# Patient Record
Sex: FEMALE | Race: WHITE | Employment: OTHER | URBAN - NONMETROPOLITAN AREA
[De-identification: names, ages, dates, MRNs, and addresses within clinical notes are randomized per-mention and may not be internally consistent; named-entity substitution may affect disease eponyms.]

---

## 2017-08-24 ENCOUNTER — OFFICE VISIT (OUTPATIENT)
Dept: PRIMARY CARE CLINIC | Age: 57
End: 2017-08-24
Payer: MEDICARE

## 2017-08-24 VITALS
WEIGHT: 109 LBS | OXYGEN SATURATION: 97 % | HEART RATE: 71 BPM | BODY MASS INDEX: 17.11 KG/M2 | HEIGHT: 67 IN | DIASTOLIC BLOOD PRESSURE: 76 MMHG | TEMPERATURE: 97.1 F | SYSTOLIC BLOOD PRESSURE: 110 MMHG

## 2017-08-24 DIAGNOSIS — Z79.899 MEDICATION MANAGEMENT: ICD-10-CM

## 2017-08-24 DIAGNOSIS — G43.909 MIGRAINE WITHOUT STATUS MIGRAINOSUS, NOT INTRACTABLE, UNSPECIFIED MIGRAINE TYPE: Primary | ICD-10-CM

## 2017-08-24 DIAGNOSIS — G89.29 CHRONIC BILATERAL LOW BACK PAIN, WITH SCIATICA PRESENCE UNSPECIFIED: ICD-10-CM

## 2017-08-24 DIAGNOSIS — F41.9 ANXIETY: ICD-10-CM

## 2017-08-24 DIAGNOSIS — M54.5 CHRONIC BILATERAL LOW BACK PAIN, WITH SCIATICA PRESENCE UNSPECIFIED: ICD-10-CM

## 2017-08-24 DIAGNOSIS — G47.00 INSOMNIA, UNSPECIFIED TYPE: ICD-10-CM

## 2017-08-24 DIAGNOSIS — J44.9 CHRONIC OBSTRUCTIVE PULMONARY DISEASE, UNSPECIFIED COPD TYPE (HCC): ICD-10-CM

## 2017-08-24 DIAGNOSIS — E03.9 HYPOTHYROIDISM, UNSPECIFIED TYPE: ICD-10-CM

## 2017-08-24 DIAGNOSIS — K59.09 CHRONIC CONSTIPATION: ICD-10-CM

## 2017-08-24 DIAGNOSIS — F32.A DEPRESSION, UNSPECIFIED DEPRESSION TYPE: ICD-10-CM

## 2017-08-24 PROBLEM — M54.50 CHRONIC BILATERAL LOW BACK PAIN: Status: ACTIVE | Noted: 2017-08-24

## 2017-08-24 LAB
AMPHETAMINE SCREEN, URINE: NORMAL
BARBITURATE SCREEN, URINE: NORMAL
BENZODIAZEPINE SCREEN, URINE: NORMAL
COCAINE METABOLITE SCREEN URINE: NORMAL
MDMA URINE: NORMAL
METHADONE SCREEN, URINE: NORMAL
METHAMPHETAMINE, URINE: NORMAL
OPIATE SCREEN URINE: NORMAL
OXYCODONE SCREEN URINE: NORMAL
PHENCYCLIDINE SCREEN URINE: NORMAL
PROPOXYPHENE SCREEN, URINE: NORMAL
THC: NORMAL
TRICYCLIC ANTIDEPRESSANTS, UR: NORMAL

## 2017-08-24 PROCEDURE — 3017F COLORECTAL CA SCREEN DOC REV: CPT | Performed by: NURSE PRACTITIONER

## 2017-08-24 PROCEDURE — G8419 CALC BMI OUT NRM PARAM NOF/U: HCPCS | Performed by: NURSE PRACTITIONER

## 2017-08-24 PROCEDURE — 4004F PT TOBACCO SCREEN RCVD TLK: CPT | Performed by: NURSE PRACTITIONER

## 2017-08-24 PROCEDURE — 3023F SPIROM DOC REV: CPT | Performed by: NURSE PRACTITIONER

## 2017-08-24 PROCEDURE — G8427 DOCREV CUR MEDS BY ELIG CLIN: HCPCS | Performed by: NURSE PRACTITIONER

## 2017-08-24 PROCEDURE — 80305 DRUG TEST PRSMV DIR OPT OBS: CPT | Performed by: NURSE PRACTITIONER

## 2017-08-24 PROCEDURE — 99204 OFFICE O/P NEW MOD 45 MIN: CPT | Performed by: NURSE PRACTITIONER

## 2017-08-24 PROCEDURE — G8926 SPIRO NO PERF OR DOC: HCPCS | Performed by: NURSE PRACTITIONER

## 2017-08-24 PROCEDURE — 3014F SCREEN MAMMO DOC REV: CPT | Performed by: NURSE PRACTITIONER

## 2017-08-24 RX ORDER — BUPROPION HYDROCHLORIDE 150 MG/1
150 TABLET ORAL EVERY MORNING
COMMUNITY
End: 2017-09-11 | Stop reason: SDUPTHER

## 2017-08-24 RX ORDER — PROMETHAZINE HCL 50 MG
50 TABLET ORAL EVERY 6 HOURS PRN
COMMUNITY
End: 2017-12-28

## 2017-08-24 RX ORDER — PROMETHAZINE HYDROCHLORIDE 25 MG/1
25 TABLET ORAL EVERY 6 HOURS PRN
COMMUNITY
End: 2017-12-28 | Stop reason: SDUPTHER

## 2017-08-24 RX ORDER — ALBUTEROL SULFATE 90 UG/1
2 AEROSOL, METERED RESPIRATORY (INHALATION) EVERY 6 HOURS PRN
COMMUNITY
End: 2017-09-11 | Stop reason: SDUPTHER

## 2017-08-24 RX ORDER — BUTALBITAL, ACETAMINOPHEN, CAFFEINE AND CODEINE PHOSPHATE 300; 50; 40; 30 MG/1; MG/1; MG/1; MG/1
1 CAPSULE ORAL EVERY 4 HOURS PRN
COMMUNITY
End: 2017-08-24

## 2017-08-24 RX ORDER — FLUOXETINE HYDROCHLORIDE 40 MG/1
40 CAPSULE ORAL DAILY
COMMUNITY
End: 2017-09-11 | Stop reason: SDUPTHER

## 2017-08-24 RX ORDER — TRAZODONE HYDROCHLORIDE 100 MG/1
100 TABLET ORAL NIGHTLY
COMMUNITY
End: 2017-09-11 | Stop reason: SDUPTHER

## 2017-08-24 RX ORDER — LEVOTHYROXINE SODIUM 0.05 MG/1
50 TABLET ORAL DAILY
COMMUNITY
End: 2017-09-11 | Stop reason: SDUPTHER

## 2017-08-24 RX ORDER — PANTOPRAZOLE SODIUM 40 MG/1
40 TABLET, DELAYED RELEASE ORAL 2 TIMES DAILY
COMMUNITY
End: 2017-09-11 | Stop reason: SDUPTHER

## 2017-08-24 RX ORDER — DIAZEPAM 10 MG/1
10 TABLET ORAL EVERY 12 HOURS PRN
Qty: 60 TABLET | Refills: 0 | Status: SHIPPED | OUTPATIENT
Start: 2017-08-24 | End: 2017-09-21

## 2017-08-24 RX ORDER — ONDANSETRON 8 MG/1
8 TABLET, ORALLY DISINTEGRATING ORAL EVERY 8 HOURS PRN
COMMUNITY
End: 2017-09-21

## 2017-08-24 RX ORDER — DIAZEPAM 10 MG/1
10 TABLET ORAL EVERY 12 HOURS PRN
COMMUNITY
End: 2017-08-24 | Stop reason: SDUPTHER

## 2017-08-24 RX ORDER — MONTELUKAST SODIUM 10 MG/1
10 TABLET ORAL NIGHTLY
COMMUNITY
End: 2017-09-11 | Stop reason: SDUPTHER

## 2017-08-24 RX ORDER — HYDROCODONE BITARTRATE AND ACETAMINOPHEN 7.5; 325 MG/1; MG/1
1 TABLET ORAL EVERY 8 HOURS PRN
Qty: 90 TABLET | Refills: 0 | Status: SHIPPED | OUTPATIENT
Start: 2017-08-24 | End: 2017-09-21

## 2017-08-24 RX ORDER — BENZONATATE 100 MG/1
100 CAPSULE ORAL 3 TIMES DAILY PRN
COMMUNITY
End: 2017-11-22

## 2017-08-24 RX ORDER — CYCLOSPORINE 0.5 MG/ML
1 EMULSION OPHTHALMIC 2 TIMES DAILY
COMMUNITY
End: 2018-09-06 | Stop reason: SDUPTHER

## 2017-08-24 ASSESSMENT — ENCOUNTER SYMPTOMS
TROUBLE SWALLOWING: 0
DIARRHEA: 0
ABDOMINAL PAIN: 0
CONSTIPATION: 0
NAUSEA: 1
EYE REDNESS: 0
BLOOD IN STOOL: 0
BACK PAIN: 1
COUGH: 0
EYE DISCHARGE: 0
RHINORRHEA: 0
SORE THROAT: 0
WHEEZING: 0

## 2017-09-06 ENCOUNTER — TELEPHONE (OUTPATIENT)
Dept: PRIMARY CARE CLINIC | Age: 57
End: 2017-09-06

## 2017-09-06 DIAGNOSIS — G89.29 CHRONIC BILATERAL LOW BACK PAIN, WITH SCIATICA PRESENCE UNSPECIFIED: Primary | ICD-10-CM

## 2017-09-06 DIAGNOSIS — M54.5 CHRONIC BILATERAL LOW BACK PAIN, WITH SCIATICA PRESENCE UNSPECIFIED: Primary | ICD-10-CM

## 2017-09-07 ENCOUNTER — TELEPHONE (OUTPATIENT)
Dept: PRIMARY CARE CLINIC | Age: 57
End: 2017-09-07

## 2017-09-11 DIAGNOSIS — K59.09 CHRONIC CONSTIPATION: ICD-10-CM

## 2017-09-11 DIAGNOSIS — G47.00 INSOMNIA, UNSPECIFIED TYPE: ICD-10-CM

## 2017-09-11 DIAGNOSIS — F32.A DEPRESSION, UNSPECIFIED DEPRESSION TYPE: ICD-10-CM

## 2017-09-11 DIAGNOSIS — F41.9 ANXIETY: ICD-10-CM

## 2017-09-11 DIAGNOSIS — E03.9 HYPOTHYROIDISM, UNSPECIFIED TYPE: ICD-10-CM

## 2017-09-11 DIAGNOSIS — J44.9 CHRONIC OBSTRUCTIVE PULMONARY DISEASE, UNSPECIFIED COPD TYPE (HCC): ICD-10-CM

## 2017-09-11 RX ORDER — MONTELUKAST SODIUM 10 MG/1
10 TABLET ORAL NIGHTLY
Qty: 90 TABLET | Refills: 1 | Status: SHIPPED | OUTPATIENT
Start: 2017-09-11 | End: 2017-12-28 | Stop reason: SDUPTHER

## 2017-09-11 RX ORDER — ALBUTEROL SULFATE 90 UG/1
2 AEROSOL, METERED RESPIRATORY (INHALATION) EVERY 6 HOURS PRN
Qty: 3 INHALER | Refills: 1 | Status: SHIPPED | OUTPATIENT
Start: 2017-09-11 | End: 2018-03-01 | Stop reason: SDUPTHER

## 2017-09-11 RX ORDER — FLUOXETINE HYDROCHLORIDE 40 MG/1
40 CAPSULE ORAL DAILY
Qty: 90 CAPSULE | Refills: 1 | Status: SHIPPED | OUTPATIENT
Start: 2017-09-11 | End: 2017-11-07 | Stop reason: SDUPTHER

## 2017-09-11 RX ORDER — PANTOPRAZOLE SODIUM 40 MG/1
40 TABLET, DELAYED RELEASE ORAL 2 TIMES DAILY
Qty: 180 TABLET | Refills: 1 | Status: SHIPPED | OUTPATIENT
Start: 2017-09-11 | End: 2018-03-01 | Stop reason: SDUPTHER

## 2017-09-11 RX ORDER — BUPROPION HYDROCHLORIDE 150 MG/1
150 TABLET ORAL EVERY MORNING
Qty: 90 TABLET | Refills: 1 | Status: SHIPPED | OUTPATIENT
Start: 2017-09-11 | End: 2017-10-31 | Stop reason: SDUPTHER

## 2017-09-11 RX ORDER — LEVOTHYROXINE SODIUM 0.05 MG/1
50 TABLET ORAL DAILY
Qty: 90 TABLET | Refills: 1 | Status: SHIPPED | OUTPATIENT
Start: 2017-09-11 | End: 2018-03-01 | Stop reason: SDUPTHER

## 2017-09-11 RX ORDER — TRAZODONE HYDROCHLORIDE 100 MG/1
100 TABLET ORAL NIGHTLY
Qty: 90 TABLET | Refills: 1 | Status: SHIPPED | OUTPATIENT
Start: 2017-09-11 | End: 2017-09-11 | Stop reason: SDUPTHER

## 2017-09-12 ENCOUNTER — TELEPHONE (OUTPATIENT)
Dept: PRIMARY CARE CLINIC | Age: 57
End: 2017-09-12

## 2017-09-12 RX ORDER — TRAZODONE HYDROCHLORIDE 100 MG/1
TABLET ORAL
Qty: 180 TABLET | Refills: 1 | Status: SHIPPED | OUTPATIENT
Start: 2017-09-12 | End: 2017-11-22 | Stop reason: SDUPTHER

## 2017-09-13 ENCOUNTER — TELEPHONE (OUTPATIENT)
Dept: PRIMARY CARE CLINIC | Age: 57
End: 2017-09-13

## 2017-09-20 ENCOUNTER — HOSPITAL ENCOUNTER (OUTPATIENT)
Dept: CT IMAGING | Age: 57
Discharge: HOME | End: 2017-09-20
Payer: MEDICARE

## 2017-09-20 DIAGNOSIS — M54.5 CHRONIC BILATERAL LOW BACK PAIN, WITH SCIATICA PRESENCE UNSPECIFIED: ICD-10-CM

## 2017-09-20 DIAGNOSIS — G89.29 CHRONIC BILATERAL LOW BACK PAIN, WITH SCIATICA PRESENCE UNSPECIFIED: ICD-10-CM

## 2017-09-20 PROCEDURE — 72131 CT LUMBAR SPINE W/O DYE: CPT

## 2017-09-21 ENCOUNTER — TELEPHONE (OUTPATIENT)
Dept: PRIMARY CARE CLINIC | Age: 57
End: 2017-09-21

## 2017-09-21 ENCOUNTER — OFFICE VISIT (OUTPATIENT)
Dept: PRIMARY CARE CLINIC | Age: 57
End: 2017-09-21
Payer: MEDICARE

## 2017-09-21 VITALS
SYSTOLIC BLOOD PRESSURE: 96 MMHG | HEIGHT: 67 IN | OXYGEN SATURATION: 98 % | TEMPERATURE: 98.7 F | HEART RATE: 87 BPM | DIASTOLIC BLOOD PRESSURE: 74 MMHG | WEIGHT: 107.5 LBS | BODY MASS INDEX: 16.87 KG/M2

## 2017-09-21 DIAGNOSIS — R21 RASH: ICD-10-CM

## 2017-09-21 DIAGNOSIS — R56.9 SEIZURES (HCC): ICD-10-CM

## 2017-09-21 DIAGNOSIS — F32.A DEPRESSION, UNSPECIFIED DEPRESSION TYPE: ICD-10-CM

## 2017-09-21 DIAGNOSIS — Z12.31 SCREENING MAMMOGRAM, ENCOUNTER FOR: ICD-10-CM

## 2017-09-21 DIAGNOSIS — K59.09 CHRONIC CONSTIPATION: ICD-10-CM

## 2017-09-21 DIAGNOSIS — F41.1 GAD (GENERALIZED ANXIETY DISORDER): ICD-10-CM

## 2017-09-21 DIAGNOSIS — Z11.59 NEED FOR HEPATITIS C SCREENING TEST: ICD-10-CM

## 2017-09-21 DIAGNOSIS — M54.5 CHRONIC BILATERAL LOW BACK PAIN, WITH SCIATICA PRESENCE UNSPECIFIED: Primary | ICD-10-CM

## 2017-09-21 DIAGNOSIS — N89.8 VAGINAL DISCHARGE: ICD-10-CM

## 2017-09-21 DIAGNOSIS — M54.16 LUMBAR NERVE ROOT IMPINGEMENT: ICD-10-CM

## 2017-09-21 DIAGNOSIS — Z79.899 MEDICATION MANAGEMENT: ICD-10-CM

## 2017-09-21 DIAGNOSIS — G89.29 CHRONIC BILATERAL LOW BACK PAIN, WITH SCIATICA PRESENCE UNSPECIFIED: Primary | ICD-10-CM

## 2017-09-21 DIAGNOSIS — G43.909 MIGRAINE WITHOUT STATUS MIGRAINOSUS, NOT INTRACTABLE, UNSPECIFIED MIGRAINE TYPE: ICD-10-CM

## 2017-09-21 DIAGNOSIS — E03.9 HYPOTHYROIDISM, UNSPECIFIED TYPE: ICD-10-CM

## 2017-09-21 DIAGNOSIS — Z80.0 FAMILY HISTORY OF COLON CANCER: ICD-10-CM

## 2017-09-21 DIAGNOSIS — Z11.4 SCREENING FOR HIV (HUMAN IMMUNODEFICIENCY VIRUS): ICD-10-CM

## 2017-09-21 DIAGNOSIS — K31.84 GASTROPARESIS: ICD-10-CM

## 2017-09-21 LAB
AMPHETAMINE SCREEN, URINE: NEGATIVE
BARBITURATE SCREEN, URINE: NEGATIVE
BENZODIAZEPINE SCREEN, URINE: POSITIVE
COCAINE METABOLITE SCREEN URINE: NEGATIVE
MDMA URINE: NEGATIVE
METHADONE SCREEN, URINE: NEGATIVE
METHAMPHETAMINE, URINE: NEGATIVE
OPIATE SCREEN URINE: POSITIVE
OXYCODONE SCREEN URINE: NEGATIVE
PHENCYCLIDINE SCREEN URINE: NEGATIVE
PROPOXYPHENE SCREEN, URINE: NEGATIVE
THC: NEGATIVE
TRICYCLIC ANTIDEPRESSANTS, UR: NEGATIVE

## 2017-09-21 PROCEDURE — 99214 OFFICE O/P EST MOD 30 MIN: CPT | Performed by: NURSE PRACTITIONER

## 2017-09-21 PROCEDURE — 4004F PT TOBACCO SCREEN RCVD TLK: CPT | Performed by: NURSE PRACTITIONER

## 2017-09-21 PROCEDURE — G8419 CALC BMI OUT NRM PARAM NOF/U: HCPCS | Performed by: NURSE PRACTITIONER

## 2017-09-21 PROCEDURE — 3017F COLORECTAL CA SCREEN DOC REV: CPT | Performed by: NURSE PRACTITIONER

## 2017-09-21 PROCEDURE — 3014F SCREEN MAMMO DOC REV: CPT | Performed by: NURSE PRACTITIONER

## 2017-09-21 PROCEDURE — G8427 DOCREV CUR MEDS BY ELIG CLIN: HCPCS | Performed by: NURSE PRACTITIONER

## 2017-09-21 PROCEDURE — 80305 DRUG TEST PRSMV DIR OPT OBS: CPT | Performed by: NURSE PRACTITIONER

## 2017-09-21 RX ORDER — CLOBETASOL PROPIONATE 0.5 MG/G
CREAM TOPICAL 2 TIMES DAILY
Qty: 60 G | Refills: 1 | Status: SHIPPED | OUTPATIENT
Start: 2017-09-21 | End: 2018-02-12

## 2017-09-21 RX ORDER — BUTALBITAL, ACETAMINOPHEN AND CAFFEINE 50; 325; 40 MG/1; MG/1; MG/1
1 TABLET ORAL EVERY 8 HOURS PRN
Qty: 30 TABLET | Refills: 0 | Status: SHIPPED | OUTPATIENT
Start: 2017-09-21 | End: 2017-12-28

## 2017-09-21 RX ORDER — LORAZEPAM 1 MG/1
1 TABLET ORAL EVERY 8 HOURS PRN
Qty: 90 TABLET | Refills: 0 | Status: SHIPPED | OUTPATIENT
Start: 2017-09-21 | End: 2017-10-23

## 2017-09-21 RX ORDER — CLOBETASOL PROPIONATE 0.5 MG/G
CREAM TOPICAL 2 TIMES DAILY
COMMUNITY
End: 2017-09-21 | Stop reason: SDUPTHER

## 2017-09-21 RX ORDER — HYDROCODONE BITARTRATE AND ACETAMINOPHEN 10; 325 MG/1; MG/1
1 TABLET ORAL EVERY 8 HOURS PRN
Qty: 25 TABLET | Refills: 0 | Status: SHIPPED | OUTPATIENT
Start: 2017-09-21 | End: 2017-09-29 | Stop reason: SDUPTHER

## 2017-09-21 RX ORDER — SUMATRIPTAN 50 MG/1
50 TABLET, FILM COATED ORAL
Qty: 9 TABLET | Refills: 3 | Status: SHIPPED | OUTPATIENT
Start: 2017-09-21 | End: 2017-10-23

## 2017-09-21 ASSESSMENT — ENCOUNTER SYMPTOMS
BLOOD IN STOOL: 0
ABDOMINAL PAIN: 0
WHEEZING: 0
NAUSEA: 1
SORE THROAT: 0
TROUBLE SWALLOWING: 0
COUGH: 0
CONSTIPATION: 0
EYE DISCHARGE: 0
DIARRHEA: 0
RHINORRHEA: 0
BACK PAIN: 1
EYE REDNESS: 0

## 2017-09-21 NOTE — TELEPHONE ENCOUNTER
----- Message from PARVEEN Pettit sent at 9/20/2017  7:02 PM CDT -----  Please notify patient of the following  CT scan of lumbar spine:  No evidence of acute bony injury. There is multilevel degenerative disc disease. There is severe left neuroforaminal narrowing at L4 and 5 and L5-S1. There is mass effect upon the exiting nerve roots. There is also mild spinal canal stenosis at L4-L5. Recommend referral to neurosurgeon of choice.   Keep referral to pain mgt as well

## 2017-09-28 ENCOUNTER — TELEPHONE (OUTPATIENT)
Dept: PRIMARY CARE CLINIC | Age: 57
End: 2017-09-28

## 2017-09-29 DIAGNOSIS — G89.29 CHRONIC BILATERAL LOW BACK PAIN, WITH SCIATICA PRESENCE UNSPECIFIED: ICD-10-CM

## 2017-09-29 DIAGNOSIS — M54.16 LUMBAR NERVE ROOT IMPINGEMENT: ICD-10-CM

## 2017-09-29 DIAGNOSIS — M54.5 CHRONIC BILATERAL LOW BACK PAIN, WITH SCIATICA PRESENCE UNSPECIFIED: ICD-10-CM

## 2017-09-29 RX ORDER — HYDROCODONE BITARTRATE AND ACETAMINOPHEN 10; 325 MG/1; MG/1
1 TABLET ORAL EVERY 8 HOURS PRN
Qty: 12 TABLET | Refills: 0 | Status: SHIPPED | OUTPATIENT
Start: 2017-09-29 | End: 2017-10-03

## 2017-10-02 ENCOUNTER — TELEPHONE (OUTPATIENT)
Dept: PRIMARY CARE CLINIC | Age: 57
End: 2017-10-02

## 2017-10-02 RX ORDER — LORATADINE 10 MG/1
10 TABLET ORAL DAILY
Qty: 30 TABLET | Refills: 3 | Status: SHIPPED | OUTPATIENT
Start: 2017-10-02 | End: 2018-02-12

## 2017-10-02 RX ORDER — RIZATRIPTAN BENZOATE 5 MG/1
5 TABLET ORAL
Qty: 30 TABLET | Refills: 3 | Status: SHIPPED | OUTPATIENT
Start: 2017-10-02 | End: 2017-11-22 | Stop reason: SDUPTHER

## 2017-10-02 NOTE — TELEPHONE ENCOUNTER
Pt states has migraines ALL day, from the time she wakes up til she goes to bed. States already out of Imitrex and butalbital didn't help at all. Wants diff med please. And something for hay fever.

## 2017-10-03 ENCOUNTER — HOSPITAL ENCOUNTER (EMERGENCY)
Age: 57
Discharge: HOME | End: 2017-10-03
Attending: EMERGENCY MEDICINE
Payer: MEDICARE

## 2017-10-03 ENCOUNTER — APPOINTMENT (OUTPATIENT)
Dept: CT IMAGING | Age: 57
End: 2017-10-03
Payer: MEDICARE

## 2017-10-03 VITALS
OXYGEN SATURATION: 98 % | WEIGHT: 107 LBS | RESPIRATION RATE: 16 BRPM | HEIGHT: 67 IN | BODY MASS INDEX: 16.79 KG/M2 | TEMPERATURE: 97.6 F | SYSTOLIC BLOOD PRESSURE: 127 MMHG | DIASTOLIC BLOOD PRESSURE: 82 MMHG | HEART RATE: 71 BPM

## 2017-10-03 DIAGNOSIS — R56.9 SEIZURE (HCC): ICD-10-CM

## 2017-10-03 DIAGNOSIS — G43.909 MIGRAINE WITHOUT STATUS MIGRAINOSUS, NOT INTRACTABLE, UNSPECIFIED MIGRAINE TYPE: Primary | ICD-10-CM

## 2017-10-03 LAB
ALBUMIN SERPL-MCNC: 3.7 G/DL (ref 3.5–5.2)
ALP BLD-CCNC: 50 U/L (ref 35–104)
ALT SERPL-CCNC: 13 U/L (ref 5–33)
ANION GAP SERPL CALCULATED.3IONS-SCNC: 12 MMOL/L (ref 7–19)
AST SERPL-CCNC: 15 U/L (ref 5–32)
BILIRUB SERPL-MCNC: <0.2 MG/DL (ref 0.2–1.2)
BILIRUBIN URINE: NEGATIVE
BLOOD, URINE: NEGATIVE
BUN BLDV-MCNC: 8 MG/DL (ref 6–20)
CALCIUM SERPL-MCNC: 8.2 MG/DL (ref 8.6–10)
CHLORIDE BLD-SCNC: 100 MMOL/L (ref 98–111)
CLARITY: CLEAR
CO2: 21 MMOL/L (ref 22–29)
COLOR: YELLOW
CREAT SERPL-MCNC: 0.7 MG/DL (ref 0.5–0.9)
GFR NON-AFRICAN AMERICAN: >60
GLUCOSE BLD-MCNC: 98 MG/DL (ref 74–109)
GLUCOSE URINE: NEGATIVE MG/DL
HCT VFR BLD CALC: 28.9 % (ref 37–47)
HEMOGLOBIN: 9.9 G/DL (ref 12–16)
KETONES, URINE: NEGATIVE MG/DL
LEUKOCYTE ESTERASE, URINE: NEGATIVE
MCH RBC QN AUTO: 33.4 PG (ref 27–31)
MCHC RBC AUTO-ENTMCNC: 34.3 G/DL (ref 33–37)
MCV RBC AUTO: 97.6 FL (ref 81–99)
NITRITE, URINE: NEGATIVE
PDW BLD-RTO: 12 % (ref 11.5–14.5)
PH UA: 5.5
PLATELET # BLD: 256 K/UL (ref 130–400)
PMV BLD AUTO: 9 FL (ref 9.4–12.3)
POTASSIUM SERPL-SCNC: 3.4 MMOL/L (ref 3.5–5)
PROTEIN UA: NEGATIVE MG/DL
RBC # BLD: 2.96 M/UL (ref 4.2–5.4)
SODIUM BLD-SCNC: 133 MMOL/L (ref 136–145)
SPECIFIC GRAVITY UA: 1
TOTAL PROTEIN: 5.8 G/DL (ref 6.6–8.7)
UROBILINOGEN, URINE: 0.2 E.U./DL
WBC # BLD: 3.2 K/UL (ref 4.8–10.8)

## 2017-10-03 PROCEDURE — 6360000002 HC RX W HCPCS: Performed by: NURSE PRACTITIONER

## 2017-10-03 PROCEDURE — 2580000003 HC RX 258: Performed by: NURSE PRACTITIONER

## 2017-10-03 PROCEDURE — 96374 THER/PROPH/DIAG INJ IV PUSH: CPT

## 2017-10-03 PROCEDURE — 96375 TX/PRO/DX INJ NEW DRUG ADDON: CPT

## 2017-10-03 PROCEDURE — 81003 URINALYSIS AUTO W/O SCOPE: CPT

## 2017-10-03 PROCEDURE — 99284 EMERGENCY DEPT VISIT MOD MDM: CPT

## 2017-10-03 PROCEDURE — 80053 COMPREHEN METABOLIC PANEL: CPT

## 2017-10-03 PROCEDURE — 70450 CT HEAD/BRAIN W/O DYE: CPT

## 2017-10-03 PROCEDURE — 36415 COLL VENOUS BLD VENIPUNCTURE: CPT

## 2017-10-03 PROCEDURE — 99283 EMERGENCY DEPT VISIT LOW MDM: CPT | Performed by: EMERGENCY MEDICINE

## 2017-10-03 PROCEDURE — 85027 COMPLETE CBC AUTOMATED: CPT

## 2017-10-03 RX ORDER — 0.9 % SODIUM CHLORIDE 0.9 %
1000 INTRAVENOUS SOLUTION INTRAVENOUS ONCE
Status: COMPLETED | OUTPATIENT
Start: 2017-10-03 | End: 2017-10-03

## 2017-10-03 RX ORDER — PROMETHAZINE HYDROCHLORIDE 25 MG/ML
12.5 INJECTION, SOLUTION INTRAMUSCULAR; INTRAVENOUS ONCE
Status: COMPLETED | OUTPATIENT
Start: 2017-10-03 | End: 2017-10-03

## 2017-10-03 RX ADMIN — SODIUM CHLORIDE 1000 ML: 9 INJECTION, SOLUTION INTRAVENOUS at 07:45

## 2017-10-03 RX ADMIN — PROMETHAZINE HYDROCHLORIDE 12.5 MG: 25 INJECTION INTRAMUSCULAR; INTRAVENOUS at 07:44

## 2017-10-03 RX ADMIN — HYDROMORPHONE HYDROCHLORIDE 1 MG: 1 INJECTION, SOLUTION INTRAMUSCULAR; INTRAVENOUS; SUBCUTANEOUS at 07:45

## 2017-10-03 ASSESSMENT — PAIN SCALES - GENERAL
PAINLEVEL_OUTOF10: 10
PAINLEVEL_OUTOF10: 9

## 2017-10-03 ASSESSMENT — PAIN DESCRIPTION - PAIN TYPE: TYPE: ACUTE PAIN

## 2017-10-03 ASSESSMENT — ENCOUNTER SYMPTOMS
RESPIRATORY NEGATIVE: 1
BACK PAIN: 1
NAUSEA: 1
VOMITING: 1

## 2017-10-03 ASSESSMENT — PAIN DESCRIPTION - LOCATION
LOCATION: BACK
LOCATION: HEAD;BACK

## 2017-10-03 NOTE — ED PROVIDER NOTES
neuro as planned, pt voiced agreement with plan      FINAL IMPRESSION      1. Migraine without status migrainosus, not intractable, unspecified migraine type    2.  Seizure ([facility].)          Mayi Bolivar MD  Attending Emergency Physician       Loi Archer MD  10/03/17 [id number]

## 2017-10-03 NOTE — ED PROVIDER NOTES
performed and is negative except as noted above in the HPI. PAST MEDICAL HISTORY     Past Medical History:   Diagnosis Date    Allergic rhinitis     Anxiety     Asthma     B12 deficiency     Depression     Gastroparesis     Headache     Hypothyroid     Seizures (HCC)          SURGICAL HISTORY       Past Surgical History:   Procedure Laterality Date    CHOLECYSTECTOMY      COLON SURGERY      GASTRIC STIMULATOR IMPLANT SURGERY      HERNIA REPAIR      TUBAL LIGATION           CURRENT MEDICATIONS       Discharge Medication List as of 10/3/2017  9:27 AM      CONTINUE these medications which have NOT CHANGED    Details   loratadine (CLARITIN) 10 MG tablet Take 1 tablet by mouth daily, Disp-30 tablet, R-3Normal      rizatriptan (MAXALT) 5 MG tablet Take 1 tablet by mouth once as needed for Migraine May repeat in 2 hours if needed, Disp-30 tablet, R-3Normal      HYDROcodone-acetaminophen (NORCO)  MG per tablet Take 1 tablet by mouth every 8 hours as needed for Pain . Earliest Fill Date: 9/29/17, Disp-12 tablet, R-0Print      clobetasol (TEMOVATE) 0.05 % cream Apply topically 2 times daily Apply topically 2 times daily. , Topical, 2 TIMES DAILY Starting 9/21/2017, Until Discontinued, Disp-60 g, R-1, Normal      butalbital-acetaminophen-caffeine (FIORICET, ESGIC) -40 MG per tablet Take 1 tablet by mouth every 8 hours as needed for Migraine, Disp-30 tablet, R-0Print      LORazepam (ATIVAN) 1 MG tablet Take 1 tablet by mouth every 8 hours as needed for Anxiety, Disp-90 tablet, R-0Print      topiramate (TOPAMAX) 200 MG tablet Indications: TAKE 2 TABLETS BY MOUTH AT BEDTIME Take 2 tablets by mouth at bedtime, Disp-180 tablet, R-1Normal      traZODone (DESYREL) 100 MG tablet Indications: TAKE 2 TABLETS BY MOUTH NIGHTLY AT BEDTIME Take 2 tablets by mouth at bedtime, Disp-180 tablet, R-1Normal      buPROPion (WELLBUTRIN XL) 150 MG extended release tablet Take 1 tablet by mouth every morning, Disp-90 tablet, R-1Normal      FLUoxetine (PROZAC) 40 MG capsule Take 1 capsule by mouth daily, Disp-90 capsule, R-1Normal      levothyroxine (SYNTHROID) 50 MCG tablet Take 1 tablet by mouth Daily, Disp-90 tablet, R-1Normal      linaclotide (LINZESS) 290 MCG CAPS capsule Take 1 capsule by mouth every morning (before breakfast), Disp-90 capsule, R-1Normal      montelukast (SINGULAIR) 10 MG tablet Take 1 tablet by mouth nightly, Disp-90 tablet, R-1Normal      pantoprazole (PROTONIX) 40 MG tablet Take 1 tablet by mouth 2 times daily, Disp-180 tablet, R-1Normal      tiotropium (SPIRIVA) 18 MCG inhalation capsule Inhale 1 capsule into the lungs daily, Disp-90 capsule, R-1Normal      albuterol sulfate  (90 Base) MCG/ACT inhaler Inhale 2 puffs into the lungs every 6 hours as needed for Wheezing, Disp-3 Inhaler, R-1Normal      cycloSPORINE (RESTASIS) 0.05 % ophthalmic emulsion Place 1 drop into both eyes 2 times dailyHistorical Med      benzonatate (TESSALON) 100 MG capsule Take 100 mg by mouth 3 times daily as needed for CoughHistorical Med      !! promethazine (PHENERGAN) 25 MG tablet Take 25 mg by mouth every 6 hours as needed for NauseaHistorical Med      !! promethazine (PHENERGAN) 50 MG tablet Take 50 mg by mouth every 6 hours as needed for NauseaHistorical Med      SUMAtriptan (IMITREX) 50 MG tablet Take 1 tablet by mouth once as needed for Migraine X 1, Disp-9 tablet, R-3Normal       !! - Potential duplicate medications found. Please discuss with provider. ALLERGIES     Codeine; Pcn [penicillins]; Percocet [oxycodone-acetaminophen]; Prednisone; and Sulfa antibiotics    FAMILY HISTORY     History reviewed. No pertinent family history.        SOCIAL HISTORY       Social History     Social History    Marital status:      Spouse name: N/A    Number of children: N/A    Years of education: N/A     Social History Main Topics    Smoking status: Current Every Day Smoker     Types: Cigarettes    Smokeless tobacco: None    Alcohol use No    Drug use: None    Sexual activity: Not Asked     Other Topics Concern    None     Social History Narrative       SCREENINGS             PHYSICAL EXAM    (up to 7 for level 4, 8 or more for level 5)   ED Triage Vitals   BP Temp Temp src Pulse Resp SpO2 Height Weight   -- -- -- -- -- -- -- --               Vitals:    10/03/17 0720 10/03/17 0801 10/03/17 0831 10/03/17 0832   BP: 108/79 120/77 127/82 127/82   Pulse: 71   71   Resp: 14   16   Temp: 97.6 °F (36.4 °C)      TempSrc: Oral      SpO2: 98% 96%  98%   Weight: 107 lb (48.5 kg)      Height: 5' 7\" (1.702 m)            Physical Exam   Constitutional: She is oriented to person, place, and time. She appears well-nourished. HENT:   Head: Normocephalic. Right Ear: External ear normal.   Left Ear: External ear normal.   Mouth/Throat: Oropharynx is clear and moist.   No definite abrasion to left lateral tongue   Eyes: Conjunctivae and EOM are normal. Pupils are equal, round, and reactive to light. Neck: Normal range of motion. Cardiovascular: Normal rate, regular rhythm, normal heart sounds and intact distal pulses. Pulmonary/Chest: Effort normal and breath sounds normal.   Abdominal: Soft. Bowel sounds are normal.   Musculoskeletal: Normal range of motion. scoliosis   Neurological: She is alert and oriented to person, place, and time. Normal finger to nose test  No pronator drift  Normal romberg test  4/5MS bilateral upper/lower extremities   Skin: Skin is warm and dry. Vitals reviewed.       DIAGNOSTIC RESULTS     EKG: All EKG's are interpreted by the Emergency Department Physician who either signs or Co-signs this chart in the absence of a cardiologist.        RADIOLOGY:   Non-plain film images such as CT, Ultrasound and MRI are read by the radiologist. Plain radiographic images are visualized and preliminarily interpreted by the emergency physician with the below findings:        Interpretation per the Radiologist below, if available at the time of this note:    CT Head WO Contrast   Final Result   Impression: No acute abnormality, normal unenhanced CT of the head. Signed by Dr Esdras Lopez. Kendra on 10/3/2017 8:19 AM            ED BEDSIDE ULTRASOUND:   Performed by ED Physician - none    LABS:  Labs Reviewed   CBC - Abnormal; Notable for the following:        Result Value    WBC 3.2 (*)     RBC 2.96 (*)     Hemoglobin 9.9 (*)     Hematocrit 28.9 (*)     MCH 33.4 (*)     MPV 9.0 (*)     All other components within normal limits   COMPREHENSIVE METABOLIC PANEL - Abnormal; Notable for the following:     Sodium 133 (*)     Potassium 3.4 (*)     CO2 21 (*)     Calcium 8.2 (*)     Total Protein 5.8 (*)     All other components within normal limits   URINALYSIS       All other labs were within normal range or not returned as of this dictation. RE-ASSESSMENT           EMERGENCY DEPARTMENT COURSE and DIFFERENTIAL DIAGNOSIS/MDM:   Vitals:    Vitals:    10/03/17 0720 10/03/17 0801 10/03/17 0831 10/03/17 0832   BP: 108/79 120/77 127/82 127/82   Pulse: 71   71   Resp: 14   16   Temp: 97.6 °F (36.4 °C)      TempSrc: Oral      SpO2: 98% 96%  98%   Weight: 107 lb (48.5 kg)      Height: 5' 7\" (1.702 m)          MDM      CONSULTS:  None    PROCEDURES:  Unless otherwise noted below, none     Procedures    FINAL IMPRESSION      1. Migraine without status migrainosus, not intractable, unspecified migraine type    2.  Seizure [facility])          DISPOSITION/PLAN   DISPOSITION     PATIENT REFERRED TO:  Leigh [place], [id number]  [phone]    Schedule an appointment as soon as possible for a visit in 2 days      Ubaldo Garcia MD  [address] [facility] [address] [id number]  [phone]    Schedule an appointment as soon as possible for a visit in 1 week        DISCHARGE MEDICATIONS:       Discharge Medication List as of 10/3/2017  9:27 AM          (Please note that portions of this note were completed with a

## 2017-10-03 NOTE — ED NOTES
ASSESSMENT:    PT ALERT/ORIENTED X4. PUPILS EQUAL/REACTIVE. NO SEIZURE ACTIVITY NOTED. SKIN:  WARM/DRY PINK CAPILLARY REFILL < 2SECS    CARDIAC:  S1 S2 NOTED     LUNGS: CLEAR UPPER AND LOWER LOBES, RESPIRATIONS EVEN/UNLABORED     ABDOMEN: BOWEL SOUNDS NOTED UPPER AND LOWER QUADRANTS                     SOFT AND NONTENDER. EXTREMITIES:  BILATERAL DP AND PT AND NO EDEMA NOTED. NO DISTRESS NOTED. SIDE RAILS UP AND CALL LIGHT IN REACH.        Jasmeet Lowery RN  10/03/17 [id number]

## 2017-10-03 NOTE — ED NOTES
Bed: 05  Expected date: 10/3/17  Expected time: 7:13 AM  Means of arrival:   Comments:  Shadi Tovar RN  10/03/17 [id number]

## 2017-10-03 NOTE — ED AVS SNAPSHOT
After Visit Summary  (Discharge Instructions)    Medication List for Home    Based on the information you provided to us as well as any changes during this visit, the following is your updated medication list.  Compare this with your prescription bottles at home. If you have any questions or concerns, contact your primary care physician's office. Daily Medication List (This medication list can be shared with any Healthcare provider who is helping you manage your medications)      ASK your doctor about these medications if you have questions     albuterol sulfate  (90 Base) MCG/ACT inhaler   Inhale 2 puffs into the lungs every 6 hours as needed for Wheezing       benzonatate 100 MG capsule   Commonly known as:  TESSALON   Take 100 mg by mouth 3 times daily as needed for Cough       buPROPion 150 MG extended release tablet   Commonly known as:  WELLBUTRIN XL   Take 1 tablet by mouth every morning       butalbital-acetaminophen-caffeine -40 MG per tablet   Commonly known as:  FIORICET, ESGIC   Take 1 tablet by mouth every 8 hours as needed for Migraine       clobetasol 0.05 % cream   Commonly known as:  TEMOVATE   Apply topically 2 times daily Apply topically 2 times daily. cycloSPORINE 0.05 % ophthalmic emulsion   Commonly known as:  RESTASIS   Place 1 drop into both eyes 2 times daily       FLUoxetine 40 MG capsule   Commonly known as:  PROZAC   Take 1 capsule by mouth daily       HYDROcodone-acetaminophen  MG per tablet   Commonly known as:  NORCO   Take 1 tablet by mouth every 8 hours as needed for Pain .  Earliest Fill Date: 9/29/17       levothyroxine 50 MCG tablet   Commonly known as:  SYNTHROID   Take 1 tablet by mouth Daily       linaclotide 290 MCG Caps capsule   Commonly known as:  LINZESS   Take 1 capsule by mouth every morning (before breakfast)       loratadine 10 MG tablet   Commonly known as:  CLARITIN   Take 1 tablet by mouth daily LORazepam 1 MG tablet   Commonly known as:  ATIVAN   Take 1 tablet by mouth every 8 hours as needed for Anxiety       montelukast 10 MG tablet   Commonly known as:  SINGULAIR   Take 1 tablet by mouth nightly       pantoprazole 40 MG tablet   Commonly known as:  PROTONIX   Take 1 tablet by mouth 2 times daily       * promethazine 25 MG tablet   Commonly known as:  PHENERGAN   Take 25 mg by mouth every 6 hours as needed for Nausea       * promethazine 50 MG tablet   Commonly known as:  PHENERGAN   Take 50 mg by mouth every 6 hours as needed for Nausea       rizatriptan 5 MG tablet   Commonly known as:  MAXALT   Take 1 tablet by mouth once as needed for Migraine May repeat in 2 hours if needed       SUMAtriptan 50 MG tablet   Commonly known as:  IMITREX   Take 1 tablet by mouth once as needed for Migraine X 1       tiotropium 18 MCG inhalation capsule   Commonly known as:  SPIRIVA   Inhale 1 capsule into the lungs daily       topiramate 200 MG tablet   Commonly known as:  TOPAMAX   Indications: TAKE 2 TABLETS BY MOUTH AT BEDTIME Take 2 tablets by mouth at bedtime       traZODone 100 MG tablet   Commonly known as:  DESYREL   Indications: TAKE 2 TABLETS BY MOUTH NIGHTLY AT BEDTIME Take 2 tablets by mouth at bedtime       * Notice: This list has 2 medication(s) that are the same as other medications prescribed for you. Read the directions carefully, and ask your doctor or other care provider to review them with you. Allergies as of 10/3/2017        Reactions    Codeine     Pcn [Penicillins]     Does not know reaction    Percocet [Oxycodone-acetaminophen]     Does agree with her system    Prednisone Other (See Comments)    Hyper, headache, anger    Sulfa Antibiotics Other (See Comments)    Headache      Immunizations as of 10/3/2017     No immunizations on file. After Visit Summary    This summary was created for you. Thank you for entrusting your care to us.   The following information includes details about your hospital/visit stay along with steps you should take to help with your recovery once you leave the hospital.  In this packet, you will find information about the topics listed below:    · Instructions about your medications including a list of your home medications  · A summary of your hospital visit  · Follow-up appointments once you have left the hospital  · Your care plan at home      You may receive a survey regarding the care you received during your stay. Your input is valuable to us. We encourage you to complete and return your survey in the envelope provided. We hope you will choose us in the future for your healthcare needs. Patient Information     Patient Name Jacqui Delaney 1960      Care Provided at:     Name Address Phone       [address] [phone]            Your Visit    Here you will find information about your visit, including the reason for your visit. Please take this sheet with you when you visit your doctor or other health care provider in the future. It will help determine the best possible medical care for you at that time. If you have any questions once you leave the hospital, please call the department phone number listed below. Diagnoses this visit     Your diagnoses were MIGRAINE WITHOUT STATUS MIGRAINOSUS, NOT INTRACTABLE, UNSPECIFIED MIGRAINE TYPE and SEIZURE ([facility] [address] Visit Information     Date of Visit Department Dept Phone    10/3/2017 [address] EM[facility]PT [phone]      You were seen by     You were seen by Carmela Barron MD and PARVEEN Henry. Follow-up Appointments    Below is a list of your follow-up and future appointments. This may not be a complete list as you may have made appointments directly with providers that we are not aware of or your providers may have made some for you. Please call your providers to confirm appointments. It is important to keep your appointments. Please bring your current insurance card, photo ID, co-pay, and all medication bottles to your appointment. If self-pay, payment is expected at the time of service. Follow-up Information     Follow up with PARVEEN Rubi. Schedule an appointment as soon as possible for a visit in 2 days. Specialties:  Family Nurse Practitioner, Family Medicine    Contact information:    [address]  [phone]          Follow up with iSmona Mejia MD. Schedule an appointment as soon as possible for a visit in 1 week. Specialty:  Neurology    Contact information:    [address] [address]  [phone]        Future Appointments     10/3/2017 1:45 PM     Appointment with L [facility]MO RM 1 at [facility] ([phone])   * Arrive 5 minutes prior to appointment. * No lotions, powders, or deodorants under the arms or in the breast area. * Bring previous mammogram films if not done here. * Report to [facility] [address] to register. Take the elevators to the second floor. Upon exiting the elevator you will face the front door of the [facility]. [address]       10/23/2017 2:00 PM     Appointment with PARVEEN Rubi at [facility] ([phone])   Please arrive 15 minutes prior to appointment, bring photo ID and insurance card. [address] [facility]       10/26/2017 1:50 PM     Appointment with PARVEEN Jenkins at [facility] ([phone])   Please arrive 15 minutes prior to scheduled appointment time to complete paper work, bring photo ID and insurance cards. [address] [facility]       10/31/2017 2:30 PM     Appointment with PARVEEN Mathews at [facility] OB[facility] ([phone])   Please arrive 15 minutes prior to scheduled appointment time to complete paper work, bring photo ID and insurance cards. Call the [facility] NOW ([phone])    Smoking harms nonsmokers. When nonsmokers are around people who smoke, they absorb nicotine, carbon monoxide, and other ingredients of tobacco smoke. DO NOT SMOKE AROUND CHILDREN     Children exposed to secondhand smoke are at an increased risk of:  Sudden Infant Death Syndrome (SIDS), acute respiratory infections, inflammation of the middle ear, and severe asthma. Over a longer time, it causes heart disease and lung cancer. There is no safe level of exposure to secondhand smoke. Important information for a smoker       SMOKING: QUIT SMOKING. THIS IS THE MOST IMPORTANT ACTION YOU CAN TAKE TO IMPROVE YOUR CURRENT AND FUTURE HEALTH. Call the [facility] NOW ([phone])    Smoking harms nonsmokers. When nonsmokers are around people who smoke, they absorb nicotine, carbon monoxide, and other ingredients of tobacco smoke. DO NOT SMOKE AROUND CHILDREN     Children exposed to secondhand smoke are at an increased risk of:  Sudden Infant Death Syndrome (SIDS), acute respiratory infections, inflammation of the middle ear, and severe asthma. Over a longer time, it causes heart disease and lung cancer. There is no safe level of exposure to secondhand smoke. [employer] Signup     ABS allows you to send messages to your doctor, view your test results, renew your prescriptions, schedule appointments, view visit notes, and more. How Do I Sign Up? 1. In your Internet browser, go to [email or web address]  2. Click on the Sign Up Now link in the Sign In box. You will see the New Member Sign Up page. 3. Enter your ABS Access Code exactly as it appears below. You will not need to use this code after youve completed the sign-up process. If you do not sign up before the expiration date, you must request a new code. Submittable Access Code: [email or web address]  Expires: 10/23/2017 12:40 PM    4. Enter your Social Security Number ([id number]) and Date of Birth (mm/dd/yyyy) as indicated and click Submit. You will be taken to the next sign-up page. 5. Create a [employer] ID. This will be your Submittable login ID and cannot be changed, so think of one that is secure and easy to remember. 6. Create a Submittable password. You can change your password at any time. 7. Enter your Password Reset Question and Answer. This can be used at a later time if you forget your password. 8. Enter your e-mail address. You will receive e-mail notification when new information is available in [address]. 9. Click Sign Up. You can now view your medical record. Additional Information  If you have questions, please contact the physician practice where you receive care. Remember, Submittable is NOT to be used for urgent needs. For medical emergencies, dial 911. For questions regarding your [employer] account call [phone]. If you have a clinical question, please call your doctor's office. View your information online  ? Review your current list of  medications, immunization, and allergies. ? Review your future test results online . ? Review your discharge instructions provided by your caregivers at discharge    Certain functionality such as prescription refills, scheduling appointments or sending messages to your provider are not activated if your provider does not use [employer] in his/her office    For questions regarding your [employer] account call [phone]. If you have a clinical question, please call your doctor's office. The information on all pages of the After Visit Summary has been reviewed with me, the patient and/or responsible adult, by my health care provider(s). I had the opportunity to ask questions regarding this information.  I understand I should dispose of my armband safely at home to protect my health information. A complete copy of the After Visit Summary has been given to me, the patient and/or responsible adult. Patient Signature/Responsible Adult: ___________________________________    Nurse Signature: ___________________________________  Resident/MLP Signature: ___________________________________  Attending Signature: ___________________________________    Date:____________Time:____________              Discharge Instructions            Migraine Headache: Care Instructions  Your Care Instructions  Migraines are painful, throbbing headaches that often start on one side of the head. They may cause nausea and vomiting and make you sensitive to light, sound, or smell. Without treatment, migraines can last from 4 hours to a few days. Medicines can help prevent migraines or stop them after they have started. Your doctor can help you find which ones work best for you. Follow-up care is a key part of your treatment and safety. Be sure to make and go to all appointments, and call your doctor if you are having problems. It's also a good idea to know your test results and keep a list of the medicines you take. How can you care for yourself at home? · Do not drive if you have taken a prescription pain medicine. · Rest in a quiet, dark room until your headache is gone. Close your eyes, and try to relax or go to sleep. Don't watch TV or read. · Put a cold, moist cloth or cold pack on the painful area for 10 to 20 minutes at a time. Put a thin cloth between the cold pack and your skin. · Use a warm, moist towel or a heating pad set on low to relax tight shoulder and neck muscles. · Have someone gently massage your neck and shoulders. · Take your medicines exactly as prescribed. Call your doctor if you think you are having a problem with your medicine. You will get more details on the specific medicines your doctor prescribes. · Identify and avoid things that may make you more likely to have a seizure. These may include lack of sleep, alcohol or drug use, stress, or not eating. · Make sure you go to your follow-up appointment. When should you call for help? Call 911 anytime you think you may need emergency care. For example, call if:  · You have another seizure. · You have more than one seizure in 24 hours. · You have new symptoms, such as trouble walking, speaking, or thinking clearly. Call your doctor now or seek immediate medical care if:  · You are not acting normally. Watch closely for changes in your health, and be sure to contact your doctor if you have any problems. Where can you learn more? Go to [email or web address] and sign in to your [employer] account. Enter B234 in the [employer] box to learn more about \"Seizure: Care Instructions. \"     If you do not have an account, please click on the \"Sign Up Now\" link. Current as of: October 14, 2016  Content Version: 11.3  © 4547-4399 [employer]. Care instructions adapted under license by [facility] ([facility]). If you have questions about a medical condition or this instruction, always ask your healthcare professional. [place] [address] any warranty or liability for your use of this information.       NO DRIVING UNTIL CLEARED BY YOUR DOCTOR

## 2017-10-23 ENCOUNTER — OFFICE VISIT (OUTPATIENT)
Dept: PRIMARY CARE CLINIC | Age: 57
End: 2017-10-23
Payer: MEDICARE

## 2017-10-23 VITALS
WEIGHT: 106 LBS | HEART RATE: 102 BPM | TEMPERATURE: 97.8 F | SYSTOLIC BLOOD PRESSURE: 110 MMHG | DIASTOLIC BLOOD PRESSURE: 70 MMHG | BODY MASS INDEX: 16.64 KG/M2 | HEIGHT: 67 IN | OXYGEN SATURATION: 97 %

## 2017-10-23 DIAGNOSIS — G89.29 CHRONIC BILATERAL LOW BACK PAIN, WITH SCIATICA PRESENCE UNSPECIFIED: ICD-10-CM

## 2017-10-23 DIAGNOSIS — F41.1 GAD (GENERALIZED ANXIETY DISORDER): Primary | ICD-10-CM

## 2017-10-23 DIAGNOSIS — F43.21 GRIEF REACTION: ICD-10-CM

## 2017-10-23 DIAGNOSIS — M54.5 CHRONIC BILATERAL LOW BACK PAIN, WITH SCIATICA PRESENCE UNSPECIFIED: ICD-10-CM

## 2017-10-23 DIAGNOSIS — F41.0 PANIC: ICD-10-CM

## 2017-10-23 PROCEDURE — G8427 DOCREV CUR MEDS BY ELIG CLIN: HCPCS | Performed by: NURSE PRACTITIONER

## 2017-10-23 PROCEDURE — G8419 CALC BMI OUT NRM PARAM NOF/U: HCPCS | Performed by: NURSE PRACTITIONER

## 2017-10-23 PROCEDURE — 3014F SCREEN MAMMO DOC REV: CPT | Performed by: NURSE PRACTITIONER

## 2017-10-23 PROCEDURE — 99213 OFFICE O/P EST LOW 20 MIN: CPT | Performed by: NURSE PRACTITIONER

## 2017-10-23 PROCEDURE — 4004F PT TOBACCO SCREEN RCVD TLK: CPT | Performed by: NURSE PRACTITIONER

## 2017-10-23 PROCEDURE — 3017F COLORECTAL CA SCREEN DOC REV: CPT | Performed by: NURSE PRACTITIONER

## 2017-10-23 PROCEDURE — G8484 FLU IMMUNIZE NO ADMIN: HCPCS | Performed by: NURSE PRACTITIONER

## 2017-10-23 RX ORDER — DIAZEPAM 10 MG/1
10 TABLET ORAL EVERY 8 HOURS PRN
Qty: 90 TABLET | Refills: 0 | Status: SHIPPED | OUTPATIENT
Start: 2017-10-23 | End: 2017-11-22 | Stop reason: SDUPTHER

## 2017-10-23 ASSESSMENT — ENCOUNTER SYMPTOMS
EYE DISCHARGE: 0
RHINORRHEA: 0
CONSTIPATION: 0
TROUBLE SWALLOWING: 0
BACK PAIN: 1
WHEEZING: 0
COUGH: 0
EYE REDNESS: 0
ABDOMINAL PAIN: 0
SORE THROAT: 0
DIARRHEA: 0
BLOOD IN STOOL: 0
NAUSEA: 1

## 2017-10-23 NOTE — PROGRESS NOTES
DTaP/Tdap/Td vaccine (1 - Tdap) 01/12/1980    Pneumococcal med risk (1 of 1 - PPSV23) 01/12/1980    Cervical cancer screen  01/12/1982    Lipid screen  01/12/2001    Breast cancer screen  01/12/2011    Colon cancer screen colonoscopy  01/12/2011    Flu vaccine (1) 09/01/2017        Subjective:      Review of Systems   Constitutional: Negative for appetite change and unexpected weight change. HENT: Negative for congestion, rhinorrhea, sore throat and trouble swallowing. Eyes: Negative for discharge and redness. Respiratory: Negative for cough and wheezing. Cardiovascular: Negative for chest pain. Gastrointestinal: Positive for nausea. Negative for abdominal pain, blood in stool, constipation and diarrhea. Genitourinary: Negative for dysuria. Musculoskeletal: Positive for arthralgias, back pain and myalgias. Skin: Negative for rash. Neurological: Positive for seizures and headaches. Negative for weakness. Hematological: Negative for adenopathy. Psychiatric/Behavioral: Positive for dysphoric mood and sleep disturbance. Negative for suicidal ideas. The patient is nervous/anxious. Objective:     Physical Exam   Constitutional: She is oriented to person, place, and time. She appears well-developed and well-nourished. HENT:   Head: Normocephalic. Right Ear: Tympanic membrane normal.   Left Ear: Tympanic membrane normal.   Eyes: Conjunctivae are normal.   Neck: Normal range of motion. Neck supple. Cardiovascular: Normal rate, regular rhythm and normal heart sounds. No murmur heard. Pulmonary/Chest: Effort normal and breath sounds normal.   Abdominal: Soft. Bowel sounds are normal. There is no tenderness. Stimulator in place. Musculoskeletal: Normal range of motion. She exhibits no edema. Lumbar back: She exhibits pain. Neurological: She is alert and oriented to person, place, and time. She has normal reflexes. Skin: Skin is warm and dry.    Psychiatric: Her behavior is normal. Her mood appears anxious. Vitals reviewed. /70   Pulse 102   Temp 97.8 °F (36.6 °C) (Temporal)   Ht 5' 7\" (1.702 m)   Wt 106 lb (48.1 kg)   SpO2 97%   BMI 16.60 kg/m²     Assessment:        ICD-10-CM ICD-9-CM    1. DANIE (generalized anxiety disorder) F41.1 300.02 diazepam (VALIUM) 10 MG tablet   2. Panic F41.0 300.01 diazepam (VALIUM) 10 MG tablet   3. Grief reaction F43.20 309.0 diazepam (VALIUM) 10 MG tablet       Plan: Will change ativan to valium . Discussed doing this tid for short term. Controlled Substances Monitoring:     Attestation: The Prescription Monitoring Report for this patient was reviewed today. ([id number]) (PARVEEN Lees)  Documentation: Possible medication side effects, risk of tolerance and/or dependence, and alternative treatments discussed. , Existing medication contract. PARVEEN Tejeda)  Chronic Pain: Treatment objectives documented - patient is progressing appropriately. PARVEEN Tejeda)    No Follow-up on file. No orders of the defined types were placed in this encounter. Orders Placed This Encounter   Medications    diazepam (VALIUM) 10 MG tablet     Sig: Take 1 tablet by mouth every 8 hours as needed for Anxiety     Dispense:  90 tablet     Refill:  0         Discussed use, benefit, and side effects of prescribed medications. All patient questions answered. Pt voiced understanding. Reviewed health maintenance. .  Patient agreed with treatment plan. Follow up as directed. There are no Patient Instructions on file for this visit.       Electronically signed by PARVEEN Lees on 10/23/2017 at 1:50 PM

## 2017-10-31 DIAGNOSIS — F32.A DEPRESSION, UNSPECIFIED DEPRESSION TYPE: ICD-10-CM

## 2017-11-01 RX ORDER — BUPROPION HYDROCHLORIDE 150 MG/1
150 TABLET ORAL EVERY MORNING
Qty: 90 TABLET | Refills: 1 | Status: SHIPPED | OUTPATIENT
Start: 2017-11-01 | End: 2017-11-22 | Stop reason: SDUPTHER

## 2017-11-07 DIAGNOSIS — F41.9 ANXIETY: ICD-10-CM

## 2017-11-07 DIAGNOSIS — F32.A DEPRESSION, UNSPECIFIED DEPRESSION TYPE: ICD-10-CM

## 2017-11-07 RX ORDER — FLUOXETINE HYDROCHLORIDE 40 MG/1
40 CAPSULE ORAL DAILY
Qty: 90 CAPSULE | Refills: 1 | Status: SHIPPED | OUTPATIENT
Start: 2017-11-07 | End: 2018-03-01 | Stop reason: SDUPTHER

## 2017-11-22 ENCOUNTER — TELEPHONE (OUTPATIENT)
Dept: PRIMARY CARE CLINIC | Age: 57
End: 2017-11-22

## 2017-11-22 ENCOUNTER — OFFICE VISIT (OUTPATIENT)
Dept: PRIMARY CARE CLINIC | Age: 57
End: 2017-11-22
Payer: MEDICARE

## 2017-11-22 VITALS
DIASTOLIC BLOOD PRESSURE: 74 MMHG | HEART RATE: 67 BPM | TEMPERATURE: 97.6 F | WEIGHT: 108 LBS | RESPIRATION RATE: 18 BRPM | SYSTOLIC BLOOD PRESSURE: 122 MMHG | OXYGEN SATURATION: 98 % | HEIGHT: 67 IN | BODY MASS INDEX: 16.95 KG/M2

## 2017-11-22 DIAGNOSIS — Z11.4 SCREENING FOR HIV (HUMAN IMMUNODEFICIENCY VIRUS): ICD-10-CM

## 2017-11-22 DIAGNOSIS — F41.0 PANIC: ICD-10-CM

## 2017-11-22 DIAGNOSIS — D53.9 MACROCYTIC ANEMIA: Primary | ICD-10-CM

## 2017-11-22 DIAGNOSIS — Z23 NEED FOR IMMUNIZATION AGAINST INFLUENZA: Primary | ICD-10-CM

## 2017-11-22 DIAGNOSIS — G43.909 MIGRAINE WITHOUT STATUS MIGRAINOSUS, NOT INTRACTABLE, UNSPECIFIED MIGRAINE TYPE: ICD-10-CM

## 2017-11-22 DIAGNOSIS — F41.1 GAD (GENERALIZED ANXIETY DISORDER): ICD-10-CM

## 2017-11-22 DIAGNOSIS — Z11.59 NEED FOR HEPATITIS C SCREENING TEST: ICD-10-CM

## 2017-11-22 DIAGNOSIS — E03.9 HYPOTHYROIDISM, UNSPECIFIED TYPE: ICD-10-CM

## 2017-11-22 DIAGNOSIS — R56.9 SEIZURES (HCC): ICD-10-CM

## 2017-11-22 DIAGNOSIS — F32.A DEPRESSION, UNSPECIFIED DEPRESSION TYPE: ICD-10-CM

## 2017-11-22 DIAGNOSIS — F43.21 GRIEF REACTION: ICD-10-CM

## 2017-11-22 DIAGNOSIS — D51.9 ANEMIA DUE TO VITAMIN B12 DEFICIENCY, UNSPECIFIED B12 DEFICIENCY TYPE: ICD-10-CM

## 2017-11-22 DIAGNOSIS — Z23 NEED FOR 23-POLYVALENT PNEUMOCOCCAL POLYSACCHARIDE VACCINE: ICD-10-CM

## 2017-11-22 DIAGNOSIS — D64.9 ANEMIA, UNSPECIFIED TYPE: Primary | ICD-10-CM

## 2017-11-22 DIAGNOSIS — G47.00 INSOMNIA, UNSPECIFIED TYPE: ICD-10-CM

## 2017-11-22 DIAGNOSIS — D52.9 ANEMIA DUE TO FOLIC ACID DEFICIENCY, UNSPECIFIED DEFICIENCY TYPE: ICD-10-CM

## 2017-11-22 LAB
ALBUMIN SERPL-MCNC: 3.9 G/DL (ref 3.5–5.2)
ALP BLD-CCNC: 67 U/L (ref 35–104)
ALT SERPL-CCNC: 7 U/L (ref 5–33)
ANION GAP SERPL CALCULATED.3IONS-SCNC: 13 MMOL/L (ref 7–19)
AST SERPL-CCNC: 16 U/L (ref 5–32)
BASOPHILS ABSOLUTE: 0.1 K/UL (ref 0–0.2)
BASOPHILS RELATIVE PERCENT: 0.8 % (ref 0–1)
BILIRUB SERPL-MCNC: <0.2 MG/DL (ref 0.2–1.2)
BUN BLDV-MCNC: 9 MG/DL (ref 6–20)
CALCIUM SERPL-MCNC: 8.6 MG/DL (ref 8.6–10)
CHLORIDE BLD-SCNC: 95 MMOL/L (ref 98–111)
CO2: 24 MMOL/L (ref 22–29)
CREAT SERPL-MCNC: 0.8 MG/DL (ref 0.5–0.9)
CREATININE URINE: 26.4 MG/DL (ref 4.2–622)
EOSINOPHILS ABSOLUTE: 0.2 K/UL (ref 0–0.6)
EOSINOPHILS RELATIVE PERCENT: 2.4 % (ref 0–5)
FOLATE: 4 NG/ML (ref 4.8–37.3)
GFR NON-AFRICAN AMERICAN: >60
GLUCOSE BLD-MCNC: 87 MG/DL (ref 74–109)
HAV IGM SER IA-ACNC: NORMAL
HCT VFR BLD CALC: 31.3 % (ref 37–47)
HEMOGLOBIN: 10 G/DL (ref 12–16)
HEPATITIS B CORE IGM ANTIBODY: NORMAL
HEPATITIS B SURFACE ANTIGEN INTERPRETATION: NORMAL
HEPATITIS C ANTIBODY INTERPRETATION: NORMAL
LYMPHOCYTES ABSOLUTE: 3 K/UL (ref 1.1–4.5)
LYMPHOCYTES RELATIVE PERCENT: 38.1 % (ref 20–40)
MCH RBC QN AUTO: 32.8 PG (ref 27–31)
MCHC RBC AUTO-ENTMCNC: 31.9 G/DL (ref 33–37)
MCV RBC AUTO: 102.6 FL (ref 81–99)
MICROALBUMIN UR-MCNC: <1.2 MG/DL (ref 0–19)
MICROALBUMIN/CREAT UR-RTO: NORMAL MG/G
MONOCYTES ABSOLUTE: 0.7 K/UL (ref 0–0.9)
MONOCYTES RELATIVE PERCENT: 9.1 % (ref 0–10)
NEUTROPHILS ABSOLUTE: 3.9 K/UL (ref 1.5–7.5)
NEUTROPHILS RELATIVE PERCENT: 49.5 % (ref 50–65)
PDW BLD-RTO: 11.4 % (ref 11.5–14.5)
PLATELET # BLD: 229 K/UL (ref 130–400)
PMV BLD AUTO: 10.1 FL (ref 9.4–12.3)
POTASSIUM SERPL-SCNC: 3.7 MMOL/L (ref 3.5–5)
RAPID HIV 1&2: NORMAL
RBC # BLD: 3.05 M/UL (ref 4.2–5.4)
SODIUM BLD-SCNC: 132 MMOL/L (ref 136–145)
T4 FREE: 1 NG/DL (ref 0.9–1.7)
TOTAL PROTEIN: 6.2 G/DL (ref 6.6–8.7)
TSH SERPL DL<=0.05 MIU/L-ACNC: 3.01 UIU/ML (ref 0.27–4.2)
VITAMIN B-12: 381 PG/ML (ref 211–946)
WBC # BLD: 7.9 K/UL (ref 4.8–10.8)

## 2017-11-22 PROCEDURE — 90686 IIV4 VACC NO PRSV 0.5 ML IM: CPT | Performed by: NURSE PRACTITIONER

## 2017-11-22 PROCEDURE — G8419 CALC BMI OUT NRM PARAM NOF/U: HCPCS | Performed by: NURSE PRACTITIONER

## 2017-11-22 PROCEDURE — G0008 ADMIN INFLUENZA VIRUS VAC: HCPCS | Performed by: NURSE PRACTITIONER

## 2017-11-22 PROCEDURE — 90732 PPSV23 VACC 2 YRS+ SUBQ/IM: CPT | Performed by: NURSE PRACTITIONER

## 2017-11-22 PROCEDURE — 3014F SCREEN MAMMO DOC REV: CPT | Performed by: NURSE PRACTITIONER

## 2017-11-22 PROCEDURE — 4004F PT TOBACCO SCREEN RCVD TLK: CPT | Performed by: NURSE PRACTITIONER

## 2017-11-22 PROCEDURE — 3017F COLORECTAL CA SCREEN DOC REV: CPT | Performed by: NURSE PRACTITIONER

## 2017-11-22 PROCEDURE — G8427 DOCREV CUR MEDS BY ELIG CLIN: HCPCS | Performed by: NURSE PRACTITIONER

## 2017-11-22 PROCEDURE — G8484 FLU IMMUNIZE NO ADMIN: HCPCS | Performed by: NURSE PRACTITIONER

## 2017-11-22 PROCEDURE — G0009 ADMIN PNEUMOCOCCAL VACCINE: HCPCS | Performed by: NURSE PRACTITIONER

## 2017-11-22 PROCEDURE — 99214 OFFICE O/P EST MOD 30 MIN: CPT | Performed by: NURSE PRACTITIONER

## 2017-11-22 RX ORDER — BUPROPION HYDROCHLORIDE 300 MG/1
300 TABLET ORAL EVERY MORNING
Qty: 90 TABLET | Refills: 5 | Status: SHIPPED | OUTPATIENT
Start: 2017-11-22

## 2017-11-22 RX ORDER — DIAZEPAM 10 MG/1
10 TABLET ORAL EVERY 8 HOURS PRN
Qty: 90 TABLET | Refills: 0 | Status: SHIPPED | OUTPATIENT
Start: 2017-11-22 | End: 2017-12-22

## 2017-11-22 RX ORDER — TRAZODONE HYDROCHLORIDE 100 MG/1
TABLET ORAL
Qty: 180 TABLET | Refills: 1 | Status: SHIPPED | OUTPATIENT
Start: 2017-11-22 | End: 2018-02-12

## 2017-11-22 RX ORDER — RIZATRIPTAN BENZOATE 5 MG/1
5 TABLET ORAL
Qty: 30 TABLET | Refills: 3 | Status: SHIPPED | OUTPATIENT
Start: 2017-11-22 | End: 2018-03-01 | Stop reason: SDUPTHER

## 2017-11-22 RX ORDER — BENZONATATE 100 MG/1
100 CAPSULE ORAL 3 TIMES DAILY PRN
Qty: 60 CAPSULE | Refills: 1 | Status: SHIPPED | OUTPATIENT
Start: 2017-11-22 | End: 2017-11-29

## 2017-11-22 ASSESSMENT — ENCOUNTER SYMPTOMS
ABDOMINAL PAIN: 0
NAUSEA: 1
EYE DISCHARGE: 0
BLOOD IN STOOL: 0
COUGH: 1
SORE THROAT: 0
TROUBLE SWALLOWING: 0
BACK PAIN: 1
CONSTIPATION: 0
DIARRHEA: 0
RHINORRHEA: 0
EYE REDNESS: 0
WHEEZING: 0

## 2017-11-22 NOTE — PROGRESS NOTES
Radha [address]  [employer], [address] [place] [address] Rd  Phone [phone]   Fax [phone]      OFFICE VISIT: 11/22/2017    Fidel Sotomayor is a 65 y.o. female who presents today for her medical conditions/complaints as noted below. Fidel Sotomayor is c/o of Migraine (Medication ); Flu Vaccine (wants flu shot); and Anxiety (Medication Refill)        HPI:     HPI  Anxiety  Her  passed away last month. States she is having panic attacks and is highly anxious. Depression  Feels she needs to go back up to wellbutrin 300mg     She hasn't been able to go to her other appt that were scheduled due to not having a car. She is going to try to get one next week. Migraines  Needs refill on maxalt. Hasn't had lab work. She will get this done today. Past Medical History:   Diagnosis Date    Allergic rhinitis     Anxiety     Asthma     B12 deficiency     Depression     Gastroparesis     Headache     Hypothyroid     Seizures (HCC)       Past Surgical History:   Procedure Laterality Date    CHOLECYSTECTOMY      COLON SURGERY      GASTRIC STIMULATOR IMPLANT SURGERY      HERNIA REPAIR      TUBAL LIGATION         History reviewed. No pertinent family history. Social History   Substance Use Topics    Smoking status: Current Every Day Smoker     Types: Cigarettes    Smokeless tobacco: Never Used    Alcohol use No      Current Outpatient Prescriptions   Medication Sig Dispense Refill    diazepam (VALIUM) 10 MG tablet Take 1 tablet by mouth every 8 hours as needed for Anxiety .  90 tablet 0    traZODone (DESYREL) 100 MG tablet Take 2 tablets by mouth at bedtime 180 tablet 1    benzonatate (TESSALON PERLES) 100 MG capsule Take 1 capsule by mouth 3 times daily as needed for Cough 60 capsule 1    buPROPion (WELLBUTRIN XL) 300 MG extended release tablet Take 1 tablet by mouth every morning 90 tablet 5    rizatriptan (MAXALT) 5 MG tablet Take 1 tablet by mouth once as needed for Migraine May repeat in 2 hours if needed 30 tablet 3    FLUoxetine (PROZAC) 40 MG capsule Take 1 capsule by mouth daily 90 capsule 1    loratadine (CLARITIN) 10 MG tablet Take 1 tablet by mouth daily 30 tablet 3    clobetasol (TEMOVATE) 0.05 % cream Apply topically 2 times daily Apply topically 2 times daily. 60 g 1    butalbital-acetaminophen-caffeine (FIORICET, ESGIC) -40 MG per tablet Take 1 tablet by mouth every 8 hours as needed for Migraine 30 tablet 0    topiramate (TOPAMAX) 200 MG tablet Indications: TAKE 2 TABLETS BY MOUTH AT BEDTIME Take 2 tablets by mouth at bedtime 180 tablet 1    levothyroxine (SYNTHROID) 50 MCG tablet Take 1 tablet by mouth Daily 90 tablet 1    linaclotide (LINZESS) 290 MCG CAPS capsule Take 1 capsule by mouth every morning (before breakfast) 90 capsule 1    montelukast (SINGULAIR) 10 MG tablet Take 1 tablet by mouth nightly 90 tablet 1    pantoprazole (PROTONIX) 40 MG tablet Take 1 tablet by mouth 2 times daily 180 tablet 1    tiotropium (SPIRIVA) 18 MCG inhalation capsule Inhale 1 capsule into the lungs daily 90 capsule 1    albuterol sulfate  (90 Base) MCG/ACT inhaler Inhale 2 puffs into the lungs every 6 hours as needed for Wheezing 3 Inhaler 1    cycloSPORINE (RESTASIS) 0.05 % ophthalmic emulsion Place 1 drop into both eyes 2 times daily      promethazine (PHENERGAN) 25 MG tablet Take 25 mg by mouth every 6 hours as needed for Nausea      promethazine (PHENERGAN) 50 MG tablet Take 50 mg by mouth every 6 hours as needed for Nausea       No current facility-administered medications for this visit.       Allergies   Allergen Reactions    Codeine     Pcn [Penicillins]      Does not know reaction      Percocet [Oxycodone-Acetaminophen]      Does agree with her system    Prednisone Other (See Comments)     Hyper, headache, anger    Sulfa Antibiotics Other (See Comments)     Headache       Health Maintenance   Topic Date Due    Hepatitis C screen  1960    HIV screen 01/12/1976    DTaP/Tdap/Td vaccine (1 - Tdap) 01/12/1980    Cervical cancer screen  01/12/1982    Lipid screen  01/12/2001    Breast cancer screen  01/12/2011    Colon cancer screen colonoscopy  01/12/2011    Flu vaccine  Completed    Pneumococcal med risk  Completed        Subjective:      Review of Systems   Constitutional: Negative for appetite change and unexpected weight change. HENT: Negative for congestion, rhinorrhea, sore throat and trouble swallowing. Eyes: Negative for discharge and redness. Respiratory: Positive for cough. Negative for wheezing. Cardiovascular: Negative for chest pain. Gastrointestinal: Positive for nausea. Negative for abdominal pain, blood in stool, constipation and diarrhea. Genitourinary: Negative for dysuria. Musculoskeletal: Positive for arthralgias, back pain and myalgias. Skin: Negative for rash. Neurological: Positive for seizures and headaches. Negative for weakness. Hematological: Negative for adenopathy. Psychiatric/Behavioral: Positive for dysphoric mood and sleep disturbance. Negative for suicidal ideas. The patient is nervous/anxious. Objective:     Physical Exam   Constitutional: She is oriented to person, place, and time. She appears well-developed and well-nourished. HENT:   Head: Normocephalic. Right Ear: Tympanic membrane normal.   Left Ear: Tympanic membrane normal.   Eyes: Conjunctivae are normal.   Neck: Normal range of motion. Neck supple. Cardiovascular: Normal rate, regular rhythm and normal heart sounds. No murmur heard. Pulmonary/Chest: Effort normal and breath sounds normal.   Abdominal: Soft. Bowel sounds are normal. There is no tenderness. Stimulator in place. Musculoskeletal: Normal range of motion. She exhibits no edema. Lumbar back: She exhibits pain. Neurological: She is alert and oriented to person, place, and time. She has normal reflexes. Skin: Skin is warm and dry.    Psychiatric: Her behavior is normal. Her mood appears anxious. Vitals reviewed. /74 (Site: Right Arm, Position: Sitting, Cuff Size: Medium Adult)   Pulse 67   Temp 97.6 °F (36.4 °C) (Temporal)   Resp 18   Ht 5' 7\" (1.702 m)   Wt 108 lb (49 kg)   SpO2 98%   BMI 16.92 kg/m²     Assessment:        ICD-10-CM ICD-9-CM    1. Need for immunization against influenza Z23 V04.81 INFLUENZA, QUADV, 3 YRS AND OLDER, IM, PF, PREFILL SYR OR SDV, 0.5ML (FLUZONE QUADV, PF)   2. DANIE (generalized anxiety disorder) F41.1 300.02 diazepam (VALIUM) 10 MG tablet   3. Panic F41.0 300.01 diazepam (VALIUM) 10 MG tablet   4. Grief reaction F43.20 309.0 diazepam (VALIUM) 10 MG tablet   5. Insomnia, unspecified type G47.00 780.52 traZODone (DESYREL) 100 MG tablet   6. Depression, unspecified depression type F32.9 311 buPROPion (WELLBUTRIN XL) 300 MG extended release tablet   7. Need for 23-polyvalent pneumococcal polysaccharide vaccine Z23 V03.82 Pneumococcal polysaccharide vaccine 23-valent greater than or equal to 1yo subcutaneous/IM   8. Migraine without status migrainosus, not intractable, unspecified migraine type G43.909 346.90 rizatriptan (MAXALT) 5 MG tablet       Plan:    Controlled Substances Monitoring:     Attestation: The Prescription Monitoring Report for this patient was reviewed today. ([id number]) (Rockland Angelucci, PARVEEN)  Documentation: Possible medication side effects, risk of tolerance and/or dependence, and alternative treatments discussed. , Existing medication contract. (Rockland Angelucci, PARVEEN)    Return in about 1 month (around 12/22/2017). Orders Placed This Encounter   Procedures    INFLUENZA, QUADV, 3 YRS AND OLDER, IM, PF, PREFILL SYR OR SDV, 0.5ML (FLUZONE QUADV, PF)    Pneumococcal polysaccharide vaccine 23-valent greater than or equal to 1yo subcutaneous/IM     Orders Placed This Encounter   Medications    diazepam (VALIUM) 10 MG tablet     Sig: Take 1 tablet by mouth every 8 hours as needed for Anxiety . Dispense:  90 tablet     Refill:  0    traZODone (DESYREL) 100 MG tablet     Sig: Take 2 tablets by mouth at bedtime     Dispense:  180 tablet     Refill:  1    benzonatate (TESSALON PERLES) 100 MG capsule     Sig: Take 1 capsule by mouth 3 times daily as needed for Cough     Dispense:  60 capsule     Refill:  1    buPROPion (WELLBUTRIN XL) 300 MG extended release tablet     Sig: Take 1 tablet by mouth every morning     Dispense:  90 tablet     Refill:  5    rizatriptan (MAXALT) 5 MG tablet     Sig: Take 1 tablet by mouth once as needed for Migraine May repeat in 2 hours if needed     Dispense:  30 tablet     Refill:  3    DISCONTD: pneumococcal polyvalent (PNEUMOVAX 23) 25 MCG/0.5ML inj     Sig: Inject 0.5 mLs into the muscle once for 1 dose     Dispense:  0.5 mL     Refill:  0         Discussed use, benefit, and side effects of prescribed medications. All patient questions answered. Pt voiced understanding. Reviewed health maintenance. .  Patient agreed with treatment plan. Follow up as directed. There are no Patient Instructions on file for this visit.       Electronically signed by PARVEEN Gutierrez on 11/22/2017 at 12:20 PM

## 2017-11-23 NOTE — TELEPHONE ENCOUNTER
----- Message from [facility], APRN sent at 11/22/2017  6:53 PM CST -----  Please notify patient the following  Vitamin B12 is within normal limits but it is low normal.   Folate is low. Recommend taking an over-the-counter complex B vitamin. That has both B12 and folate in it. Recommend repeating these values in 3 months.

## 2017-11-23 NOTE — TELEPHONE ENCOUNTER
----- Message from PARVEEN Pina sent at 11/22/2017  3:39 PM CST -----  Please call patient and let them know results. No pathologic protein in urine.

## 2017-11-25 LAB — TOPIRAMATE LEVEL: 7.4 UG/ML (ref 5–20)

## 2017-12-28 ENCOUNTER — OFFICE VISIT (OUTPATIENT)
Dept: PRIMARY CARE CLINIC | Age: 57
End: 2017-12-28
Payer: MEDICARE

## 2017-12-28 VITALS
WEIGHT: 99 LBS | HEIGHT: 67 IN | OXYGEN SATURATION: 99 % | BODY MASS INDEX: 15.54 KG/M2 | HEART RATE: 86 BPM | TEMPERATURE: 97.8 F

## 2017-12-28 DIAGNOSIS — G47.00 INSOMNIA, UNSPECIFIED TYPE: ICD-10-CM

## 2017-12-28 DIAGNOSIS — F41.1 GAD (GENERALIZED ANXIETY DISORDER): Primary | ICD-10-CM

## 2017-12-28 DIAGNOSIS — R11.0 CHRONIC NAUSEA: ICD-10-CM

## 2017-12-28 DIAGNOSIS — Z91.09 ENVIRONMENTAL ALLERGIES: ICD-10-CM

## 2017-12-28 DIAGNOSIS — G43.909 MIGRAINE WITHOUT STATUS MIGRAINOSUS, NOT INTRACTABLE, UNSPECIFIED MIGRAINE TYPE: ICD-10-CM

## 2017-12-28 DIAGNOSIS — F33.41 MAJOR DEPRESSIVE DISORDER, RECURRENT, IN PARTIAL REMISSION (HCC): ICD-10-CM

## 2017-12-28 PROCEDURE — G8427 DOCREV CUR MEDS BY ELIG CLIN: HCPCS | Performed by: NURSE PRACTITIONER

## 2017-12-28 PROCEDURE — 4004F PT TOBACCO SCREEN RCVD TLK: CPT | Performed by: NURSE PRACTITIONER

## 2017-12-28 PROCEDURE — G8419 CALC BMI OUT NRM PARAM NOF/U: HCPCS | Performed by: NURSE PRACTITIONER

## 2017-12-28 PROCEDURE — 99214 OFFICE O/P EST MOD 30 MIN: CPT | Performed by: NURSE PRACTITIONER

## 2017-12-28 PROCEDURE — 3017F COLORECTAL CA SCREEN DOC REV: CPT | Performed by: NURSE PRACTITIONER

## 2017-12-28 PROCEDURE — 3014F SCREEN MAMMO DOC REV: CPT | Performed by: NURSE PRACTITIONER

## 2017-12-28 PROCEDURE — G8484 FLU IMMUNIZE NO ADMIN: HCPCS | Performed by: NURSE PRACTITIONER

## 2017-12-28 RX ORDER — DIAZEPAM 10 MG/1
10 TABLET ORAL EVERY 6 HOURS PRN
COMMUNITY
End: 2017-12-28 | Stop reason: SDUPTHER

## 2017-12-28 RX ORDER — FLUOXETINE HYDROCHLORIDE 20 MG/1
20 CAPSULE ORAL DAILY
Qty: 30 CAPSULE | Refills: 0 | Status: SHIPPED | OUTPATIENT
Start: 2017-12-28 | End: 2018-01-26

## 2017-12-28 RX ORDER — PROMETHAZINE HYDROCHLORIDE 25 MG/1
25 TABLET ORAL EVERY 6 HOURS PRN
Qty: 60 TABLET | Refills: 2 | Status: SHIPPED | OUTPATIENT
Start: 2017-12-28 | End: 2018-02-12

## 2017-12-28 RX ORDER — PROMETHAZINE HCL 50 MG
50 TABLET ORAL EVERY 6 HOURS PRN
Status: CANCELLED | OUTPATIENT
Start: 2017-12-28

## 2017-12-28 RX ORDER — DIAZEPAM 10 MG/1
10 TABLET ORAL EVERY 6 HOURS PRN
Qty: 90 TABLET | Refills: 0 | Status: SHIPPED | OUTPATIENT
Start: 2017-12-28 | End: 2018-01-26 | Stop reason: SDUPTHER

## 2017-12-28 RX ORDER — CLOBETASOL PROPIONATE 0.5 MG/G
CREAM TOPICAL 2 TIMES DAILY
Qty: 1 TUBE | Refills: 5 | Status: SHIPPED | OUTPATIENT
Start: 2017-12-28 | End: 2018-01-26

## 2017-12-28 RX ORDER — FLUOXETINE HYDROCHLORIDE 20 MG/1
20 CAPSULE ORAL DAILY
Qty: 30 CAPSULE | Refills: 0 | Status: SHIPPED | OUTPATIENT
Start: 2017-12-28 | End: 2017-12-28 | Stop reason: SDUPTHER

## 2017-12-28 RX ORDER — SUMATRIPTAN 50 MG/1
TABLET, FILM COATED ORAL
Qty: 30 TABLET | Refills: 3 | Status: SHIPPED | OUTPATIENT
Start: 2017-12-28 | End: 2018-03-01

## 2017-12-28 RX ORDER — MONTELUKAST SODIUM 10 MG/1
10 TABLET ORAL NIGHTLY
Qty: 90 TABLET | Refills: 1 | Status: SHIPPED | OUTPATIENT
Start: 2017-12-28 | End: 2018-02-12

## 2017-12-28 ASSESSMENT — ENCOUNTER SYMPTOMS
ABDOMINAL PAIN: 0
EYE REDNESS: 0
RHINORRHEA: 0
EYE DISCHARGE: 0
SORE THROAT: 0
DIARRHEA: 0
CONSTIPATION: 0
NAUSEA: 1
BACK PAIN: 1
BLOOD IN STOOL: 0
COUGH: 0
WHEEZING: 0
TROUBLE SWALLOWING: 0

## 2017-12-28 NOTE — PROGRESS NOTES
Radha [address]  [place], [address] [place] [address] Rd  Phone [phone]   Fax [phone]      OFFICE VISIT: 12/28/2017    Marco Antonio Estrada is a 65 y.o. female who presents today for her medical conditions/complaints as noted below. Marco Antonio Estrada is c/o of Anxiety; Headache (Wants something other Maxalt because insurance will only pay for 6 pills a month); and Medication Refill        HPI:     HPI  Migraines  Her insurance will only approve 6 maxalt per month. States she needs more than 6 per month. She was referred to neurology for HAs and seizure disorders. She didn't go due to transportation but she now has a vehicle. Mammogram  Have her the number to reschedule    Anxiety and depression. She feels that she need to wean down on the prozac and then restart it. Continues to have panic attacks. This got worse after the recent death of her . She is taking valium prn. Needs refill on singular. Past Medical History:   Diagnosis Date    Allergic rhinitis     Anxiety     Asthma     B12 deficiency     Depression     Gastroparesis     Headache     Hypothyroid     Seizures (HCC)       Past Surgical History:   Procedure Laterality Date    CHOLECYSTECTOMY      COLON SURGERY      GASTRIC STIMULATOR IMPLANT SURGERY      HERNIA REPAIR      TUBAL LIGATION         History reviewed. No pertinent family history. Social History   Substance Use Topics    Smoking status: Current Every Day Smoker     Types: Cigarettes    Smokeless tobacco: Never Used    Alcohol use No      Current Outpatient Prescriptions   Medication Sig Dispense Refill    montelukast (SINGULAIR) 10 MG tablet Take 1 tablet by mouth nightly 90 tablet 1    promethazine (PHENERGAN) 25 MG tablet Take 1 tablet by mouth every 6 hours as needed for Nausea 60 tablet 2    diazepam (VALIUM) 10 MG tablet Take 1 tablet by mouth every 6 hours as needed for Anxiety .  90 tablet 0    SUMAtriptan (IMITREX) 50 MG tablet Take 1 tablet po at oriented to person, place, and time. She has normal reflexes. Skin: Skin is warm and dry. Psychiatric: Her behavior is normal. Her mood appears anxious. Vitals reviewed. Pulse 86   Temp 97.8 °F (36.6 °C) (Temporal)   Ht 5' 7\" (1.702 m)   Wt 99 lb (44.9 kg)   SpO2 99%   BMI 15.51 kg/m²     Assessment:        ICD-10-CM ICD-9-CM    1. DANIE (generalized anxiety disorder) F41.1 300.02 diazepam (VALIUM) 10 MG tablet   2. Major depressive disorder, recurrent, in partial remission (HCC) F33.41 296.35 FLUoxetine (PROZAC) 20 MG capsule      DISCONTINUED: FLUoxetine (PROZAC) 20 MG capsule   3. Insomnia, unspecified type G47.00 780.52 diazepam (VALIUM) 10 MG tablet   4. Chronic nausea R11.0 787.02 promethazine (PHENERGAN) 25 MG tablet   5. Environmental allergies Z91.09 V15.09 montelukast (SINGULAIR) 10 MG tablet   6. Migraine without status migrainosus, not intractable, unspecified migraine type G43.909 346.90 SUMAtriptan (IMITREX) 50 MG tablet       Plan:   Patient was given the phone numbers to GI, GYN, neurology, scheduling for mammogram, and dermatology so that she can reschedule her appointments as our office has done this multiple times after the initial referral.        Controlled Substances Monitoring:     Attestation: The Prescription Monitoring Report for this patient was reviewed today. ([id number]) (PARVEEN Jett)  Documentation: Possible medication side effects, risk of tolerance and/or dependence, and alternative treatments discussed. , Existing medication contract. PARVEEN Khalil)    Return in about 1 month (around 1/28/2018) for medication refill. No orders of the defined types were placed in this encounter.     Orders Placed This Encounter   Medications    montelukast (SINGULAIR) 10 MG tablet     Sig: Take 1 tablet by mouth nightly     Dispense:  90 tablet     Refill:  1    promethazine (PHENERGAN) 25 MG tablet     Sig: Take 1 tablet by mouth every 6 hours as needed for Nausea Dispense:  60 tablet     Refill:  2    diazepam (VALIUM) 10 MG tablet     Sig: Take 1 tablet by mouth every 6 hours as needed for Anxiety . Dispense:  90 tablet     Refill:  0    DISCONTD: FLUoxetine (PROZAC) 20 MG capsule     Sig: Take 1 capsule by mouth daily     Dispense:  30 capsule     Refill:  0    SUMAtriptan (IMITREX) 50 MG tablet     Sig: Take 1 tablet po at onset of migraine. May repeat x 1 in 2 hours if needed. Dispense:  30 tablet     Refill:  3    clobetasol (TEMOVATE) 0.05 % cream     Sig: Apply topically 2 times daily Apply topically 2 times daily. Dispense:  1 Tube     Refill:  5    FLUoxetine (PROZAC) 20 MG capsule     Sig: Take 1 capsule by mouth daily     Dispense:  30 capsule     Refill:  0         Discussed use, benefit, and side effects of prescribed medications. All patient questions answered. Pt voiced understanding. Reviewed health maintenance. .  Patient agreed with treatment plan. Follow up as directed. Quality & Risk Score Accuracy - MEDICARE ADVANTAGE    Visit Dx: Major depressive disorder, recurrent, in partial remission ([facility] [address]  Stable/controlled based upon review of recent symptoms. Continue current treatment plan and follow up at least yearly. Last edited 12/28/17 11:55 CST by PARVEEN Cramer           There are no Patient Instructions on file for this visit.       Electronically signed by PARVEEN Cramer on 12/28/2017 at 2:33 PM

## 2018-01-26 ENCOUNTER — OFFICE VISIT (OUTPATIENT)
Dept: PRIMARY CARE CLINIC | Age: 58
End: 2018-01-26
Payer: MEDICARE

## 2018-01-26 VITALS
HEART RATE: 90 BPM | OXYGEN SATURATION: 97 % | TEMPERATURE: 99 F | SYSTOLIC BLOOD PRESSURE: 108 MMHG | WEIGHT: 99 LBS | DIASTOLIC BLOOD PRESSURE: 68 MMHG | HEIGHT: 66 IN | BODY MASS INDEX: 15.91 KG/M2

## 2018-01-26 DIAGNOSIS — J44.9 OBSTRUCTIVE CHRONIC BRONCHITIS WITHOUT EXACERBATION (HCC): ICD-10-CM

## 2018-01-26 DIAGNOSIS — F33.1 MODERATE EPISODE OF RECURRENT MAJOR DEPRESSIVE DISORDER (HCC): ICD-10-CM

## 2018-01-26 DIAGNOSIS — F41.1 GAD (GENERALIZED ANXIETY DISORDER): ICD-10-CM

## 2018-01-26 DIAGNOSIS — G47.00 INSOMNIA, UNSPECIFIED TYPE: ICD-10-CM

## 2018-01-26 DIAGNOSIS — F33.41 MAJOR DEPRESSIVE DISORDER, RECURRENT, IN PARTIAL REMISSION (HCC): ICD-10-CM

## 2018-01-26 PROCEDURE — G8484 FLU IMMUNIZE NO ADMIN: HCPCS | Performed by: NURSE PRACTITIONER

## 2018-01-26 PROCEDURE — G8427 DOCREV CUR MEDS BY ELIG CLIN: HCPCS | Performed by: NURSE PRACTITIONER

## 2018-01-26 PROCEDURE — 4004F PT TOBACCO SCREEN RCVD TLK: CPT | Performed by: NURSE PRACTITIONER

## 2018-01-26 PROCEDURE — G8419 CALC BMI OUT NRM PARAM NOF/U: HCPCS | Performed by: NURSE PRACTITIONER

## 2018-01-26 PROCEDURE — 3017F COLORECTAL CA SCREEN DOC REV: CPT | Performed by: NURSE PRACTITIONER

## 2018-01-26 PROCEDURE — G8926 SPIRO NO PERF OR DOC: HCPCS | Performed by: NURSE PRACTITIONER

## 2018-01-26 PROCEDURE — 3023F SPIROM DOC REV: CPT | Performed by: NURSE PRACTITIONER

## 2018-01-26 PROCEDURE — 3014F SCREEN MAMMO DOC REV: CPT | Performed by: NURSE PRACTITIONER

## 2018-01-26 PROCEDURE — 99213 OFFICE O/P EST LOW 20 MIN: CPT | Performed by: NURSE PRACTITIONER

## 2018-01-26 RX ORDER — FLUOXETINE HYDROCHLORIDE 20 MG/1
20 CAPSULE ORAL DAILY
Qty: 30 CAPSULE | Refills: 5 | Status: SHIPPED | OUTPATIENT
Start: 2018-01-26 | End: 2018-03-01

## 2018-01-26 RX ORDER — DIAZEPAM 10 MG/1
10 TABLET ORAL EVERY 6 HOURS PRN
Qty: 90 TABLET | Refills: 0 | Status: SHIPPED | OUTPATIENT
Start: 2018-01-26 | End: 2018-02-25

## 2018-01-26 ASSESSMENT — ENCOUNTER SYMPTOMS
EYE REDNESS: 0
DIARRHEA: 0
RHINORRHEA: 0
NAUSEA: 1
WHEEZING: 0
TROUBLE SWALLOWING: 0
EYE DISCHARGE: 0
BACK PAIN: 1
ABDOMINAL PAIN: 0
BLOOD IN STOOL: 0
COUGH: 0
CONSTIPATION: 0
SORE THROAT: 0

## 2018-01-26 NOTE — PROGRESS NOTES
tablet 5    FLUoxetine (PROZAC) 40 MG capsule Take 1 capsule by mouth daily 90 capsule 1    loratadine (CLARITIN) 10 MG tablet Take 1 tablet by mouth daily 30 tablet 3    clobetasol (TEMOVATE) 0.05 % cream Apply topically 2 times daily Apply topically 2 times daily. 60 g 1    topiramate (TOPAMAX) 200 MG tablet Indications: TAKE 2 TABLETS BY MOUTH AT BEDTIME Take 2 tablets by mouth at bedtime 180 tablet 1    levothyroxine (SYNTHROID) 50 MCG tablet Take 1 tablet by mouth Daily 90 tablet 1    linaclotide (LINZESS) 290 MCG CAPS capsule Take 1 capsule by mouth every morning (before breakfast) 90 capsule 1    pantoprazole (PROTONIX) 40 MG tablet Take 1 tablet by mouth 2 times daily 180 tablet 1    tiotropium (SPIRIVA) 18 MCG inhalation capsule Inhale 1 capsule into the lungs daily 90 capsule 1    albuterol sulfate  (90 Base) MCG/ACT inhaler Inhale 2 puffs into the lungs every 6 hours as needed for Wheezing 3 Inhaler 1    cycloSPORINE (RESTASIS) 0.05 % ophthalmic emulsion Place 1 drop into both eyes 2 times daily      rizatriptan (MAXALT) 5 MG tablet Take 1 tablet by mouth once as needed for Migraine May repeat in 2 hours if needed 30 tablet 3     No current facility-administered medications for this visit.       Allergies   Allergen Reactions    Codeine     Pcn [Penicillins]      Does not know reaction      Percocet [Oxycodone-Acetaminophen]      Does agree with her system    Prednisone Other (See Comments)     Hyper, headache, anger    Sulfa Antibiotics Other (See Comments)     Headache       Health Maintenance   Topic Date Due    DTaP/Tdap/Td vaccine (1 - Tdap) 01/12/1980    Cervical cancer screen  01/12/1982    Lipid screen  01/12/2001    Breast cancer screen  01/12/2011    Colon cancer screen colonoscopy  01/12/2011    TSH testing  11/22/2018    Flu vaccine  Completed    Pneumococcal med risk  Completed    Hepatitis C screen  Completed    HIV screen  Completed        Subjective: Review of Systems   Constitutional: Negative for appetite change and unexpected weight change. HENT: Negative for congestion, rhinorrhea, sore throat and trouble swallowing. Eyes: Negative for discharge and redness. Respiratory: Negative for cough and wheezing. Cardiovascular: Negative for chest pain. Gastrointestinal: Positive for nausea. Negative for abdominal pain, blood in stool, constipation and diarrhea. Genitourinary: Negative for dysuria. Musculoskeletal: Positive for arthralgias, back pain and myalgias. Skin: Negative for rash. Neurological: Positive for seizures and headaches. Negative for weakness. Hematological: Negative for adenopathy. Psychiatric/Behavioral: Positive for dysphoric mood and sleep disturbance. Negative for suicidal ideas. The patient is nervous/anxious. Objective:     Physical Exam   Constitutional: She is oriented to person, place, and time. She appears well-developed and well-nourished. HENT:   Head: Normocephalic. Right Ear: Tympanic membrane normal.   Left Ear: Tympanic membrane normal.   Eyes: Conjunctivae are normal.   Neck: Normal range of motion. Neck supple. Cardiovascular: Normal rate, regular rhythm and normal heart sounds. No murmur heard. Pulmonary/Chest: Effort normal and breath sounds normal.   Abdominal: Soft. Bowel sounds are normal. There is no tenderness. Stimulator in place. Musculoskeletal: Normal range of motion. She exhibits no edema. Lumbar back: She exhibits pain. Neurological: She is alert and oriented to person, place, and time. She has normal reflexes. Skin: Skin is warm and dry. Psychiatric: Her behavior is normal. Her mood appears anxious. Vitals reviewed. /68   Pulse 90   Temp 99 °F (37.2 °C) (Temporal)   Ht 5' 6\" (1.676 m)   Wt 99 lb (44.9 kg)   SpO2 97%   BMI 15.98 kg/m²     Assessment:        ICD-10-CM ICD-9-CM    1.  DANIE (generalized anxiety disorder) F41.1 300.02 diazepam

## 2018-02-12 ENCOUNTER — APPOINTMENT (OUTPATIENT)
Dept: GENERAL RADIOLOGY | Age: 58
End: 2018-02-12
Payer: MEDICARE

## 2018-02-12 ENCOUNTER — HOSPITAL ENCOUNTER (EMERGENCY)
Age: 58
Discharge: HOME | End: 2018-02-12
Payer: MEDICARE

## 2018-02-12 VITALS
BODY MASS INDEX: 13.97 KG/M2 | HEIGHT: 67 IN | HEART RATE: 72 BPM | WEIGHT: 89 LBS | RESPIRATION RATE: 17 BRPM | SYSTOLIC BLOOD PRESSURE: 126 MMHG | DIASTOLIC BLOOD PRESSURE: 71 MMHG | TEMPERATURE: 98 F | OXYGEN SATURATION: 99 %

## 2018-02-12 DIAGNOSIS — S52.531A CLOSED COLLES' FRACTURE OF RIGHT RADIUS, INITIAL ENCOUNTER: Primary | ICD-10-CM

## 2018-02-12 PROCEDURE — 73110 X-RAY EXAM OF WRIST: CPT

## 2018-02-12 PROCEDURE — 25600 CLTX DST RDL FX/EPHYS SEP WO: CPT | Performed by: NURSE PRACTITIONER

## 2018-02-12 PROCEDURE — 99283 EMERGENCY DEPT VISIT LOW MDM: CPT

## 2018-02-12 PROCEDURE — 99283 EMERGENCY DEPT VISIT LOW MDM: CPT | Performed by: NURSE PRACTITIONER

## 2018-02-12 PROCEDURE — 29125 APPL SHORT ARM SPLINT STATIC: CPT

## 2018-02-12 PROCEDURE — 73090 X-RAY EXAM OF FOREARM: CPT

## 2018-02-12 RX ORDER — MORPHINE SULFATE 15 MG/1
15 TABLET ORAL ONCE
Status: DISCONTINUED | OUTPATIENT
Start: 2018-02-12 | End: 2018-02-12 | Stop reason: HOSPADM

## 2018-02-12 RX ORDER — MORPHINE SULFATE 15 MG/1
15 TABLET ORAL EVERY 4 HOURS PRN
COMMUNITY
End: 2018-03-01

## 2018-02-12 RX ORDER — TRAZODONE HYDROCHLORIDE 100 MG/1
100 TABLET ORAL NIGHTLY
COMMUNITY
End: 2018-03-01 | Stop reason: SDUPTHER

## 2018-02-12 RX ORDER — TIZANIDINE HYDROCHLORIDE 6 MG/1
6 CAPSULE, GELATIN COATED ORAL 3 TIMES DAILY
COMMUNITY
End: 2018-03-01 | Stop reason: SDUPTHER

## 2018-02-12 ASSESSMENT — ENCOUNTER SYMPTOMS
ABDOMINAL DISTENTION: 0
ABDOMINAL PAIN: 0
CONSTIPATION: 0
DIARRHEA: 0
COUGH: 0
NAUSEA: 0
VOMITING: 0
WHEEZING: 0
BLOOD IN STOOL: 0
SHORTNESS OF BREATH: 0

## 2018-02-12 ASSESSMENT — PAIN SCALES - GENERAL: PAINLEVEL_OUTOF10: 0

## 2018-02-12 NOTE — ED NOTES
Bed: 13  Expected date:   Expected time:   Means of arrival:   Comments:  Gopal Beltre RN  02/12/18 [id number]

## 2018-02-13 NOTE — ED NOTES
When carlos arrived, PT states she did not want to wait on the morphine pill from pharmacy to come down. Pharmacy notified.       John Bran RN  02/12/18 1930

## 2018-03-01 ENCOUNTER — OFFICE VISIT (OUTPATIENT)
Dept: PRIMARY CARE CLINIC | Age: 58
End: 2018-03-01
Payer: MEDICARE

## 2018-03-01 VITALS
HEART RATE: 110 BPM | OXYGEN SATURATION: 98 % | BODY MASS INDEX: 14.28 KG/M2 | TEMPERATURE: 98.9 F | WEIGHT: 91 LBS | HEIGHT: 67 IN | DIASTOLIC BLOOD PRESSURE: 80 MMHG | SYSTOLIC BLOOD PRESSURE: 102 MMHG

## 2018-03-01 DIAGNOSIS — G47.00 INSOMNIA, UNSPECIFIED TYPE: ICD-10-CM

## 2018-03-01 DIAGNOSIS — G43.909 MIGRAINE WITHOUT STATUS MIGRAINOSUS, NOT INTRACTABLE, UNSPECIFIED MIGRAINE TYPE: ICD-10-CM

## 2018-03-01 DIAGNOSIS — J44.9 CHRONIC OBSTRUCTIVE PULMONARY DISEASE, UNSPECIFIED COPD TYPE (HCC): Primary | ICD-10-CM

## 2018-03-01 DIAGNOSIS — F32.A DEPRESSION, UNSPECIFIED DEPRESSION TYPE: ICD-10-CM

## 2018-03-01 DIAGNOSIS — K59.09 CHRONIC CONSTIPATION: ICD-10-CM

## 2018-03-01 DIAGNOSIS — F41.9 ANXIETY: ICD-10-CM

## 2018-03-01 DIAGNOSIS — E03.9 HYPOTHYROIDISM, UNSPECIFIED TYPE: ICD-10-CM

## 2018-03-01 DIAGNOSIS — R63.0 LACK OF APPETITE: ICD-10-CM

## 2018-03-01 PROCEDURE — 3023F SPIROM DOC REV: CPT | Performed by: NURSE PRACTITIONER

## 2018-03-01 PROCEDURE — G8926 SPIRO NO PERF OR DOC: HCPCS | Performed by: NURSE PRACTITIONER

## 2018-03-01 PROCEDURE — 3014F SCREEN MAMMO DOC REV: CPT | Performed by: NURSE PRACTITIONER

## 2018-03-01 PROCEDURE — 99214 OFFICE O/P EST MOD 30 MIN: CPT | Performed by: NURSE PRACTITIONER

## 2018-03-01 PROCEDURE — G8427 DOCREV CUR MEDS BY ELIG CLIN: HCPCS | Performed by: NURSE PRACTITIONER

## 2018-03-01 PROCEDURE — G8484 FLU IMMUNIZE NO ADMIN: HCPCS | Performed by: NURSE PRACTITIONER

## 2018-03-01 PROCEDURE — 3017F COLORECTAL CA SCREEN DOC REV: CPT | Performed by: NURSE PRACTITIONER

## 2018-03-01 PROCEDURE — G8419 CALC BMI OUT NRM PARAM NOF/U: HCPCS | Performed by: NURSE PRACTITIONER

## 2018-03-01 PROCEDURE — 4004F PT TOBACCO SCREEN RCVD TLK: CPT | Performed by: NURSE PRACTITIONER

## 2018-03-01 RX ORDER — FLUOXETINE HYDROCHLORIDE 40 MG/1
40 CAPSULE ORAL DAILY
Qty: 90 CAPSULE | Refills: 1 | Status: SHIPPED | OUTPATIENT
Start: 2018-03-01

## 2018-03-01 RX ORDER — PANTOPRAZOLE SODIUM 40 MG/1
40 TABLET, DELAYED RELEASE ORAL 2 TIMES DAILY
Qty: 180 TABLET | Refills: 1 | Status: SHIPPED | OUTPATIENT
Start: 2018-03-01 | End: 2018-04-10 | Stop reason: SDUPTHER

## 2018-03-01 RX ORDER — MEGESTROL ACETATE 40 MG/ML
400 SUSPENSION ORAL DAILY
Qty: 300 ML | Refills: 0 | Status: SHIPPED | OUTPATIENT
Start: 2018-03-01 | End: 2018-04-10

## 2018-03-01 RX ORDER — DIAZEPAM 10 MG/1
10 TABLET ORAL EVERY 6 HOURS PRN
COMMUNITY
End: 2018-03-01 | Stop reason: SDUPTHER

## 2018-03-01 RX ORDER — TRAZODONE HYDROCHLORIDE 100 MG/1
100 TABLET ORAL NIGHTLY
Qty: 30 TABLET | Refills: 5 | Status: SHIPPED | OUTPATIENT
Start: 2018-03-01

## 2018-03-01 RX ORDER — LEVOTHYROXINE SODIUM 0.05 MG/1
50 TABLET ORAL DAILY
Qty: 90 TABLET | Refills: 1 | Status: SHIPPED | OUTPATIENT
Start: 2018-03-01

## 2018-03-01 RX ORDER — RIZATRIPTAN BENZOATE 5 MG/1
5 TABLET ORAL
Qty: 30 TABLET | Refills: 3 | Status: SHIPPED | OUTPATIENT
Start: 2018-03-01 | End: 2018-04-10 | Stop reason: SDUPTHER

## 2018-03-01 RX ORDER — DIAZEPAM 10 MG/1
10 TABLET ORAL EVERY 8 HOURS PRN
Qty: 90 TABLET | Refills: 0 | Status: SHIPPED | OUTPATIENT
Start: 2018-03-01 | End: 2018-04-10 | Stop reason: SDUPTHER

## 2018-03-01 RX ORDER — TIZANIDINE HYDROCHLORIDE 6 MG/1
6 CAPSULE, GELATIN COATED ORAL 3 TIMES DAILY
Qty: 90 CAPSULE | Refills: 5 | Status: SHIPPED | OUTPATIENT
Start: 2018-03-01

## 2018-03-01 RX ORDER — ALBUTEROL SULFATE 90 UG/1
2 AEROSOL, METERED RESPIRATORY (INHALATION) EVERY 6 HOURS PRN
Qty: 3 INHALER | Refills: 1 | Status: SHIPPED | OUTPATIENT
Start: 2018-03-01 | End: 2018-05-08 | Stop reason: SDUPTHER

## 2018-03-01 NOTE — PROGRESS NOTES
Radha [address]  [place], [address] [place] [address] Rd  Phone [phone]   Fax [phone]      OFFICE VISIT: 3/1/2018    Goldie Jenkins is a 63 y.o. female who presents today for her medical conditions/complaints as noted below. Goldie Jenkins is c/o of Anxiety (wants to increase Wellbutrin and says that she is now taking Prozac 40mg and not the 20mg); Depression; and Headache (does not want Imatrex says that it making her sick)        HPI:     HPI   Anxiety (wants to increase Wellbutrin and says that she is now taking Prozac 40mg and not the 20mg); Depression; Headache (does not want Imatrex says that it making her sick)    Copd is stable. She does continue to smoke    Weight loss  I have scheduled her ct lung screen, GI referral for colonoscopy, mammogram several times and discussed that she needs to reschedule these appt multiple times. Past Medical History:   Diagnosis Date    Allergic rhinitis     Anxiety     Asthma     B12 deficiency     Depression     Gastroparesis     Headache     Hypothyroid     Seizures (HCC)       Past Surgical History:   Procedure Laterality Date    CHOLECYSTECTOMY      COLON SURGERY      GASTRIC STIMULATOR IMPLANT SURGERY      HERNIA REPAIR      TUBAL LIGATION         No family history on file.     Social History   Substance Use Topics    Smoking status: Current Every Day Smoker     Types: Cigarettes    Smokeless tobacco: Never Used    Alcohol use No      Current Outpatient Prescriptions   Medication Sig Dispense Refill    tiotropium (SPIRIVA) 18 MCG inhalation capsule Inhale 1 capsule into the lungs daily 90 capsule 1    linaclotide (LINZESS) 290 MCG CAPS capsule Take 1 capsule by mouth every morning (before breakfast) 90 capsule 1    levothyroxine (SYNTHROID) 50 MCG tablet Take 1 tablet by mouth Daily 90 tablet 1    FLUoxetine (PROZAC) 40 MG capsule Take 1 capsule by mouth daily 90 capsule 1    albuterol sulfate  (90 Base) MCG/ACT inhaler Inhale 2 puffs into the lungs every 6 hours as needed for Wheezing 3 Inhaler 1    traZODone (DESYREL) 100 MG tablet Take 1 tablet by mouth nightly 30 tablet 5    topiramate (TOPAMAX) 200 MG tablet Take 1 tablet by mouth 2 times daily 60 tablet 5    tiZANidine (ZANAFLEX) 6 MG capsule Take 1 capsule by mouth 3 times daily 90 capsule 5    buPROPion (WELLBUTRIN XL) 300 MG extended release tablet Take 1 tablet by mouth every morning 90 tablet 5    cycloSPORINE (RESTASIS) 0.05 % ophthalmic emulsion Place 1 drop into both eyes 2 times daily      pantoprazole (PROTONIX) 40 MG tablet Take 1 tablet by mouth 2 times daily 180 tablet 1    rizatriptan (MAXALT) 5 MG tablet Take 1 tablet by mouth once as needed for Migraine May repeat in 2 hours if needed 30 tablet 3    diazepam (VALIUM) 10 MG tablet Take 1 tablet by mouth every 8 hours as needed for Anxiety for up to 30 days. 90 tablet 0     No current facility-administered medications for this visit. Allergies   Allergen Reactions    Codeine     Pcn [Penicillins]      Does not know reaction      Percocet [Oxycodone-Acetaminophen]      Does agree with her system    Prednisone Other (See Comments)     Hyper, headache, anger    Sulfa Antibiotics Other (See Comments)     Headache       Health Maintenance   Topic Date Due    DTaP/Tdap/Td vaccine (1 - Tdap) 01/12/1980    Cervical cancer screen  01/12/1982    Lipid screen  01/12/2001    Breast cancer screen  01/12/2011    Shingles Vaccine (1 of 2 - 2 Dose Series) 01/12/2011    Colon cancer screen colonoscopy  01/12/2011    TSH testing  11/22/2018    Flu vaccine  Completed    Pneumococcal med risk  Completed    Hepatitis C screen  Completed    HIV screen  Completed        Subjective:      Review of Systems   Constitutional: Negative for appetite change and unexpected weight change. HENT: Negative for congestion, rhinorrhea, sore throat and trouble swallowing. Eyes: Negative for discharge and redness.    Respiratory: Refill:  1    levothyroxine (SYNTHROID) 50 MCG tablet     Sig: Take 1 tablet by mouth Daily     Dispense:  90 tablet     Refill:  1    FLUoxetine (PROZAC) 40 MG capsule     Sig: Take 1 capsule by mouth daily     Dispense:  90 capsule     Refill:  1    albuterol sulfate  (90 Base) MCG/ACT inhaler     Sig: Inhale 2 puffs into the lungs every 6 hours as needed for Wheezing     Dispense:  3 Inhaler     Refill:  1    traZODone (DESYREL) 100 MG tablet     Sig: Take 1 tablet by mouth nightly     Dispense:  30 tablet     Refill:  5    topiramate (TOPAMAX) 200 MG tablet     Sig: Take 1 tablet by mouth 2 times daily     Dispense:  60 tablet     Refill:  5    tiZANidine (ZANAFLEX) 6 MG capsule     Sig: Take 1 capsule by mouth 3 times daily     Dispense:  90 capsule     Refill:  5    DISCONTD: diazepam (VALIUM) 10 MG tablet     Sig: Take 1 tablet by mouth every 8 hours as needed for Anxiety for up to 30 days. Dispense:  90 tablet     Refill:  0    DISCONTD: megestrol (MEGACE ORAL) 40 MG/ML suspension     Sig: Take 10 mLs by mouth daily     Dispense:  300 mL     Refill:  0         Discussed use, benefit, and side effects of prescribed medications. All patient questions answered. Pt voiced understanding. Reviewed health maintenance. .  Patient agreed with treatment plan. Follow up as directed. There are no Patient Instructions on file for this visit.       Electronically signed by PARVEEN Kim on 4/10/2018 at 10:03 AM

## 2018-03-01 NOTE — LETTER
Zaria Schumacher  1960      To Whom it May Concern,       Mrs. Ajay Nunez is a patient of mine. She was seen in my office today 3/1/18. She is prescribed Diazepam for General Anxiety Disorder. If you have any questions please feel free to contact me at [phone]. Thank you,      MELANI Henao

## 2018-04-10 ENCOUNTER — OFFICE VISIT (OUTPATIENT)
Dept: PRIMARY CARE CLINIC | Age: 58
End: 2018-04-10
Payer: MEDICARE

## 2018-04-10 VITALS
WEIGHT: 102 LBS | TEMPERATURE: 97.3 F | BODY MASS INDEX: 16.01 KG/M2 | DIASTOLIC BLOOD PRESSURE: 70 MMHG | HEART RATE: 103 BPM | HEIGHT: 67 IN | OXYGEN SATURATION: 96 % | SYSTOLIC BLOOD PRESSURE: 102 MMHG

## 2018-04-10 DIAGNOSIS — F33.1 MODERATE EPISODE OF RECURRENT MAJOR DEPRESSIVE DISORDER (HCC): Primary | ICD-10-CM

## 2018-04-10 DIAGNOSIS — G40.909 SEIZURE DISORDER (HCC): ICD-10-CM

## 2018-04-10 DIAGNOSIS — G43.909 MIGRAINE WITHOUT STATUS MIGRAINOSUS, NOT INTRACTABLE, UNSPECIFIED MIGRAINE TYPE: ICD-10-CM

## 2018-04-10 DIAGNOSIS — R63.4 WEIGHT LOSS: ICD-10-CM

## 2018-04-10 DIAGNOSIS — F41.9 ANXIETY: ICD-10-CM

## 2018-04-10 PROBLEM — F32.A DEPRESSION: Status: RESOLVED | Noted: 2017-08-24 | Resolved: 2018-04-10

## 2018-04-10 PROCEDURE — G8427 DOCREV CUR MEDS BY ELIG CLIN: HCPCS | Performed by: NURSE PRACTITIONER

## 2018-04-10 PROCEDURE — 3017F COLORECTAL CA SCREEN DOC REV: CPT | Performed by: NURSE PRACTITIONER

## 2018-04-10 PROCEDURE — G8419 CALC BMI OUT NRM PARAM NOF/U: HCPCS | Performed by: NURSE PRACTITIONER

## 2018-04-10 PROCEDURE — 4004F PT TOBACCO SCREEN RCVD TLK: CPT | Performed by: NURSE PRACTITIONER

## 2018-04-10 PROCEDURE — 3014F SCREEN MAMMO DOC REV: CPT | Performed by: NURSE PRACTITIONER

## 2018-04-10 PROCEDURE — 99214 OFFICE O/P EST MOD 30 MIN: CPT | Performed by: NURSE PRACTITIONER

## 2018-04-10 RX ORDER — PANTOPRAZOLE SODIUM 40 MG/1
40 TABLET, DELAYED RELEASE ORAL 2 TIMES DAILY
Qty: 180 TABLET | Refills: 1 | Status: SHIPPED | OUTPATIENT
Start: 2018-04-10

## 2018-04-10 RX ORDER — RIZATRIPTAN BENZOATE 5 MG/1
5 TABLET ORAL
Qty: 30 TABLET | Refills: 3 | Status: SHIPPED | OUTPATIENT
Start: 2018-04-10 | End: 2018-06-05

## 2018-04-10 RX ORDER — DIAZEPAM 10 MG/1
10 TABLET ORAL EVERY 8 HOURS PRN
Qty: 90 TABLET | Refills: 0 | Status: SHIPPED | OUTPATIENT
Start: 2018-04-10 | End: 2018-05-08

## 2018-04-10 ASSESSMENT — ENCOUNTER SYMPTOMS
BLOOD IN STOOL: 0
DIARRHEA: 0
CONSTIPATION: 0
EYE REDNESS: 0
ABDOMINAL PAIN: 0
NAUSEA: 1
EYE DISCHARGE: 0
ABDOMINAL PAIN: 0
BACK PAIN: 1
EYE REDNESS: 0
BLOOD IN STOOL: 0
COUGH: 0
BACK PAIN: 1
DIARRHEA: 0
RHINORRHEA: 0
TROUBLE SWALLOWING: 0
EYE DISCHARGE: 0
TROUBLE SWALLOWING: 0
COUGH: 0
WHEEZING: 0
RHINORRHEA: 0
WHEEZING: 0
SORE THROAT: 0
NAUSEA: 1
SORE THROAT: 0
CONSTIPATION: 0

## 2018-04-25 DIAGNOSIS — Z91.09 ENVIRONMENTAL ALLERGIES: ICD-10-CM

## 2018-04-26 RX ORDER — MONTELUKAST SODIUM 10 MG/1
10 TABLET ORAL NIGHTLY
Qty: 90 TABLET | Refills: 0 | OUTPATIENT
Start: 2018-04-26

## 2018-05-08 ENCOUNTER — CARE COORDINATION (OUTPATIENT)
Dept: CARE COORDINATION | Age: 58
End: 2018-05-08

## 2018-05-08 ENCOUNTER — OFFICE VISIT (OUTPATIENT)
Dept: PRIMARY CARE CLINIC | Age: 58
End: 2018-05-08
Payer: MEDICARE

## 2018-05-08 VITALS
WEIGHT: 103 LBS | SYSTOLIC BLOOD PRESSURE: 102 MMHG | BODY MASS INDEX: 16.17 KG/M2 | DIASTOLIC BLOOD PRESSURE: 70 MMHG | OXYGEN SATURATION: 98 % | HEIGHT: 67 IN | TEMPERATURE: 97.5 F | HEART RATE: 75 BPM

## 2018-05-08 DIAGNOSIS — L30.9 ECZEMA, UNSPECIFIED TYPE: ICD-10-CM

## 2018-05-08 DIAGNOSIS — F41.1 GAD (GENERALIZED ANXIETY DISORDER): ICD-10-CM

## 2018-05-08 DIAGNOSIS — K31.84 GASTROPARESIS: Primary | ICD-10-CM

## 2018-05-08 DIAGNOSIS — R11.0 CHRONIC NAUSEA: ICD-10-CM

## 2018-05-08 DIAGNOSIS — J44.9 CHRONIC OBSTRUCTIVE PULMONARY DISEASE, UNSPECIFIED COPD TYPE (HCC): ICD-10-CM

## 2018-05-08 PROCEDURE — G8926 SPIRO NO PERF OR DOC: HCPCS | Performed by: NURSE PRACTITIONER

## 2018-05-08 PROCEDURE — 4004F PT TOBACCO SCREEN RCVD TLK: CPT | Performed by: NURSE PRACTITIONER

## 2018-05-08 PROCEDURE — 99214 OFFICE O/P EST MOD 30 MIN: CPT | Performed by: NURSE PRACTITIONER

## 2018-05-08 PROCEDURE — G8427 DOCREV CUR MEDS BY ELIG CLIN: HCPCS | Performed by: NURSE PRACTITIONER

## 2018-05-08 PROCEDURE — 3017F COLORECTAL CA SCREEN DOC REV: CPT | Performed by: NURSE PRACTITIONER

## 2018-05-08 PROCEDURE — 3023F SPIROM DOC REV: CPT | Performed by: NURSE PRACTITIONER

## 2018-05-08 PROCEDURE — G8419 CALC BMI OUT NRM PARAM NOF/U: HCPCS | Performed by: NURSE PRACTITIONER

## 2018-05-08 RX ORDER — ONDANSETRON 8 MG/1
8 TABLET, ORALLY DISINTEGRATING ORAL EVERY 8 HOURS PRN
Qty: 30 TABLET | Refills: 5 | Status: SHIPPED | OUTPATIENT
Start: 2018-05-08

## 2018-05-08 RX ORDER — BETAMETHASONE DIPROPIONATE 0.05 %
OINTMENT (GRAM) TOPICAL
Qty: 50 G | Refills: 1 | Status: SHIPPED | OUTPATIENT
Start: 2018-05-08

## 2018-05-08 RX ORDER — CLONAZEPAM 2 MG/1
1-2 TABLET ORAL 2 TIMES DAILY PRN
Qty: 60 TABLET | Refills: 0 | Status: SHIPPED | OUTPATIENT
Start: 2018-05-08 | End: 2018-05-24 | Stop reason: SINTOL

## 2018-05-08 RX ORDER — ALBUTEROL SULFATE 90 UG/1
2 AEROSOL, METERED RESPIRATORY (INHALATION) EVERY 6 HOURS PRN
Qty: 3 INHALER | Refills: 1 | Status: SHIPPED | OUTPATIENT
Start: 2018-05-08

## 2018-05-08 ASSESSMENT — ENCOUNTER SYMPTOMS
SORE THROAT: 0
NAUSEA: 1
COUGH: 0
ABDOMINAL PAIN: 0
EYE REDNESS: 0
TROUBLE SWALLOWING: 0
EYE DISCHARGE: 0
RHINORRHEA: 0
DIARRHEA: 0
CONSTIPATION: 0
BLOOD IN STOOL: 0
BACK PAIN: 1
WHEEZING: 0

## 2018-05-10 NOTE — CARE COORDINATION
TELEPHONED THE PATIENT TO ASK ABOUT PERSONAL NEEDS. PER THE PATIENT, SHE STATED DR Carina Aguillon, [facility], SHE DOES NOT NEED SURGERY. DR Carina Aguillon SPOKE WITH THE PATIENT ABOUT GETTING A NEW GASTRIC STIMULATOR THAT WILL ALLOW HER TO HAVE AN MRI. PATIENT IS CURRENTLY DEALING WITH CHRONIC PAIN. PATIENT THINKS SHE NEEDS TO SEE A NEUROLOGIST REGARDING BACK PAIN. SHE DOES NOT WANT DR Carina Aguillon TO KNOW ABOUT THE REFERRAL TO A NEUROLOGIST. PATIENT HAD SOMEONE IN THE BACKGROUND SHE KEPT SPEAKING WITH. WAS ABLE TO FIND OUT THAT THE PATIENT GETS EXTRA HELP WITH PRESCRIPTIONS. SHE EARNS $485 FROM [employer] AND $346 FROM THE 'S PENSION. SHE MAKES TOO MUCH FOR [place] MEDICAID. DISCUSSED TRANSPORTATION. TOLD HER TRANSPORTATION ASSISTANCE FOR [facility] IS DIFFICULT TO FIND. SUGGESTED SHE CALL [employer] TO ASK IF THEY HAVE VOLUNTEERS. TOLD HER I WOULD SEND HER A LISTING OF [employer]. SENDING HER A [facility] QUICK REFERENCE GUIDE, FOOD BANK LISTING AND INFORMATION ABOUT AREA TRANSPORTATION SERVICES.       Submitted by Efra/WARREN

## 2018-05-15 ENCOUNTER — TELEPHONE (OUTPATIENT)
Dept: PRIMARY CARE CLINIC | Age: 58
End: 2018-05-15

## 2018-05-15 DIAGNOSIS — G40.909 SEIZURE DISORDER (HCC): ICD-10-CM

## 2018-05-15 DIAGNOSIS — G43.909 MIGRAINE WITHOUT STATUS MIGRAINOSUS, NOT INTRACTABLE, UNSPECIFIED MIGRAINE TYPE: Primary | ICD-10-CM

## 2018-05-16 ENCOUNTER — TELEPHONE (OUTPATIENT)
Dept: NEUROLOGY | Age: 58
End: 2018-05-16

## 2018-05-18 DIAGNOSIS — Z91.09 ENVIRONMENTAL ALLERGIES: ICD-10-CM

## 2018-05-18 RX ORDER — MONTELUKAST SODIUM 10 MG/1
10 TABLET ORAL NIGHTLY
Qty: 90 TABLET | Refills: 0 | OUTPATIENT
Start: 2018-05-18

## 2018-05-22 DIAGNOSIS — D50.9 IRON DEFICIENCY ANEMIA, UNSPECIFIED IRON DEFICIENCY ANEMIA TYPE: Primary | ICD-10-CM

## 2018-05-22 RX ORDER — LANOLIN ALCOHOL/MO/W.PET/CERES
325 CREAM (GRAM) TOPICAL 2 TIMES DAILY
Qty: 90 TABLET | Refills: 3 | Status: SHIPPED | OUTPATIENT
Start: 2018-05-22 | End: 2018-06-05

## 2018-05-24 RX ORDER — DIAZEPAM 5 MG/1
5 TABLET ORAL EVERY 8 HOURS PRN
Qty: 45 TABLET | Refills: 0 | Status: SHIPPED | OUTPATIENT
Start: 2018-05-24 | End: 2018-06-03

## 2018-06-05 ENCOUNTER — OFFICE VISIT (OUTPATIENT)
Dept: PRIMARY CARE CLINIC | Age: 58
End: 2018-06-05
Payer: MEDICARE

## 2018-06-05 VITALS
TEMPERATURE: 97.7 F | OXYGEN SATURATION: 97 % | SYSTOLIC BLOOD PRESSURE: 90 MMHG | HEART RATE: 70 BPM | BODY MASS INDEX: 15.11 KG/M2 | DIASTOLIC BLOOD PRESSURE: 60 MMHG | HEIGHT: 66 IN | WEIGHT: 94 LBS

## 2018-06-05 DIAGNOSIS — J34.89 SINUS PAIN: ICD-10-CM

## 2018-06-05 DIAGNOSIS — R09.81 SINUS CONGESTION: ICD-10-CM

## 2018-06-05 DIAGNOSIS — R19.7 DIARRHEA, UNSPECIFIED TYPE: ICD-10-CM

## 2018-06-05 DIAGNOSIS — F41.9 ANXIETY: Primary | ICD-10-CM

## 2018-06-05 DIAGNOSIS — G43.909 MIGRAINE WITHOUT STATUS MIGRAINOSUS, NOT INTRACTABLE, UNSPECIFIED MIGRAINE TYPE: ICD-10-CM
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
## 2018-06-05 DIAGNOSIS — Z91.09 ENVIRONMENTAL ALLERGIES: ICD-10-CM

## 2018-06-05 DIAGNOSIS — F33.1 MODERATE EPISODE OF RECURRENT MAJOR DEPRESSIVE DISORDER (HCC): ICD-10-CM

## 2018-06-05 PROCEDURE — 99214 OFFICE O/P EST MOD 30 MIN: CPT | Performed by: NURSE PRACTITIONER

## 2018-06-05 PROCEDURE — G8419 CALC BMI OUT NRM PARAM NOF/U: HCPCS | Performed by: NURSE PRACTITIONER

## 2018-06-05 PROCEDURE — G8428 CUR MEDS NOT DOCUMENT: HCPCS | Performed by: NURSE PRACTITIONER

## 2018-06-05 PROCEDURE — 3017F COLORECTAL CA SCREEN DOC REV: CPT | Performed by: NURSE PRACTITIONER

## 2018-06-05 PROCEDURE — 4004F PT TOBACCO SCREEN RCVD TLK: CPT | Performed by: NURSE PRACTITIONER

## 2018-06-05 RX ORDER — CEPHALEXIN 500 MG/1
500 CAPSULE ORAL 3 TIMES DAILY
Qty: 30 CAPSULE | Refills: 0 | Status: SHIPPED | OUTPATIENT
Start: 2018-06-05 | End: 2018-06-15

## 2018-06-05 RX ORDER — ARIPIPRAZOLE 5 MG/1
5 TABLET ORAL DAILY
Qty: 30 TABLET | Refills: 11 | Status: SHIPPED | OUTPATIENT
Start: 2018-06-05 | End: 2018-06-28 | Stop reason: DRUGHIGH

## 2018-06-05 RX ORDER — DIAZEPAM 5 MG/1
5 TABLET ORAL EVERY 8 HOURS PRN
Qty: 45 TABLET | Refills: 0 | Status: CANCELLED | OUTPATIENT
Start: 2018-06-05 | End: 2018-06-15

## 2018-06-05 RX ORDER — MONTELUKAST SODIUM 10 MG/1
10 TABLET ORAL NIGHTLY
Qty: 90 TABLET | Refills: 3 | Status: SHIPPED | OUTPATIENT
Start: 2018-06-05 | End: 2018-09-06 | Stop reason: SDUPTHER

## 2018-06-05 RX ORDER — DIAZEPAM 10 MG/1
10 TABLET ORAL EVERY 8 HOURS PRN
Qty: 90 TABLET | Refills: 0 | Status: SHIPPED | OUTPATIENT
Start: 2018-06-05 | End: 2018-06-28 | Stop reason: SDUPTHER

## 2018-06-05 ASSESSMENT — ENCOUNTER SYMPTOMS
RESPIRATORY NEGATIVE: 1
BLOOD IN STOOL: 1
ABDOMINAL PAIN: 1
EYES NEGATIVE: 1
DIARRHEA: 1

## 2018-06-25 ENCOUNTER — TELEPHONE (OUTPATIENT)
Dept: PRIMARY CARE CLINIC | Age: 58
End: 2018-06-25

## 2018-06-28 ENCOUNTER — OFFICE VISIT (OUTPATIENT)
Dept: PRIMARY CARE CLINIC | Age: 58
End: 2018-06-28
Payer: MEDICARE

## 2018-06-28 VITALS
WEIGHT: 102 LBS | HEIGHT: 67 IN | HEART RATE: 98 BPM | OXYGEN SATURATION: 94 % | BODY MASS INDEX: 16.01 KG/M2 | DIASTOLIC BLOOD PRESSURE: 70 MMHG | TEMPERATURE: 97.8 F | SYSTOLIC BLOOD PRESSURE: 102 MMHG

## 2018-06-28 DIAGNOSIS — F33.1 MODERATE EPISODE OF RECURRENT MAJOR DEPRESSIVE DISORDER (HCC): ICD-10-CM

## 2018-06-28 DIAGNOSIS — R19.8 CHANGE IN BOWEL MOVEMENT: ICD-10-CM

## 2018-06-28 DIAGNOSIS — F41.9 ANXIETY: ICD-10-CM

## 2018-06-28 DIAGNOSIS — R19.5 DARK STOOLS: Primary | ICD-10-CM

## 2018-06-28 DIAGNOSIS — E53.8 B12 DEFICIENCY: ICD-10-CM

## 2018-06-28 PROCEDURE — 99214 OFFICE O/P EST MOD 30 MIN: CPT | Performed by: NURSE PRACTITIONER

## 2018-06-28 PROCEDURE — 3017F COLORECTAL CA SCREEN DOC REV: CPT | Performed by: NURSE PRACTITIONER

## 2018-06-28 PROCEDURE — G8427 DOCREV CUR MEDS BY ELIG CLIN: HCPCS | Performed by: NURSE PRACTITIONER

## 2018-06-28 PROCEDURE — 4004F PT TOBACCO SCREEN RCVD TLK: CPT | Performed by: NURSE PRACTITIONER

## 2018-06-28 PROCEDURE — G8419 CALC BMI OUT NRM PARAM NOF/U: HCPCS | Performed by: NURSE PRACTITIONER

## 2018-06-28 PROCEDURE — 96372 THER/PROPH/DIAG INJ SC/IM: CPT | Performed by: NURSE PRACTITIONER

## 2018-06-28 RX ORDER — DIAZEPAM 10 MG/1
10 TABLET ORAL EVERY 8 HOURS PRN
Qty: 90 TABLET | Refills: 0 | Status: SHIPPED | OUTPATIENT
Start: 2018-06-28 | End: 2018-07-27 | Stop reason: SDUPTHER

## 2018-06-28 RX ORDER — CYANOCOBALAMIN 1000 UG/ML
1000 INJECTION INTRAMUSCULAR; SUBCUTANEOUS
Qty: 1 ML | Refills: 11 | Status: SHIPPED | OUTPATIENT
Start: 2018-06-28

## 2018-06-28 RX ORDER — ARIPIPRAZOLE 10 MG/1
10 TABLET ORAL DAILY
Qty: 30 TABLET | Refills: 3 | Status: SHIPPED | OUTPATIENT
Start: 2018-06-28 | End: 2018-07-27

## 2018-06-28 RX ORDER — CYANOCOBALAMIN 1000 UG/ML
1000 INJECTION INTRAMUSCULAR; SUBCUTANEOUS ONCE
Status: COMPLETED | OUTPATIENT
Start: 2018-06-28 | End: 2018-06-28

## 2018-06-28 RX ADMIN — CYANOCOBALAMIN 1000 MCG: 1000 INJECTION INTRAMUSCULAR; SUBCUTANEOUS at 14:29

## 2018-06-28 ASSESSMENT — ENCOUNTER SYMPTOMS
RESPIRATORY NEGATIVE: 1
ABDOMINAL PAIN: 1
DIARRHEA: 1
EYES NEGATIVE: 1
BLOOD IN STOOL: 1

## 2018-07-02 ENCOUNTER — OFFICE VISIT (OUTPATIENT)
Dept: OBGYN | Age: 58
End: 2018-07-02
Payer: MEDICARE

## 2018-07-02 VITALS
BODY MASS INDEX: 15.7 KG/M2 | SYSTOLIC BLOOD PRESSURE: 98 MMHG | HEIGHT: 67 IN | WEIGHT: 100 LBS | DIASTOLIC BLOOD PRESSURE: 62 MMHG

## 2018-07-02 DIAGNOSIS — R10.2 PELVIC PAIN IN FEMALE: Primary | ICD-10-CM

## 2018-07-02 DIAGNOSIS — N89.8 VAGINAL DISCHARGE: ICD-10-CM

## 2018-07-02 DIAGNOSIS — R14.0 BLOATING: ICD-10-CM

## 2018-07-02 DIAGNOSIS — Z12.4 SCREENING FOR CERVICAL CANCER: ICD-10-CM

## 2018-07-02 DIAGNOSIS — Z11.51 SCREENING FOR HPV (HUMAN PAPILLOMAVIRUS): ICD-10-CM

## 2018-07-02 PROCEDURE — G0101 CA SCREEN;PELVIC/BREAST EXAM: HCPCS | Performed by: NURSE PRACTITIONER

## 2018-07-02 ASSESSMENT — ENCOUNTER SYMPTOMS
RESPIRATORY NEGATIVE: 1
EYES NEGATIVE: 1
GASTROINTESTINAL NEGATIVE: 1

## 2018-07-02 NOTE — PROGRESS NOTES
Viola Cardenas is a 63 y.o. female who presents today for her medical conditions/ complaints as noted below. Viola Cardenas is c/o of Establish Care and Gynecologic Exam        HPI  Pt is here for breast exam and pap smear. Pt complains of tingling & sharp pains in pelvis since last Fall. She has discharge with odor, described as beige x two weeks. Last mammogram:  Has one scheduled for this month. Last pap smear:  3 years  Contraception:  postmeno  :  3  Para:  1  AB:  2  Last bone density:  na  Last colonoscopy:  Is on a schedule. Patient's last menstrual period was 2008. No obstetric history on file. Past Medical History:   Diagnosis Date    Allergic rhinitis     Anemia     Anxiety     B12 deficiency     COPD (chronic obstructive pulmonary disease) (HCC)     Depression     Gastroparesis     Headache     Hypothyroid     Seizures (HCC)      Past Surgical History:   Procedure Laterality Date    CHOLECYSTECTOMY      COLON SURGERY      GASTRIC STIMULATOR IMPLANT SURGERY      HERNIA REPAIR      TUBAL LIGATION       Family History   Problem Relation Age of Onset    Diabetes Father     Heart Attack Father     Cancer Mother         ovarian, over 48    Heart Attack Brother     Cancer Maternal Aunt 40        ovarian     Social History   Substance Use Topics    Smoking status: Current Every Day Smoker     Packs/day: 1.00     Years: 30.00     Types: Cigarettes    Smokeless tobacco: Never Used      Comment: is cutting back    Alcohol use No       Current Outpatient Prescriptions   Medication Sig Dispense Refill    diazepam (VALIUM) 10 MG tablet Take 1 tablet by mouth every 8 hours as needed for Anxiety for up to 10 days. . 90 tablet 0    ARIPiprazole (ABILIFY) 10 MG tablet Take 1 tablet by mouth daily 30 tablet 3    cyanocobalamin 1000 MCG/ML injection Inject 1 mL into the muscle every 30 days 1 mL 11    Needles & Syringes MISC 1 each by Does not apply route every 30 days 12 each 1    montelukast (SINGULAIR) 10 MG tablet Take 1 tablet by mouth nightly 90 tablet 3    albuterol sulfate  (90 Base) MCG/ACT inhaler Inhale 2 puffs into the lungs every 6 hours as needed for Wheezing 3 Inhaler 1    ondansetron (ZOFRAN ODT) 8 MG disintegrating tablet Take 1 tablet by mouth every 8 hours as needed for Nausea or Vomiting 30 tablet 5    betamethasone dipropionate (DIPROLENE) 0.05 % ointment Apply topically 2 times daily. 50 g 1    pantoprazole (PROTONIX) 40 MG tablet Take 1 tablet by mouth 2 times daily 180 tablet 1    tiotropium (SPIRIVA) 18 MCG inhalation capsule Inhale 1 capsule into the lungs daily 90 capsule 1    linaclotide (LINZESS) 290 MCG CAPS capsule Take 1 capsule by mouth every morning (before breakfast) 90 capsule 1    levothyroxine (SYNTHROID) 50 MCG tablet Take 1 tablet by mouth Daily 90 tablet 1    FLUoxetine (PROZAC) 40 MG capsule Take 1 capsule by mouth daily 90 capsule 1    traZODone (DESYREL) 100 MG tablet Take 1 tablet by mouth nightly 30 tablet 5    topiramate (TOPAMAX) 200 MG tablet Take 1 tablet by mouth 2 times daily 60 tablet 5    tiZANidine (ZANAFLEX) 6 MG capsule Take 1 capsule by mouth 3 times daily 90 capsule 5    buPROPion (WELLBUTRIN XL) 300 MG extended release tablet Take 1 tablet by mouth every morning 90 tablet 5    cycloSPORINE (RESTASIS) 0.05 % ophthalmic emulsion Place 1 drop into both eyes 2 times daily       No current facility-administered medications for this visit. Allergies   Allergen Reactions    Codeine     Pcn [Penicillins]      Does not know reaction      Percocet [Oxycodone-Acetaminophen]      Does agree with her system    Prednisone Other (See Comments)     Hyper, headache, anger    Sulfa Antibiotics Other (See Comments)     Headache     Vitals:    07/02/18 1322   BP: 98/62     Body mass index is 15.66 kg/m². Review of Systems   Constitutional: Negative. HENT: Negative. Eyes: Negative. malodorous discharge noted. Musculoskeletal: Normal range of motion. Neurological: She is alert and oriented to person, place, and time. Skin: Skin is warm and dry. Psychiatric: She has a normal mood and affect. Her behavior is normal.   Nursing note and vitals reviewed. Diagnosis Orders   1. Pelvic pain in female  [address]   2. Vaginal discharge     3. Screening for cervical cancer  PAP SMEAR   4. Screening for HPV (human papillomavirus)  PAP SMEAR    Human papillomavirus (HPV) DNA Probe Thin Prep High Risk   5. Bloating  US PELVIS COMPLETE       MEDICATIONS:  No orders of the defined types were placed in this encounter. ORDERS:  Orders Placed This Encounter   Procedures    US PELVIS COMPLETE    PAP SMEAR    Human papillomavirus (HPV) DNA Probe Thin Prep High Risk       PLAN:  Pap collected  US being scheduled for her complaints. There are no Patient Instructions on file for this visit.

## 2018-07-09 ENCOUNTER — HOSPITAL ENCOUNTER (OUTPATIENT)
Dept: WOMENS IMAGING | Age: 58
Discharge: HOME | End: 2018-07-09
Payer: MEDICARE

## 2018-07-09 ENCOUNTER — HOSPITAL ENCOUNTER (OUTPATIENT)
Dept: ULTRASOUND IMAGING | Age: 58
Discharge: HOME | End: 2018-07-09
Payer: MEDICARE

## 2018-07-09 DIAGNOSIS — Z12.31 SCREENING MAMMOGRAM, ENCOUNTER FOR: ICD-10-CM

## 2018-07-09 DIAGNOSIS — R14.0 BLOATING: ICD-10-CM

## 2018-07-09 DIAGNOSIS — R10.2 PELVIC PAIN IN FEMALE: ICD-10-CM

## 2018-07-09 LAB
HPV TYPE 16: NOT DETECTED
HPV TYPE 18: NOT DETECTED
INTERPRETATION: NORMAL
OTHER HIGH RISK HPV: NOT DETECTED
SOURCE: NORMAL

## 2018-07-09 PROCEDURE — 76856 US EXAM PELVIC COMPLETE: CPT

## 2018-07-09 PROCEDURE — 77063 BREAST TOMOSYNTHESIS BI: CPT

## 2018-07-10 ENCOUNTER — TELEPHONE (OUTPATIENT)
Dept: PRIMARY CARE CLINIC | Age: 58
End: 2018-07-10

## 2018-07-12 ENCOUNTER — TELEPHONE (OUTPATIENT)
Dept: OBGYN | Age: 58
End: 2018-07-12

## 2018-07-27 ENCOUNTER — OFFICE VISIT (OUTPATIENT)
Dept: PRIMARY CARE CLINIC | Age: 58
End: 2018-07-27
Payer: MEDICARE

## 2018-07-27 ENCOUNTER — TELEPHONE (OUTPATIENT)
Dept: PRIMARY CARE CLINIC | Age: 58
End: 2018-07-27

## 2018-07-27 VITALS
HEIGHT: 67 IN | HEART RATE: 84 BPM | SYSTOLIC BLOOD PRESSURE: 102 MMHG | BODY MASS INDEX: 17.27 KG/M2 | TEMPERATURE: 99.4 F | OXYGEN SATURATION: 99 % | WEIGHT: 110 LBS | DIASTOLIC BLOOD PRESSURE: 70 MMHG

## 2018-07-27 DIAGNOSIS — F41.9 ANXIETY: ICD-10-CM

## 2018-07-27 DIAGNOSIS — G43.909 MIGRAINE WITHOUT STATUS MIGRAINOSUS, NOT INTRACTABLE, UNSPECIFIED MIGRAINE TYPE: ICD-10-CM

## 2018-07-27 DIAGNOSIS — E03.9 HYPOTHYROIDISM, UNSPECIFIED TYPE: ICD-10-CM

## 2018-07-27 DIAGNOSIS — F33.1 MODERATE EPISODE OF RECURRENT MAJOR DEPRESSIVE DISORDER (HCC): ICD-10-CM

## 2018-07-27 DIAGNOSIS — F33.1 MODERATE EPISODE OF RECURRENT MAJOR DEPRESSIVE DISORDER (HCC): Primary | ICD-10-CM

## 2018-07-27 LAB
ALBUMIN SERPL-MCNC: 4.1 G/DL (ref 3.5–5.2)
ALP BLD-CCNC: 67 U/L (ref 35–104)
ALT SERPL-CCNC: 17 U/L (ref 5–33)
ANION GAP SERPL CALCULATED.3IONS-SCNC: 15 MMOL/L (ref 7–19)
AST SERPL-CCNC: 24 U/L (ref 5–32)
BILIRUB SERPL-MCNC: <0.2 MG/DL (ref 0.2–1.2)
BUN BLDV-MCNC: 17 MG/DL (ref 6–20)
CALCIUM SERPL-MCNC: 8.9 MG/DL (ref 8.6–10)
CHLORIDE BLD-SCNC: 100 MMOL/L (ref 98–111)
CO2: 25 MMOL/L (ref 22–29)
CREAT SERPL-MCNC: 0.9 MG/DL (ref 0.5–0.9)
GFR NON-AFRICAN AMERICAN: >60
GLUCOSE BLD-MCNC: 95 MG/DL (ref 74–109)
HCT VFR BLD CALC: 31.5 % (ref 37–47)
HEMOGLOBIN: 9.9 G/DL (ref 12–16)
MCH RBC QN AUTO: 30.7 PG (ref 27–31)
MCHC RBC AUTO-ENTMCNC: 31.4 G/DL (ref 33–37)
MCV RBC AUTO: 97.5 FL (ref 81–99)
PDW BLD-RTO: 12.3 % (ref 11.5–14.5)
PLATELET # BLD: 234 K/UL (ref 130–400)
PMV BLD AUTO: 10.1 FL (ref 9.4–12.3)
POTASSIUM SERPL-SCNC: 3.9 MMOL/L (ref 3.5–5)
RBC # BLD: 3.23 M/UL (ref 4.2–5.4)
SODIUM BLD-SCNC: 140 MMOL/L (ref 136–145)
T4 FREE: 1 NG/DL (ref 0.9–1.7)
TOTAL PROTEIN: 6.8 G/DL (ref 6.6–8.7)
TSH SERPL DL<=0.05 MIU/L-ACNC: 1.25 UIU/ML (ref 0.27–4.2)
WBC # BLD: 6.8 K/UL (ref 4.8–10.8)

## 2018-07-27 PROCEDURE — 99214 OFFICE O/P EST MOD 30 MIN: CPT | Performed by: NURSE PRACTITIONER

## 2018-07-27 PROCEDURE — G8419 CALC BMI OUT NRM PARAM NOF/U: HCPCS | Performed by: NURSE PRACTITIONER

## 2018-07-27 PROCEDURE — 4004F PT TOBACCO SCREEN RCVD TLK: CPT | Performed by: NURSE PRACTITIONER

## 2018-07-27 PROCEDURE — 3017F COLORECTAL CA SCREEN DOC REV: CPT | Performed by: NURSE PRACTITIONER

## 2018-07-27 PROCEDURE — G8427 DOCREV CUR MEDS BY ELIG CLIN: HCPCS | Performed by: NURSE PRACTITIONER

## 2018-07-27 RX ORDER — BUPROPION HYDROCHLORIDE 150 MG/1
150 TABLET ORAL EVERY MORNING
Qty: 30 TABLET | Refills: 11 | Status: SHIPPED | OUTPATIENT
Start: 2018-07-27

## 2018-07-27 RX ORDER — TOPIRAMATE 25 MG/1
25 TABLET ORAL 2 TIMES DAILY
Qty: 60 TABLET | Refills: 11 | Status: SHIPPED | OUTPATIENT
Start: 2018-07-27

## 2018-07-27 RX ORDER — DIAZEPAM 10 MG/1
10 TABLET ORAL EVERY 8 HOURS PRN
Qty: 90 TABLET | Refills: 0 | Status: SHIPPED | OUTPATIENT
Start: 2018-07-27 | End: 2018-08-06

## 2018-07-27 ASSESSMENT — ENCOUNTER SYMPTOMS
EYES NEGATIVE: 1
DIARRHEA: 0
BLOOD IN STOOL: 0
RESPIRATORY NEGATIVE: 1
ABDOMINAL PAIN: 0

## 2018-07-27 NOTE — TELEPHONE ENCOUNTER
----- Message from PARVEEN Maradiaga sent at 7/27/2018  4:35 PM CDT -----  Please inform patient results show  Thyroid is normal  Continue current dose of synthroid  CMP is normal this is your liver and kidney function as well as electrolytes. Cbc shows anemia. A little worse. She was having dark stools,we really need to have her seen for this.      (side note, I have referred her several times to GI)

## 2018-07-27 NOTE — PROGRESS NOTES
Radha [address]  [place], [address] [place] [address] Rd  Phone [phone]   Fax [phone]      OFFICE VISIT: 7/27/2018    Elicia Wilson is a 63 y.o. female who presents today for her medical conditions/complaints as noted below. Elicia Wilson is c/o of Medication Refill (Valium); Depression (not taking Abilify- would like to discuss increasing Wellbutrin); Thyroid Problem; and Headache (Topamax not helping like it used to- would like to discuss increasing it)        HPI:     HPI  Depression  The abilify made her sleep too much. She stopped taking this. Would like to increase wellbutrin. Anxiety  Taking valium . This is helping with her anxiety. She is needing a refill. Headaches. States topamax isn't helping as much as it used to  Would like to try increasing this. Hypothyroid  She is currently taking her synthroid daily  Would like to check levels. BMI  She has increased her weight  States she is doing better about eating.      Past Medical History:   Diagnosis Date    Allergic rhinitis     Anemia     Anxiety     B12 deficiency     COPD (chronic obstructive pulmonary disease) (HCC)     Depression     Gastroparesis     Headache     Hypothyroid     Seizures (HCC)       Past Surgical History:   Procedure Laterality Date    CHOLECYSTECTOMY      COLON SURGERY      GASTRIC STIMULATOR IMPLANT SURGERY      HERNIA REPAIR      TUBAL LIGATION         Family History   Problem Relation Age of Onset    Diabetes Father     Heart Attack Father     Cancer Mother         ovarian, over 48    Heart Attack Brother     Cancer Maternal Aunt 40        ovarian       Social History   Substance Use Topics    Smoking status: Current Every Day Smoker     Packs/day: 1.00     Years: 30.00     Types: Cigarettes    Smokeless tobacco: Never Used      Comment: is cutting back    Alcohol use No      Current Outpatient Prescriptions   Medication Sig Dispense Refill    diazepam (VALIUM) 10 MG tablet Take 1 place.   Musculoskeletal: She exhibits no edema. Cervical back: She exhibits pain. Lumbar back: She exhibits decreased range of motion and pain. Neurological: She is alert and oriented to person, place, and time. She has normal reflexes. Skin: Skin is warm and dry. Rash (eczema) noted. Psychiatric: Her mood appears anxious. She is withdrawn. She expresses no homicidal and no suicidal ideation. She expresses no suicidal plans and no homicidal plans. Vitals reviewed. /70   Pulse 84   Temp 99.4 °F (37.4 °C) (Temporal)   Ht 5' 7\" (1.702 m)   Wt 110 lb (49.9 kg)   LMP 08/01/2008   SpO2 99%   BMI 17.23 kg/m²     Assessment:        ICD-10-CM ICD-9-CM    1. Moderate episode of recurrent major depressive disorder (HCC) F33.1 296.32 buPROPion (WELLBUTRIN XL) 150 MG extended release tablet      CBC      Comprehensive Metabolic Panel   2. Anxiety F41.9 300.00 diazepam (VALIUM) 10 MG tablet   3. Hypothyroidism, unspecified type E03.9 244.9 TSH without Reflex      T4, Free   4. Migraine without status migrainosus, not intractable, unspecified migraine type G43.909 346.90 Topiramate Level   5. BMI less than 19,adult Z68.1 V85.0        Plan:    stopped abilify  Increase wellbutrin to 450 mg  Continue valium  Check lab work. Will try increasing topamax, split dose and taking 1/2 in morning and 1/2 in evening. Gaining weight. Return in about 1 month (around 8/27/2018).     Orders Placed This Encounter   Procedures    CBC     Standing Status:   Future     Number of Occurrences:   1     Standing Expiration Date:   7/27/2019    Comprehensive Metabolic Panel     Standing Status:   Future     Number of Occurrences:   1     Standing Expiration Date:   7/27/2019    TSH without Reflex     Standing Status:   Future     Number of Occurrences:   1     Standing Expiration Date:   7/27/2019    T4, Free     Standing Status:   Future     Number of Occurrences:   1     Standing Expiration Date: 7/27/2019    Topiramate Level     Standing Status:   Future     Standing Expiration Date:   7/27/2019     Orders Placed This Encounter   Medications    diazepam (VALIUM) 10 MG tablet     Sig: Take 1 tablet by mouth every 8 hours as needed for Anxiety for up to 10 days. .     Dispense:  90 tablet     Refill:  0    buPROPion (WELLBUTRIN XL) 150 MG extended release tablet     Sig: Take 1 tablet by mouth every morning With the 300mg xl     Dispense:  30 tablet     Refill:  11    topiramate (TOPAMAX) 25 MG tablet     Sig: Take 1 tablet by mouth 2 times daily To take with the 200mg bid     Dispense:  60 tablet     Refill:  11         Discussed use, benefit, and side effects of prescribed medications. All patient questions answered. Pt voiced understanding. Reviewed health maintenance. .  Patient agreed with treatment plan. Follow up as directed. There are no Patient Instructions on file for this visit.       Electronically signed by PARVEEN Burnette on 7/27/2018 at 1:43 PM

## 2018-07-27 NOTE — TELEPHONE ENCOUNTER
Called patient, spoke with: Patient regarding the results of the patients most recent labs. I advised Patient of Reyes Agreste recommendations. Patient did voice understanding. Patient agreed to see Dr. Katy Diaz at [facility] [place], [place]. I sent an In-Basket message to Sabas Odell asking if patient's current referral is still valid or if we need to order a new one.

## 2018-08-02 ENCOUNTER — TELEPHONE (OUTPATIENT)
Dept: PRIMARY CARE CLINIC | Age: 58
End: 2018-08-02

## 2018-09-06 DIAGNOSIS — G43.909 MIGRAINE WITHOUT STATUS MIGRAINOSUS, NOT INTRACTABLE, UNSPECIFIED MIGRAINE TYPE: ICD-10-CM

## 2018-09-06 DIAGNOSIS — Z91.09 ENVIRONMENTAL ALLERGIES: ICD-10-CM

## 2018-09-06 RX ORDER — CYCLOSPORINE 0.5 MG/ML
1 EMULSION OPHTHALMIC 2 TIMES DAILY
Qty: 1 EACH | Refills: 0 | Status: SHIPPED | OUTPATIENT
Start: 2018-09-06

## 2018-09-06 RX ORDER — MONTELUKAST SODIUM 10 MG/1
10 TABLET ORAL NIGHTLY
Qty: 30 TABLET | Refills: 0 | Status: SHIPPED | OUTPATIENT
Start: 2018-09-06

## 2018-09-11 ENCOUNTER — TELEPHONE (OUTPATIENT)
Dept: PRIMARY CARE CLINIC | Age: 58
End: 2018-09-11

## 2018-09-11 RX ORDER — AZITHROMYCIN 250 MG/1
TABLET, FILM COATED ORAL
Qty: 1 PACKET | Refills: 0 | Status: SHIPPED | OUTPATIENT
Start: 2018-09-11 | End: 2018-09-15

## 2018-09-11 NOTE — TELEPHONE ENCOUNTER
LM med was sent to [facility] in [facility] [place]

## 2018-09-12 RX ORDER — BUSPIRONE HYDROCHLORIDE 10 MG/1
10 TABLET ORAL 3 TIMES DAILY
Qty: 30 TABLET | Refills: 0 | Status: SHIPPED | OUTPATIENT
Start: 2018-09-12

## 2020-10-07 DIAGNOSIS — Z12.31 SCREENING MAMMOGRAM, ENCOUNTER FOR: Primary | ICD-10-CM

## 2020-10-21 ENCOUNTER — HOSPITAL ENCOUNTER (OUTPATIENT)
Dept: RADIOLOGY | Facility: HOSPITAL | Age: 60
Discharge: HOME | End: 2020-10-21
Payer: MEDICAID

## 2020-10-21 VITALS — BODY MASS INDEX: 35.79 KG/M2 | HEIGHT: 70 IN | WEIGHT: 250 LBS

## 2020-10-21 DIAGNOSIS — Z12.31 SCREENING MAMMOGRAM, ENCOUNTER FOR: ICD-10-CM

## 2020-10-21 PROCEDURE — 77067 SCR MAMMO BI INCL CAD: CPT | Mod: TC

## 2020-11-02 DIAGNOSIS — R19.5 HEME POSITIVE STOOL: ICD-10-CM

## 2020-11-02 DIAGNOSIS — R19.5 HEME + STOOL: Primary | ICD-10-CM

## 2020-11-02 RX ORDER — SODIUM CHLORIDE 9 MG/ML
INJECTION, SOLUTION INTRAVENOUS CONTINUOUS
Status: CANCELLED | OUTPATIENT
Start: 2020-11-02

## 2020-11-02 RX ORDER — SODIUM CHLORIDE 0.9 % (FLUSH) 0.9 %
10 SYRINGE (ML) INJECTION
Status: CANCELLED | OUTPATIENT
Start: 2020-11-02

## 2020-11-06 ENCOUNTER — HOSPITAL ENCOUNTER (OUTPATIENT)
Dept: PULMONOLOGY | Facility: HOSPITAL | Age: 60
Discharge: HOME | End: 2020-11-06
Attending: SURGERY
Payer: MEDICAID

## 2020-11-06 ENCOUNTER — HOSPITAL ENCOUNTER (OUTPATIENT)
Dept: PREADMISSION TESTING | Facility: HOSPITAL | Age: 60
Discharge: HOME | End: 2020-11-06
Attending: SURGERY
Payer: MEDICAID

## 2020-11-06 ENCOUNTER — ANESTHESIA EVENT (OUTPATIENT)
Dept: ENDOSCOPY | Facility: HOSPITAL | Age: 60
End: 2020-11-06
Payer: MEDICAID

## 2020-11-06 VITALS — BODY MASS INDEX: 35.93 KG/M2 | HEIGHT: 70 IN | WEIGHT: 251 LBS

## 2020-11-06 DIAGNOSIS — R19.5 HEME + STOOL: ICD-10-CM

## 2020-11-06 DIAGNOSIS — Z03.818 ENCOUNTER FOR OBSERVATION FOR SUSPECTED EXPOSURE TO OTHER BIOLOGICAL AGENTS RULED OUT: ICD-10-CM

## 2020-11-06 PROCEDURE — 93010 EKG 12-LEAD: ICD-10-PCS | Mod: ,,, | Performed by: INTERNAL MEDICINE

## 2020-11-06 PROCEDURE — 93010 ELECTROCARDIOGRAM REPORT: CPT | Mod: ,,, | Performed by: INTERNAL MEDICINE

## 2020-11-06 PROCEDURE — 93005 ELECTROCARDIOGRAM TRACING: CPT

## 2020-11-06 RX ORDER — HYDROXYZINE HYDROCHLORIDE 25 MG/1
25 TABLET, FILM COATED ORAL 2 TIMES DAILY
COMMUNITY

## 2020-11-06 RX ORDER — PAROXETINE HYDROCHLORIDE 20 MG/1
20 TABLET, FILM COATED ORAL EVERY MORNING
COMMUNITY

## 2020-11-06 RX ORDER — CARVEDILOL 6.25 MG/1
6.25 TABLET ORAL 2 TIMES DAILY WITH MEALS
COMMUNITY

## 2020-11-06 RX ORDER — VERAPAMIL HYDROCHLORIDE 120 MG/1
120 CAPSULE, EXTENDED RELEASE ORAL 2 TIMES DAILY
COMMUNITY

## 2020-11-06 NOTE — ANESTHESIA PREPROCEDURE EVALUATION
11/06/2020  Brittany Ordonez is a 60 y.o., female.    Anesthesia Evaluation    I have reviewed the Patient Summary Reports.   I have reviewed the NPO Status.   I have reviewed the Medications.     Review of Systems  Anesthesia Hx:  No problems with previous Anesthesia  Denies Family Hx of Anesthesia complications.   Denies Personal Hx of Anesthesia complications.   Social:  Smoker    Cardiovascular:   Hypertension, well controlled    Pulmonary:  Pulmonary Normal    Renal/:  Renal/ Normal     Hepatic/GI:  Hepatic/GI Normal    Neurological:  Neurology Normal    Endocrine:   Diabetes, well controlled, type 2    Psych:   anxiety        Lab Results   Component Value Date    WBC 9.65 11/06/2020    HGB 13.4 11/06/2020    HCT 40.1 11/06/2020    MCV 91 11/06/2020     11/06/2020     CMP  Sodium   Date Value Ref Range Status   11/06/2020 141 136 - 145 mmol/L Final     Potassium   Date Value Ref Range Status   11/06/2020 4.1 3.5 - 5.1 mmol/L Final     Chloride   Date Value Ref Range Status   11/06/2020 110 95 - 110 mmol/L Final     CO2   Date Value Ref Range Status   11/06/2020 28 23 - 29 mmol/L Final     Glucose   Date Value Ref Range Status   11/06/2020 202 (H) 70 - 110 mg/dL Final     BUN   Date Value Ref Range Status   11/06/2020 11 6 - 20 mg/dL Final     Creatinine   Date Value Ref Range Status   11/06/2020 0.6 0.5 - 1.4 mg/dL Final     Calcium   Date Value Ref Range Status   11/06/2020 9.2 8.7 - 10.5 mg/dL Final     Total Protein   Date Value Ref Range Status   11/06/2020 7.2 6.0 - 8.4 g/dL Final     Albumin   Date Value Ref Range Status   11/06/2020 3.3 (L) 3.5 - 5.2 g/dL Final     Total Bilirubin   Date Value Ref Range Status   11/06/2020 0.9 0.1 - 1.0 mg/dL Final     Comment:     For infants and newborns, interpretation of results should be based  on gestational age, weight and in agreement with  clinical  observations.  Premature Infant recommended reference ranges:  Up to 24 hours.............<8.0 mg/dL  Up to 48 hours............<12.0 mg/dL  3-5 days..................<15.0 mg/dL  6-29 days.................<15.0 mg/dL  For patients on Eltrombopag therapy, use of Dimension [place] TBIL is   not   recommended.       Alkaline Phosphatase   Date Value Ref Range Status   11/06/2020 84 55 - 135 U/L Final     AST   Date Value Ref Range Status   11/06/2020 15 10 - 40 U/L Final     ALT   Date Value Ref Range Status   11/06/2020 18 10 - 44 U/L Final     Anion Gap   Date Value Ref Range Status   11/06/2020 3 (L) 8 - 16 mmol/L Final     eGFR if    Date Value Ref Range Status   11/06/2020 >60.0 >60 mL/min/1.73 m^2 Final     eGFR if non    Date Value Ref Range Status   11/06/2020 >60.0 >60 mL/min/1.73 m^2 Final     Comment:     Calculation used to obtain the estimated glomerular filtration  rate (eGFR) is the CKD-EPI equation.          Physical Exam  General:  Well nourished    Airway/Jaw/Neck:  Airway Findings: Mouth Opening: Normal Tongue: Normal  General Airway Assessment: Adult  Mallampati: II  Improves to II with phonation.  TM Distance: Normal, at least 6 cm  Jaw/Neck Findings:  Neck ROM: Normal ROM      Dental:  Dental Findings: In tact, Upper partial dentures   Chest/Lungs:  Chest/Lungs Findings: Clear to auscultation, Normal Respiratory Rate     Heart/Vascular:  Heart Findings: Rate: Normal  Rhythm: Regular Rhythm  Sounds: Normal        Mental Status:  Mental Status Findings:  Cooperative         Anesthesia Plan  Type of Anesthesia, risks & benefits discussed:  Anesthesia Type:  MAC  Patient's Preference:   Intra-op Monitoring Plan: standard ASA monitors  Intra-op Monitoring Plan Comments:   Post Op Pain Control Plan: multimodal analgesia  Post Op Pain Control Plan Comments:   Induction:    Beta Blocker:  Patient is on a Beta-Blocker and has received one dose within the past 24  hours (No further documentation required).       Informed Consent: Patient understands risks and agrees with Anesthesia plan.  Questions answered. Anesthesia consent signed with patient.  ASA Score: 3     Day of Surgery Review of History & Physical: I have interviewed and examined the patient. I have reviewed the patient's H&P dated:  There are no significant changes.  H&P update referred to the surgeon.         Ready For Surgery From Anesthesia Perspective.

## 2020-11-06 NOTE — DISCHARGE INSTRUCTIONS
BEFORE THE PROCEDURE:    REPORT ANY CHANGE IN YOUR PHYSICAL CONDITION TO YOUR DOCTOR IMMEDIATELY.  SELF ISOLATE AND CHECK TEMPERATURE DAILY, IF TEMP OVER 100, CALL PHYSICIAN IMMEDIATELY.   NO SMOKING OR ALCOHOL FOR 72 HOURS BEFORE YOUR PROCEDURE.   DO NOT EAT OR DRINK ANYTHING AFTER MIDNIGHT THE NIGHT BEFORE YOUR PROCEDURE    THE MORNING OF YOUR PROCEDURE:    PLEASE ARRIVE AT THE FRONT LOBBY AT 6 a.m. AND REPORT TO FIRST FLOOR REGISTRATION.   YOU MAY GET A PHONE CALL THE DAY BEFORE YOUR PROCEDURE IF THE APPOINTED TIME CHANGES.  NO MAKE UP OR NAIL POLISH.   ALL MINORS MUST BE ACCOMPANIED BY AN ADULT AT ALL TIMES.     TAKE A SHOWER/BATH THE NIGHT BEFORE YOUR PROCEDURE.     DAY OF YOUR PROCEDURE:    TAKE BLOOD PRESSURE MEDICATIONS THE MORNING OF YOUR PROCEDURE, WITH SMALL SIPS WATER, AS DIRECTED BY YOUR PHYSICIAN.   DO NOT TAKE ANY DIABETIC MEDICATIONS UNLESS DIRECTED TO DO SO BY YOUR PHYSICIAN.   CONTACT LENSES AND DENTURES MUST BE REMOVED.  A RESPONSIBLE ADULT MUST ACCOMPANY YOU HOME UPON DISCHARGE.   ONLY 1 VISITOR ALLOWED PER ROOM.       COVID 19: RETURN TO FIRST FLOOR REGISTRATION ON Monday November 9, 2020 @ 10:30 A.M.

## 2020-11-09 ENCOUNTER — HOSPITAL ENCOUNTER (OUTPATIENT)
Dept: PREADMISSION TESTING | Facility: HOSPITAL | Age: 60
Discharge: HOME | End: 2020-11-09
Attending: SURGERY
Payer: MEDICAID

## 2020-11-09 DIAGNOSIS — Z03.818 ENCOUNTER FOR OBSERVATION FOR SUSPECTED EXPOSURE TO OTHER BIOLOGICAL AGENTS RULED OUT: ICD-10-CM

## 2020-11-09 LAB — SARS-COV-2 RNA RESP QL NAA+PROBE: NOT DETECTED

## 2020-11-09 PROCEDURE — U0002 COVID-19 LAB TEST NON-CDC: HCPCS

## 2020-11-10 ENCOUNTER — ANESTHESIA (OUTPATIENT)
Dept: ENDOSCOPY | Facility: HOSPITAL | Age: 60
End: 2020-11-10
Payer: MEDICAID

## 2020-11-10 ENCOUNTER — HOSPITAL ENCOUNTER (OUTPATIENT)
Facility: HOSPITAL | Age: 60
Discharge: HOME | End: 2020-11-10
Attending: SURGERY | Admitting: SURGERY
Payer: MEDICAID

## 2020-11-10 VITALS
TEMPERATURE: 98 F | DIASTOLIC BLOOD PRESSURE: 64 MMHG | OXYGEN SATURATION: 96 % | HEART RATE: 50 BPM | SYSTOLIC BLOOD PRESSURE: 135 MMHG | RESPIRATION RATE: 18 BRPM

## 2020-11-10 DIAGNOSIS — K57.30 DIVERTICULOSIS LARGE INTESTINE W/O PERFORATION OR ABSCESS W/O BLEEDING: ICD-10-CM

## 2020-11-10 DIAGNOSIS — K63.5 POLYP OF COLON, UNSPECIFIED PART OF COLON, UNSPECIFIED TYPE: ICD-10-CM

## 2020-11-10 DIAGNOSIS — R19.5 HEME POSITIVE STOOL: Primary | ICD-10-CM

## 2020-11-10 LAB — POCT GLUCOSE: 178 MG/DL (ref 70–110)

## 2020-11-10 PROCEDURE — 37000009 HC ANESTHESIA EA ADD 15 MINS: Performed by: SURGERY

## 2020-11-10 PROCEDURE — 45385 COLONOSCOPY W/LESION REMOVAL: CPT | Performed by: SURGERY

## 2020-11-10 PROCEDURE — 25000003 PHARM REV CODE 250: Performed by: SURGERY

## 2020-11-10 PROCEDURE — 00811 ANES LWR INTST NDSC NOS: CPT | Performed by: SURGERY

## 2020-11-10 PROCEDURE — 63600175 PHARM REV CODE 636 W HCPCS: Performed by: NURSE ANESTHETIST, CERTIFIED REGISTERED

## 2020-11-10 PROCEDURE — 27201423 OPTIME MED/SURG SUP & DEVICES STERILE SUPPLY: Performed by: SURGERY

## 2020-11-10 PROCEDURE — 37000008 HC ANESTHESIA 1ST 15 MINUTES: Performed by: SURGERY

## 2020-11-10 PROCEDURE — 45380 COLONOSCOPY AND BIOPSY: CPT | Mod: 59

## 2020-11-10 RX ORDER — SODIUM CHLORIDE 0.9 % (FLUSH) 0.9 %
10 SYRINGE (ML) INJECTION
Status: DISCONTINUED | OUTPATIENT
Start: 2020-11-10 | End: 2020-11-10 | Stop reason: HOSPADM

## 2020-11-10 RX ORDER — SODIUM CHLORIDE 9 MG/ML
INJECTION, SOLUTION INTRAVENOUS CONTINUOUS
Status: DISCONTINUED | OUTPATIENT
Start: 2020-11-10 | End: 2020-11-10 | Stop reason: HOSPADM

## 2020-11-10 RX ORDER — PROPOFOL 10 MG/ML
VIAL (ML) INTRAVENOUS
Status: DISCONTINUED | OUTPATIENT
Start: 2020-11-10 | End: 2020-11-10

## 2020-11-10 RX ORDER — SODIUM CHLORIDE 9 MG/ML
INJECTION, SOLUTION INTRAVENOUS CONTINUOUS
Status: CANCELLED | OUTPATIENT
Start: 2020-11-10

## 2020-11-10 RX ADMIN — PROPOFOL 50 MG: 10 INJECTION, EMULSION INTRAVENOUS at 07:11

## 2020-11-10 RX ADMIN — PROPOFOL 50 MG: 10 INJECTION, EMULSION INTRAVENOUS at 08:11

## 2020-11-10 RX ADMIN — SODIUM CHLORIDE: 0.9 INJECTION, SOLUTION INTRAVENOUS at 06:11

## 2020-11-10 RX ADMIN — SODIUM CHLORIDE: 0.9 INJECTION, SOLUTION INTRAVENOUS at 07:11

## 2020-11-10 NOTE — DISCHARGE INSTRUCTIONS
NO DRIVING OR ALCOHOL 24 HOURS AFTER PROCEDURE    RESUME HOME MEDICATIONS    LOW RESIDUE DIET   NO NUTS   SEEDS  OR POPCORN    FOLLOW  UP WITH DR MCCULLOUGH AS SCHEDULED IN 10-14 DAYS     NOTIFY DR MCCULLOUGH OF ANY PROBLEMS OR CONCERNS

## 2020-11-10 NOTE — TRANSFER OF CARE
Anesthesia Transfer of Care Note    Patient: Brittany Ordonez    Procedure(s) Performed: Procedure(s) (LRB):  COLONOSCOPY (N/A)    Patient location: Other: endo    Anesthesia Type: MAC    Transport from OR: Transported from OR on room air with adequate spontaneous ventilation    Post pain: adequate analgesia    Post assessment: no apparent anesthetic complications    Post vital signs: stable    Level of consciousness: awake    Nausea/Vomiting: no nausea/vomiting    Complications: none    Transfer of care protocol was followed      Last vitals:   114/56  44 hr  16 rr  100% o2 sat

## 2020-11-10 NOTE — INTERVAL H&P NOTE
The patient has been examined and the H&P has been reviewed:    I concur with the findings and no changes have occurred since H&P was written.    Surgery risks, benefits and alternative options discussed and understood by patient/family.          Active Hospital Problems    Diagnosis  POA    Heme positive stool [R19.5]  Yes      Resolved Hospital Problems   No resolved problems to display.

## 2020-11-10 NOTE — OP NOTE
[facility] - Endoscopy  Colonoscopy Procedure  Operative Note    SUMMARY     Date of Procedure: 11/10/2020     Procedure: Procedure(s) (LRB):  COLONOSCOPY (N/A) with cold forceps biopsy and hot snare polypectomy    Surgeon(s) and Role:     * Nadya Quinn MD - Primary    Assisting Surgeon: None     Patient location: GI    Pre-Operative Diagnosis: Heme + stool [R19.5]    Post-Operative Diagnosis: Post-Op Diagnosis Codes:     * Heme + stool [R19.5]  Diverticulosis  Colon polyp     Indications:  Heme-positive stools and screening age for colon cancer          Procedure:                  The patient was brought in to the endoscopy suite where the risks, benefits, and alternatives of the procedure were described.  The patient was given the opportunity to ask questions and then signed informed consent.  Patient was positioned in the left lateral decubitus position, continuous monitoring was initiated, and supplemental oxygen was provided via nasal cannula.  Adequate sedation was achieved with the above mentioned medications and then titrated during the entire procedure.  Digital rectal exam was performed.  Under direct visualization the colonoscope was introduced through the anus in to the rectum.  The scope was then advanced to the cecum, which was identified by the ileocecal valve and appendiceal orifice.  Scope was then withdrawn and the mucosa was carefully examined in a circular fashion.  The entire colonic mucosa was examined, including the rectum with retroflexion.  Air was evacuated from the colon and the procedure was terminated.  The patient tolerated the procedure well and was able to be transferred to the recovery area in stable condition.    Findings:                 Digital rectal examination:  No palpable abnormality or significant hemorrhoidal disease                    Rectum:  Polyp at 20 cm taken entirely with the hot snare and retrieved in a suction retrieval trap.  Additional polypoid  mucosa with a divot in the middle concerning for possible inverted diverticulum.  Biopsies with the cold forceps were taken.                     Sigmoid:  Scattered diverticular disease both small and large mouth.  Polyps at 30 and 45 cm that were both taken with a hot snare and retrieved in a suction retrieval trap        Descending:  No mucosal abnormalities         Transverse:  No mucosal abnormalities         Ascending:  No mucosal abnormalities        Cecum:  No mucosal abnormalities.  Appendiceal orifice and ileocecal valve appreciated.        Terminal Ileum:  Not intubated     Specimens:   Specimen (12h ago, onward)    Polyps, mass biopsy            Estimated Blood Loss (EBL): * No values recorded between 11/10/2020 12:00 AM and 11/10/2020  8:21 AM *     Complications: No     Diagnostic Impression:  Diverticulosis and colon polyps     Recommendations: Discharge patient to home. Patient has a contact number available for emergencies. The signs and symptoms of potential delayed complications were discussed with the patient. Return to normal activities tomorrow. Written discharge instructions were provided to the patient. Resume previous diet. Continue present medications. Await pathology results.    Disposition: To recovery unit stable     Attestation: I performed the procedure.        Follow Up:             Future Appointments   Date Time Provider Department Center   3/30/2021 10:00 AM Henok Magaña II, MD [facility] UROLOGY [facility]           Nadya Quinn MD  11/10/2020

## 2020-11-10 NOTE — ADDENDUM NOTE
Addendum  created 11/10/20 0857 by Yao Bowman MD    Attestation recorded in Intraprocedure, Intraprocedure Attestations filed

## 2020-11-10 NOTE — ANESTHESIA POSTPROCEDURE EVALUATION
Anesthesia Post Evaluation    Patient: Brittany Ordonez    Procedure(s) Performed: Procedure(s) (LRB):  COLONOSCOPY, WITH POLYPECTOMY USING SNARE (N/A)    Final Anesthesia Type: MAC    Patient location during evaluation: OPS  Patient participation: Yes- Able to Participate  Level of consciousness: awake  Post-procedure vital signs: reviewed and stable  Pain management: adequate  Airway patency: patent    PONV status at discharge: No PONV  Anesthetic complications: no      Cardiovascular status: blood pressure returned to baseline  Respiratory status: spontaneous ventilation  Hydration status: euvolemic  Follow-up not needed.          Vitals Value Taken Time   /64 11/10/20 0831   Temp 36.5 °C (97.7 °F) 11/10/20 0831   Pulse 50 11/10/20 0831   Resp 18 11/10/20 0831   SpO2 96 % 11/10/20 0831         No case tracking events are documented in the log.      Pain/Sona Score: Sona Score: 10 (11/10/2020  8:18 AM)

## 2020-11-10 NOTE — DISCHARGE SUMMARY
Discharge Summary  General Surgery      Admit Date: 11/10/2020    Discharge Date :11/10/2020    Attending Physician: Nadya Quinn     Discharge Physician: Nadya Quinn    Discharged Condition: good    Discharge Diagnosis: Heme + stool [R19.5]  Diverticulosis  Colon polyps    Treatments/Procedures: Procedure(s) (LRB):  COLONOSCOPY, WITH POLYPECTOMY USING SNARE (N/A) and cold forceps biopsy    Hospital Course: Uneventful; Discharged home from Recovery    Significant Diagnostic Studies: none    Disposition: Home or Self Care    Diet:  Low residue with diverticular precautions    Follow up: Office 10-14 days    Activity: No restrictions.    Patient Instructions:   Current Discharge Medication List      CONTINUE these medications which have NOT CHANGED    Details   carvediloL (COREG) 6.25 MG tablet Take 6.25 mg by mouth 2 (two) times daily with meals.    Comments: .      hydrOXYzine HCL (ATARAX) 25 MG tablet Take 25 mg by mouth 2 (two) times a day.      metformin (GLUCOPHAGE) 1000 MG tablet Take 1,000 mg by mouth 2 (two) times daily with meals.      paroxetine (PAXIL) 20 MG tablet Take 20 mg by mouth every morning.      SITagliptin (JANUVIA) 100 MG Tab Take 100 mg by mouth once daily.      verapamiL (VERELAN) 120 MG C24P Take 120 mg by mouth 2 (two) times a day.    Comments: .             Discharge Procedure Orders   Diet general     Call MD for:  temperature >100.4     Call MD for:  persistent nausea and vomiting     Call MD for:  difficulty breathing, headache or visual disturbances     Call MD for:  extreme fatigue     Call MD for:  redness, tenderness, or signs of infection (pain, swelling, redness, odor or green/yellow discharge around incision site)     Call MD for:  severe uncontrolled pain     Call MD for:  persistent dizziness or light-headedness     Activity as tolerated

## 2021-02-24 ENCOUNTER — IMMUNIZATION (OUTPATIENT)
Dept: OBSTETRICS AND GYNECOLOGY | Facility: CLINIC | Age: 61
End: 2021-02-24
Payer: MEDICAID

## 2021-02-24 DIAGNOSIS — Z23 NEED FOR VACCINATION: Primary | ICD-10-CM

## 2021-02-24 PROCEDURE — 0001A COVID-19, MRNA, LNP-S, PF, 30 MCG/0.3 ML DOSE VACCINE: CPT | Mod: CV19,,, | Performed by: ANESTHESIOLOGY

## 2021-02-24 PROCEDURE — 91300 COVID-19, MRNA, LNP-S, PF, 30 MCG/0.3 ML DOSE VACCINE: CPT | Mod: ,,, | Performed by: ANESTHESIOLOGY

## 2021-02-24 PROCEDURE — 0001A COVID-19, MRNA, LNP-S, PF, 30 MCG/0.3 ML DOSE VACCINE: ICD-10-PCS | Mod: CV19,,, | Performed by: ANESTHESIOLOGY

## 2021-02-24 PROCEDURE — 91300 COVID-19, MRNA, LNP-S, PF, 30 MCG/0.3 ML DOSE VACCINE: ICD-10-PCS | Mod: ,,, | Performed by: ANESTHESIOLOGY

## 2021-03-17 ENCOUNTER — IMMUNIZATION (OUTPATIENT)
Dept: OBSTETRICS AND GYNECOLOGY | Facility: CLINIC | Age: 61
End: 2021-03-17
Payer: MEDICAID

## 2021-03-17 DIAGNOSIS — Z23 NEED FOR VACCINATION: Primary | ICD-10-CM

## 2021-03-17 PROCEDURE — 91300 COVID-19, MRNA, LNP-S, PF, 30 MCG/0.3 ML DOSE VACCINE: CPT | Mod: ,,, | Performed by: ANESTHESIOLOGY

## 2021-03-17 PROCEDURE — 0002A COVID-19, MRNA, LNP-S, PF, 30 MCG/0.3 ML DOSE VACCINE: ICD-10-PCS | Mod: CV19,,, | Performed by: ANESTHESIOLOGY

## 2021-03-17 PROCEDURE — 0002A COVID-19, MRNA, LNP-S, PF, 30 MCG/0.3 ML DOSE VACCINE: CPT | Mod: CV19,,, | Performed by: ANESTHESIOLOGY

## 2021-03-17 PROCEDURE — 91300 COVID-19, MRNA, LNP-S, PF, 30 MCG/0.3 ML DOSE VACCINE: ICD-10-PCS | Mod: ,,, | Performed by: ANESTHESIOLOGY

## 2021-05-04 ENCOUNTER — TELEPHONE (OUTPATIENT)
Dept: PRIMARY CARE CLINIC | Age: 61
End: 2021-05-04

## 2022-02-01 DIAGNOSIS — Z12.31 ENCOUNTER FOR SCREENING MAMMOGRAM FOR MALIGNANT NEOPLASM OF BREAST: Primary | ICD-10-CM

## 2022-02-23 ENCOUNTER — HOSPITAL ENCOUNTER (OUTPATIENT)
Dept: RADIOLOGY | Facility: HOSPITAL | Age: 62
Discharge: HOME | End: 2022-02-23
Attending: NURSE PRACTITIONER
Payer: MEDICAID

## 2022-02-23 VITALS — HEIGHT: 70 IN | WEIGHT: 251 LBS | BODY MASS INDEX: 35.93 KG/M2

## 2022-02-23 DIAGNOSIS — Z12.31 ENCOUNTER FOR SCREENING MAMMOGRAM FOR MALIGNANT NEOPLASM OF BREAST: ICD-10-CM

## 2022-02-23 PROCEDURE — 77067 SCR MAMMO BI INCL CAD: CPT | Mod: TC

## 2023-06-22 ENCOUNTER — HOSPITAL ENCOUNTER (OUTPATIENT)
Dept: RADIOLOGY | Facility: HOSPITAL | Age: 63
Discharge: HOME | End: 2023-06-22
Attending: NURSE PRACTITIONER
Payer: MEDICAID

## 2023-06-22 VITALS — HEIGHT: 71 IN | WEIGHT: 242 LBS | BODY MASS INDEX: 33.88 KG/M2

## 2023-06-22 DIAGNOSIS — Z12.31 ENCOUNTER FOR SCREENING MAMMOGRAM FOR MALIGNANT NEOPLASM OF BREAST: ICD-10-CM

## 2023-06-22 PROCEDURE — 77067 SCR MAMMO BI INCL CAD: CPT | Mod: TC

## 2023-06-29 ENCOUNTER — HOSPITAL ENCOUNTER (OUTPATIENT)
Dept: RADIOLOGY | Facility: HOSPITAL | Age: 63
Discharge: HOME | End: 2023-06-29
Attending: NURSE PRACTITIONER
Payer: MEDICAID

## 2023-06-29 DIAGNOSIS — F17.210 CIGARETTE NICOTINE DEPENDENCE WITHOUT COMPLICATION: ICD-10-CM

## 2023-06-29 PROCEDURE — 71271 CT THORAX LUNG CANCER SCR C-: CPT | Mod: TC

## 2025-03-19 ENCOUNTER — HOSPITAL ENCOUNTER (INPATIENT)
Facility: HOSPITAL | Age: 65
LOS: 7 days | Discharge: HOME | DRG: 030 | End: 2025-03-26
Attending: EMERGENCY MEDICINE | Admitting: INTERNAL MEDICINE
Payer: MEDICAID

## 2025-03-19 ENCOUNTER — ANESTHESIA EVENT (OUTPATIENT)
Dept: SURGERY | Facility: HOSPITAL | Age: 65
End: 2025-03-19
Payer: MEDICAID

## 2025-03-19 ENCOUNTER — ANESTHESIA (OUTPATIENT)
Dept: SURGERY | Facility: HOSPITAL | Age: 65
End: 2025-03-19
Payer: MEDICAID

## 2025-03-19 DIAGNOSIS — I82.409 DVT (DEEP VENOUS THROMBOSIS): ICD-10-CM

## 2025-03-19 DIAGNOSIS — G95.89 CERVICAL SPINAL MASS: ICD-10-CM

## 2025-03-19 DIAGNOSIS — I48.91 A-FIB: ICD-10-CM

## 2025-03-19 DIAGNOSIS — M54.2 CERVICAL SPINE PAIN: ICD-10-CM

## 2025-03-19 DIAGNOSIS — G95.20 SPINAL CORD COMPRESSION: Primary | ICD-10-CM

## 2025-03-19 DIAGNOSIS — M48.9 MASS OF THORACIC VERTEBRA: ICD-10-CM

## 2025-03-19 DIAGNOSIS — R00.1 BRADYCARDIA: ICD-10-CM

## 2025-03-19 DIAGNOSIS — R07.9 CHEST PAIN: ICD-10-CM

## 2025-03-19 DIAGNOSIS — R00.0 TACHYCARDIA: ICD-10-CM

## 2025-03-19 LAB
ALBUMIN SERPL-MCNC: 3.4 G/DL (ref 3.4–4.8)
ALBUMIN SERPL-MCNC: 3.6 G/DL (ref 3.4–4.8)
ALBUMIN/GLOB SERPL: 1 RATIO (ref 1.1–2)
ALBUMIN/GLOB SERPL: 1.2 RATIO (ref 1.1–2)
ALP SERPL-CCNC: 76 UNIT/L (ref 40–150)
ALP SERPL-CCNC: 82 UNIT/L (ref 40–150)
ALT SERPL-CCNC: 15 UNIT/L (ref 0–55)
ALT SERPL-CCNC: 15 UNIT/L (ref 0–55)
ANION GAP SERPL CALC-SCNC: 11 MEQ/L
ANION GAP SERPL CALC-SCNC: 12 MEQ/L
APTT PPP: 28.5 SECONDS (ref 23.2–33.7)
AST SERPL-CCNC: 14 UNIT/L (ref 5–34)
AST SERPL-CCNC: 18 UNIT/L (ref 5–34)
BASOPHILS # BLD AUTO: 0.1 X10(3)/MCL
BASOPHILS # BLD AUTO: 0.1 X10(3)/MCL
BASOPHILS NFR BLD AUTO: 0.9 %
BASOPHILS NFR BLD AUTO: 0.9 %
BILIRUB SERPL-MCNC: 0.8 MG/DL
BILIRUB SERPL-MCNC: 1 MG/DL
BUN SERPL-MCNC: 11.6 MG/DL (ref 9.8–20.1)
BUN SERPL-MCNC: 12.4 MG/DL (ref 9.8–20.1)
CALCIUM SERPL-MCNC: 9.1 MG/DL (ref 8.4–10.2)
CALCIUM SERPL-MCNC: 9.1 MG/DL (ref 8.4–10.2)
CHLORIDE SERPL-SCNC: 107 MMOL/L (ref 98–107)
CHLORIDE SERPL-SCNC: 109 MMOL/L (ref 98–107)
CO2 SERPL-SCNC: 22 MMOL/L (ref 23–31)
CO2 SERPL-SCNC: 24 MMOL/L (ref 23–31)
CREAT SERPL-MCNC: 0.59 MG/DL (ref 0.55–1.02)
CREAT SERPL-MCNC: 0.6 MG/DL (ref 0.55–1.02)
CREAT/UREA NIT SERPL: 20
CREAT/UREA NIT SERPL: 21
EOSINOPHIL # BLD AUTO: 0.48 X10(3)/MCL (ref 0–0.9)
EOSINOPHIL # BLD AUTO: 0.5 X10(3)/MCL (ref 0–0.9)
EOSINOPHIL NFR BLD AUTO: 4.1 %
EOSINOPHIL NFR BLD AUTO: 4.6 %
ERYTHROCYTE [DISTWIDTH] IN BLOOD BY AUTOMATED COUNT: 12.7 % (ref 11.5–17)
ERYTHROCYTE [DISTWIDTH] IN BLOOD BY AUTOMATED COUNT: 12.8 % (ref 11.5–17)
GFR SERPLBLD CREATININE-BSD FMLA CKD-EPI: >60 ML/MIN/1.73/M2
GFR SERPLBLD CREATININE-BSD FMLA CKD-EPI: >60 ML/MIN/1.73/M2
GLOBULIN SER-MCNC: 2.9 GM/DL (ref 2.4–3.5)
GLOBULIN SER-MCNC: 3.3 GM/DL (ref 2.4–3.5)
GLUCOSE SERPL-MCNC: 135 MG/DL (ref 82–115)
GLUCOSE SERPL-MCNC: 155 MG/DL (ref 82–115)
GROUP & RH: NORMAL
HCT VFR BLD AUTO: 41.4 % (ref 37–47)
HCT VFR BLD AUTO: 43.6 % (ref 37–47)
HGB BLD-MCNC: 14.2 G/DL (ref 12–16)
HGB BLD-MCNC: 14.9 G/DL (ref 12–16)
IMM GRANULOCYTES # BLD AUTO: 0.02 X10(3)/MCL (ref 0–0.04)
IMM GRANULOCYTES # BLD AUTO: 0.04 X10(3)/MCL (ref 0–0.04)
IMM GRANULOCYTES NFR BLD AUTO: 0.2 %
IMM GRANULOCYTES NFR BLD AUTO: 0.4 %
INDIRECT COOMBS: NORMAL
INR PPP: 1.1
LYMPHOCYTES # BLD AUTO: 3.1 X10(3)/MCL (ref 0.6–4.6)
LYMPHOCYTES # BLD AUTO: 3.51 X10(3)/MCL (ref 0.6–4.6)
LYMPHOCYTES NFR BLD AUTO: 26.5 %
LYMPHOCYTES NFR BLD AUTO: 32.6 %
MAGNESIUM SERPL-MCNC: 1.6 MG/DL (ref 1.6–2.6)
MCH RBC QN AUTO: 30.9 PG (ref 27–31)
MCH RBC QN AUTO: 31 PG (ref 27–31)
MCHC RBC AUTO-ENTMCNC: 34.2 G/DL (ref 33–36)
MCHC RBC AUTO-ENTMCNC: 34.3 G/DL (ref 33–36)
MCV RBC AUTO: 90.2 FL (ref 80–94)
MCV RBC AUTO: 90.6 FL (ref 80–94)
MONOCYTES # BLD AUTO: 0.73 X10(3)/MCL (ref 0.1–1.3)
MONOCYTES # BLD AUTO: 0.79 X10(3)/MCL (ref 0.1–1.3)
MONOCYTES NFR BLD AUTO: 6.8 %
MONOCYTES NFR BLD AUTO: 6.8 %
NEUTROPHILS # BLD AUTO: 5.9 X10(3)/MCL (ref 2.1–9.2)
NEUTROPHILS # BLD AUTO: 7.2 X10(3)/MCL (ref 2.1–9.2)
NEUTROPHILS NFR BLD AUTO: 54.7 %
NEUTROPHILS NFR BLD AUTO: 61.5 %
NRBC BLD AUTO-RTO: 0 %
NRBC BLD AUTO-RTO: 0 %
PLATELET # BLD AUTO: 295 X10(3)/MCL (ref 130–400)
PLATELET # BLD AUTO: 303 X10(3)/MCL (ref 130–400)
PMV BLD AUTO: 8.9 FL (ref 7.4–10.4)
PMV BLD AUTO: 9.1 FL (ref 7.4–10.4)
POCT GLUCOSE: 114 MG/DL (ref 70–110)
POCT GLUCOSE: 119 MG/DL (ref 70–110)
POCT GLUCOSE: 155 MG/DL (ref 70–110)
POTASSIUM SERPL-SCNC: 3.5 MMOL/L (ref 3.5–5.1)
POTASSIUM SERPL-SCNC: 4.2 MMOL/L (ref 3.5–5.1)
PROT SERPL-MCNC: 6.5 GM/DL (ref 5.8–7.6)
PROT SERPL-MCNC: 6.7 GM/DL (ref 5.8–7.6)
PROTHROMBIN TIME: 13.6 SECONDS (ref 12.5–14.5)
RBC # BLD AUTO: 4.59 X10(6)/MCL (ref 4.2–5.4)
RBC # BLD AUTO: 4.81 X10(6)/MCL (ref 4.2–5.4)
SODIUM SERPL-SCNC: 142 MMOL/L (ref 136–145)
SODIUM SERPL-SCNC: 143 MMOL/L (ref 136–145)
SPECIMEN OUTDATE: NORMAL
WBC # BLD AUTO: 10.78 X10(3)/MCL (ref 4.5–11.5)
WBC # BLD AUTO: 11.69 X10(3)/MCL (ref 4.5–11.5)

## 2025-03-19 PROCEDURE — 85025 COMPLETE CBC W/AUTO DIFF WBC: CPT | Performed by: NURSE PRACTITIONER

## 2025-03-19 PROCEDURE — 36415 COLL VENOUS BLD VENIPUNCTURE: CPT | Performed by: NURSE PRACTITIONER

## 2025-03-19 PROCEDURE — 63600175 PHARM REV CODE 636 W HCPCS: Mod: JZ,TB | Performed by: ANESTHESIOLOGY

## 2025-03-19 PROCEDURE — 83735 ASSAY OF MAGNESIUM: CPT | Performed by: NURSE PRACTITIONER

## 2025-03-19 PROCEDURE — 25000003 PHARM REV CODE 250: Performed by: NEUROLOGICAL SURGERY

## 2025-03-19 PROCEDURE — 63600175 PHARM REV CODE 636 W HCPCS

## 2025-03-19 PROCEDURE — 86901 BLOOD TYPING SEROLOGIC RH(D): CPT | Performed by: EMERGENCY MEDICINE

## 2025-03-19 PROCEDURE — 80053 COMPREHEN METABOLIC PANEL: CPT | Performed by: NURSE PRACTITIONER

## 2025-03-19 PROCEDURE — 85610 PROTHROMBIN TIME: CPT | Performed by: NURSE PRACTITIONER

## 2025-03-19 PROCEDURE — P9047 ALBUMIN (HUMAN), 25%, 50ML: HCPCS

## 2025-03-19 PROCEDURE — 63600175 PHARM REV CODE 636 W HCPCS: Performed by: NEUROLOGICAL SURGERY

## 2025-03-19 PROCEDURE — 36000711: Performed by: NEUROLOGICAL SURGERY

## 2025-03-19 PROCEDURE — 37000008 HC ANESTHESIA 1ST 15 MINUTES: Performed by: NEUROLOGICAL SURGERY

## 2025-03-19 PROCEDURE — 71000033 HC RECOVERY, INTIAL HOUR: Performed by: NEUROLOGICAL SURGERY

## 2025-03-19 PROCEDURE — 63600175 PHARM REV CODE 636 W HCPCS: Performed by: NURSE PRACTITIONER

## 2025-03-19 PROCEDURE — 27201423 OPTIME MED/SURG SUP & DEVICES STERILE SUPPLY: Performed by: NEUROLOGICAL SURGERY

## 2025-03-19 PROCEDURE — 63600175 PHARM REV CODE 636 W HCPCS: Performed by: ANESTHESIOLOGY

## 2025-03-19 PROCEDURE — 37000009 HC ANESTHESIA EA ADD 15 MINS: Performed by: NEUROLOGICAL SURGERY

## 2025-03-19 PROCEDURE — 25000003 PHARM REV CODE 250

## 2025-03-19 PROCEDURE — 11000001 HC ACUTE MED/SURG PRIVATE ROOM

## 2025-03-19 PROCEDURE — 85025 COMPLETE CBC W/AUTO DIFF WBC: CPT | Performed by: EMERGENCY MEDICINE

## 2025-03-19 PROCEDURE — 25000003 PHARM REV CODE 250: Performed by: HOSPITALIST

## 2025-03-19 PROCEDURE — 01N80ZZ RELEASE THORACIC NERVE, OPEN APPROACH: ICD-10-PCS | Performed by: NEUROLOGICAL SURGERY

## 2025-03-19 PROCEDURE — 80053 COMPREHEN METABOLIC PANEL: CPT | Performed by: EMERGENCY MEDICINE

## 2025-03-19 PROCEDURE — 21400001 HC TELEMETRY ROOM

## 2025-03-19 PROCEDURE — 85730 THROMBOPLASTIN TIME PARTIAL: CPT | Performed by: NURSE PRACTITIONER

## 2025-03-19 PROCEDURE — 00BX0ZZ EXCISION OF THORACIC SPINAL CORD, OPEN APPROACH: ICD-10-PCS | Performed by: NEUROLOGICAL SURGERY

## 2025-03-19 PROCEDURE — 36000710: Performed by: NEUROLOGICAL SURGERY

## 2025-03-19 PROCEDURE — 27000221 HC OXYGEN, UP TO 24 HOURS

## 2025-03-19 PROCEDURE — 99285 EMERGENCY DEPT VISIT HI MDM: CPT | Mod: 25

## 2025-03-19 PROCEDURE — 36620 INSERTION CATHETER ARTERY: CPT | Performed by: ANESTHESIOLOGY

## 2025-03-19 PROCEDURE — 00NX0ZZ RELEASE THORACIC SPINAL CORD, OPEN APPROACH: ICD-10-PCS | Performed by: NEUROLOGICAL SURGERY

## 2025-03-19 PROCEDURE — 25500020 PHARM REV CODE 255: Performed by: EMERGENCY MEDICINE

## 2025-03-19 RX ORDER — ALBUMIN HUMAN 250 G/1000ML
SOLUTION INTRAVENOUS
Status: DISCONTINUED | OUTPATIENT
Start: 2025-03-19 | End: 2025-03-19

## 2025-03-19 RX ORDER — PAROXETINE HYDROCHLORIDE 20 MG/1
20 TABLET, FILM COATED ORAL EVERY MORNING
Status: DISCONTINUED | OUTPATIENT
Start: 2025-03-19 | End: 2025-03-20

## 2025-03-19 RX ORDER — OXYCODONE HYDROCHLORIDE 5 MG/1
5 TABLET ORAL
Status: DISCONTINUED | OUTPATIENT
Start: 2025-03-19 | End: 2025-03-19 | Stop reason: HOSPADM

## 2025-03-19 RX ORDER — HALOPERIDOL LACTATE 5 MG/ML
0.5 INJECTION, SOLUTION INTRAMUSCULAR EVERY 10 MIN PRN
Status: DISCONTINUED | OUTPATIENT
Start: 2025-03-19 | End: 2025-03-19 | Stop reason: HOSPADM

## 2025-03-19 RX ORDER — ONDANSETRON HYDROCHLORIDE 2 MG/ML
4 INJECTION, SOLUTION INTRAVENOUS DAILY PRN
Status: DISCONTINUED | OUTPATIENT
Start: 2025-03-19 | End: 2025-03-19 | Stop reason: HOSPADM

## 2025-03-19 RX ORDER — ONDANSETRON HYDROCHLORIDE 2 MG/ML
4 INJECTION, SOLUTION INTRAVENOUS EVERY 4 HOURS PRN
Status: DISCONTINUED | OUTPATIENT
Start: 2025-03-19 | End: 2025-03-22

## 2025-03-19 RX ORDER — ONDANSETRON HYDROCHLORIDE 2 MG/ML
4 INJECTION, SOLUTION INTRAVENOUS ONCE
Status: COMPLETED | OUTPATIENT
Start: 2025-03-19 | End: 2025-03-19

## 2025-03-19 RX ORDER — FENTANYL CITRATE 50 UG/ML
INJECTION, SOLUTION INTRAMUSCULAR; INTRAVENOUS
Status: DISCONTINUED | OUTPATIENT
Start: 2025-03-19 | End: 2025-03-19

## 2025-03-19 RX ORDER — GLUCAGON 1 MG
1 KIT INJECTION
Status: DISCONTINUED | OUTPATIENT
Start: 2025-03-19 | End: 2025-03-19 | Stop reason: HOSPADM

## 2025-03-19 RX ORDER — MORPHINE SULFATE 4 MG/ML
1 INJECTION, SOLUTION INTRAMUSCULAR; INTRAVENOUS
Refills: 0 | Status: DISCONTINUED | OUTPATIENT
Start: 2025-03-19 | End: 2025-03-26 | Stop reason: HOSPADM

## 2025-03-19 RX ORDER — ALUMINUM HYDROXIDE, MAGNESIUM HYDROXIDE, AND SIMETHICONE 1200; 120; 1200 MG/30ML; MG/30ML; MG/30ML
30 SUSPENSION ORAL 4 TIMES DAILY PRN
Status: DISCONTINUED | OUTPATIENT
Start: 2025-03-19 | End: 2025-03-19

## 2025-03-19 RX ORDER — PHENYLEPHRINE HYDROCHLORIDE 10 MG/ML
INJECTION INTRAVENOUS
Status: DISCONTINUED | OUTPATIENT
Start: 2025-03-19 | End: 2025-03-19

## 2025-03-19 RX ORDER — BISACODYL 10 MG/1
10 SUPPOSITORY RECTAL DAILY
Status: DISCONTINUED | OUTPATIENT
Start: 2025-03-19 | End: 2025-03-26 | Stop reason: HOSPADM

## 2025-03-19 RX ORDER — HYDROXYZINE HYDROCHLORIDE 25 MG/1
TABLET, FILM COATED ORAL
Status: DISCONTINUED
Start: 2025-03-19 | End: 2025-03-19 | Stop reason: WASHOUT

## 2025-03-19 RX ORDER — SUCCINYLCHOLINE CHLORIDE 20 MG/ML
INJECTION INTRAMUSCULAR; INTRAVENOUS
Status: DISCONTINUED | OUTPATIENT
Start: 2025-03-19 | End: 2025-03-19

## 2025-03-19 RX ORDER — HYDROXYZINE HYDROCHLORIDE 25 MG/1
25 TABLET, FILM COATED ORAL 2 TIMES DAILY
Status: DISCONTINUED | OUTPATIENT
Start: 2025-03-19 | End: 2025-03-22

## 2025-03-19 RX ORDER — EPHEDRINE SULFATE 50 MG/ML
INJECTION, SOLUTION INTRAVENOUS
Status: DISCONTINUED | OUTPATIENT
Start: 2025-03-19 | End: 2025-03-19

## 2025-03-19 RX ORDER — HYDROMORPHONE HYDROCHLORIDE 2 MG/ML
0.4 INJECTION, SOLUTION INTRAMUSCULAR; INTRAVENOUS; SUBCUTANEOUS EVERY 5 MIN PRN
Status: DISCONTINUED | OUTPATIENT
Start: 2025-03-19 | End: 2025-03-19 | Stop reason: HOSPADM

## 2025-03-19 RX ORDER — PROPOFOL 10 MG/ML
VIAL (ML) INTRAVENOUS
Status: DISCONTINUED | OUTPATIENT
Start: 2025-03-19 | End: 2025-03-19

## 2025-03-19 RX ORDER — ACETAMINOPHEN 10 MG/ML
INJECTION, SOLUTION INTRAVENOUS
Status: DISCONTINUED | OUTPATIENT
Start: 2025-03-19 | End: 2025-03-19

## 2025-03-19 RX ORDER — MUPIROCIN 20 MG/G
OINTMENT TOPICAL 2 TIMES DAILY
Status: DISPENSED | OUTPATIENT
Start: 2025-03-19 | End: 2025-03-24

## 2025-03-19 RX ORDER — ACETAMINOPHEN 325 MG/1
650 TABLET ORAL EVERY 4 HOURS PRN
Status: DISCONTINUED | OUTPATIENT
Start: 2025-03-19 | End: 2025-03-26 | Stop reason: HOSPADM

## 2025-03-19 RX ORDER — NALOXONE HCL 0.4 MG/ML
0.02 VIAL (ML) INJECTION
Status: DISCONTINUED | OUTPATIENT
Start: 2025-03-19 | End: 2025-03-26 | Stop reason: HOSPADM

## 2025-03-19 RX ORDER — SODIUM CHLORIDE 9 MG/ML
INJECTION, SOLUTION INTRAVENOUS CONTINUOUS
Status: DISCONTINUED | OUTPATIENT
Start: 2025-03-19 | End: 2025-03-20

## 2025-03-19 RX ORDER — BISACODYL 10 MG/1
10 SUPPOSITORY RECTAL DAILY PRN
Status: DISCONTINUED | OUTPATIENT
Start: 2025-03-19 | End: 2025-03-26 | Stop reason: HOSPADM

## 2025-03-19 RX ORDER — MIDAZOLAM HYDROCHLORIDE 1 MG/ML
INJECTION INTRAMUSCULAR; INTRAVENOUS
Status: DISCONTINUED | OUTPATIENT
Start: 2025-03-19 | End: 2025-03-19

## 2025-03-19 RX ORDER — ONDANSETRON HYDROCHLORIDE 2 MG/ML
INJECTION, SOLUTION INTRAVENOUS
Status: DISCONTINUED | OUTPATIENT
Start: 2025-03-19 | End: 2025-03-19

## 2025-03-19 RX ORDER — GABAPENTIN 300 MG/1
CAPSULE ORAL
Status: DISPENSED
Start: 2025-03-19 | End: 2025-03-19

## 2025-03-19 RX ORDER — LIDOCAINE HYDROCHLORIDE AND EPINEPHRINE 5; 5 MG/ML; UG/ML
INJECTION, SOLUTION INFILTRATION; PERINEURAL
Status: DISCONTINUED | OUTPATIENT
Start: 2025-03-19 | End: 2025-03-19 | Stop reason: HOSPADM

## 2025-03-19 RX ORDER — CARVEDILOL 3.12 MG/1
TABLET ORAL
Status: DISPENSED
Start: 2025-03-19 | End: 2025-03-19

## 2025-03-19 RX ORDER — GLYCOPYRROLATE 0.2 MG/ML
INJECTION INTRAMUSCULAR; INTRAVENOUS
Status: DISCONTINUED | OUTPATIENT
Start: 2025-03-19 | End: 2025-03-19

## 2025-03-19 RX ORDER — TALC
6 POWDER (GRAM) TOPICAL NIGHTLY PRN
Status: DISCONTINUED | OUTPATIENT
Start: 2025-03-19 | End: 2025-03-26 | Stop reason: HOSPADM

## 2025-03-19 RX ORDER — PROPOFOL 10 MG/ML
VIAL (ML) INTRAVENOUS CONTINUOUS PRN
Status: DISCONTINUED | OUTPATIENT
Start: 2025-03-19 | End: 2025-03-19

## 2025-03-19 RX ORDER — PAROXETINE HYDROCHLORIDE 20 MG/1
TABLET, FILM COATED ORAL
Status: DISPENSED
Start: 2025-03-19 | End: 2025-03-19

## 2025-03-19 RX ORDER — DIPHENHYDRAMINE HYDROCHLORIDE 50 MG/ML
25 INJECTION, SOLUTION INTRAMUSCULAR; INTRAVENOUS EVERY 6 HOURS PRN
Status: DISCONTINUED | OUTPATIENT
Start: 2025-03-19 | End: 2025-03-19 | Stop reason: HOSPADM

## 2025-03-19 RX ORDER — AMOXICILLIN 250 MG
1 CAPSULE ORAL 2 TIMES DAILY PRN
Status: DISCONTINUED | OUTPATIENT
Start: 2025-03-19 | End: 2025-03-26 | Stop reason: HOSPADM

## 2025-03-19 RX ORDER — CEFAZOLIN 2 G/1
INJECTION, POWDER, FOR SOLUTION INTRAMUSCULAR; INTRAVENOUS
Status: DISCONTINUED | OUTPATIENT
Start: 2025-03-19 | End: 2025-03-19

## 2025-03-19 RX ORDER — MIDAZOLAM HYDROCHLORIDE 1 MG/ML
2 INJECTION INTRAMUSCULAR; INTRAVENOUS ONCE
Status: COMPLETED | OUTPATIENT
Start: 2025-03-19 | End: 2025-03-19

## 2025-03-19 RX ORDER — CEFAZOLIN SODIUM 2 G/50ML
2 SOLUTION INTRAVENOUS
Status: COMPLETED | OUTPATIENT
Start: 2025-03-19 | End: 2025-03-20

## 2025-03-19 RX ORDER — MORPHINE SULFATE 4 MG/ML
4 INJECTION, SOLUTION INTRAMUSCULAR; INTRAVENOUS
Refills: 0 | Status: DISCONTINUED | OUTPATIENT
Start: 2025-03-19 | End: 2025-03-26 | Stop reason: HOSPADM

## 2025-03-19 RX ORDER — ACETAMINOPHEN 500 MG
1000 TABLET ORAL EVERY 6 HOURS PRN
Status: DISCONTINUED | OUTPATIENT
Start: 2025-03-19 | End: 2025-03-19

## 2025-03-19 RX ORDER — AMOXICILLIN 250 MG
2 CAPSULE ORAL 2 TIMES DAILY
Status: DISCONTINUED | OUTPATIENT
Start: 2025-03-19 | End: 2025-03-26 | Stop reason: HOSPADM

## 2025-03-19 RX ORDER — CALCIUM CHLORIDE INJECTION 100 MG/ML
INJECTION, SOLUTION INTRAVENOUS
Status: DISCONTINUED | OUTPATIENT
Start: 2025-03-19 | End: 2025-03-19

## 2025-03-19 RX ORDER — BACITRACIN 500 [USP'U]/G
OINTMENT TOPICAL
Status: DISCONTINUED | OUTPATIENT
Start: 2025-03-19 | End: 2025-03-19 | Stop reason: HOSPADM

## 2025-03-19 RX ORDER — DEXAMETHASONE SODIUM PHOSPHATE 4 MG/ML
INJECTION, SOLUTION INTRA-ARTICULAR; INTRALESIONAL; INTRAMUSCULAR; INTRAVENOUS; SOFT TISSUE
Status: DISCONTINUED | OUTPATIENT
Start: 2025-03-19 | End: 2025-03-19

## 2025-03-19 RX ORDER — CALCIUM CARBONATE 200(500)MG
500 TABLET,CHEWABLE ORAL DAILY PRN
Status: DISCONTINUED | OUTPATIENT
Start: 2025-03-19 | End: 2025-03-26 | Stop reason: HOSPADM

## 2025-03-19 RX ORDER — MORPHINE SULFATE 4 MG/ML
2 INJECTION, SOLUTION INTRAMUSCULAR; INTRAVENOUS
Refills: 0 | Status: DISCONTINUED | OUTPATIENT
Start: 2025-03-19 | End: 2025-03-26 | Stop reason: HOSPADM

## 2025-03-19 RX ORDER — GLUCAGON 1 MG
1 KIT INJECTION
Status: DISCONTINUED | OUTPATIENT
Start: 2025-03-19 | End: 2025-03-26 | Stop reason: HOSPADM

## 2025-03-19 RX ORDER — LIDOCAINE HYDROCHLORIDE 20 MG/ML
INJECTION INTRAVENOUS
Status: DISCONTINUED | OUTPATIENT
Start: 2025-03-19 | End: 2025-03-19

## 2025-03-19 RX ORDER — PROCHLORPERAZINE EDISYLATE 5 MG/ML
5 INJECTION INTRAMUSCULAR; INTRAVENOUS EVERY 6 HOURS PRN
Status: DISCONTINUED | OUTPATIENT
Start: 2025-03-19 | End: 2025-03-26 | Stop reason: HOSPADM

## 2025-03-19 RX ORDER — MUPIROCIN 20 MG/G
OINTMENT TOPICAL
Status: DISPENSED
Start: 2025-03-19 | End: 2025-03-19

## 2025-03-19 RX ORDER — CEFAZOLIN 2 G/1
2 INJECTION, POWDER, FOR SOLUTION INTRAMUSCULAR; INTRAVENOUS ONCE
Status: COMPLETED | OUTPATIENT
Start: 2025-03-19 | End: 2025-03-19

## 2025-03-19 RX ORDER — ACETAMINOPHEN 325 MG/1
650 TABLET ORAL EVERY 6 HOURS PRN
Status: DISCONTINUED | OUTPATIENT
Start: 2025-03-19 | End: 2025-03-26 | Stop reason: HOSPADM

## 2025-03-19 RX ORDER — HYDROCODONE BITARTRATE AND ACETAMINOPHEN 10; 325 MG/1; MG/1
1 TABLET ORAL EVERY 4 HOURS PRN
Refills: 0 | Status: DISCONTINUED | OUTPATIENT
Start: 2025-03-19 | End: 2025-03-26 | Stop reason: HOSPADM

## 2025-03-19 RX ORDER — INSULIN ASPART 100 [IU]/ML
0-10 INJECTION, SOLUTION INTRAVENOUS; SUBCUTANEOUS EVERY 6 HOURS PRN
Status: DISCONTINUED | OUTPATIENT
Start: 2025-03-19 | End: 2025-03-26 | Stop reason: HOSPADM

## 2025-03-19 RX ORDER — OXYCODONE AND ACETAMINOPHEN 10; 325 MG/1; MG/1
1 TABLET ORAL EVERY 4 HOURS PRN
Refills: 0 | Status: DISCONTINUED | OUTPATIENT
Start: 2025-03-19 | End: 2025-03-26 | Stop reason: HOSPADM

## 2025-03-19 RX ORDER — HYDROCODONE BITARTRATE AND ACETAMINOPHEN 5; 325 MG/1; MG/1
1 TABLET ORAL EVERY 4 HOURS PRN
Refills: 0 | Status: DISCONTINUED | OUTPATIENT
Start: 2025-03-19 | End: 2025-03-26 | Stop reason: HOSPADM

## 2025-03-19 RX ORDER — CEFAZOLIN SODIUM 1 G/3ML
INJECTION, POWDER, FOR SOLUTION INTRAMUSCULAR; INTRAVENOUS
Status: DISCONTINUED | OUTPATIENT
Start: 2025-03-19 | End: 2025-03-19 | Stop reason: HOSPADM

## 2025-03-19 RX ORDER — HYDRALAZINE HYDROCHLORIDE 20 MG/ML
10 INJECTION INTRAMUSCULAR; INTRAVENOUS EVERY 4 HOURS PRN
Status: DISCONTINUED | OUTPATIENT
Start: 2025-03-19 | End: 2025-03-26 | Stop reason: HOSPADM

## 2025-03-19 RX ORDER — CARVEDILOL 3.12 MG/1
6.25 TABLET ORAL 2 TIMES DAILY WITH MEALS
Status: DISCONTINUED | OUTPATIENT
Start: 2025-03-19 | End: 2025-03-21

## 2025-03-19 RX ORDER — SODIUM CHLORIDE 0.9 % (FLUSH) 0.9 %
10 SYRINGE (ML) INJECTION
Status: DISCONTINUED | OUTPATIENT
Start: 2025-03-19 | End: 2025-03-26 | Stop reason: HOSPADM

## 2025-03-19 RX ORDER — ALUMINUM HYDROXIDE, MAGNESIUM HYDROXIDE, AND SIMETHICONE 1200; 120; 1200 MG/30ML; MG/30ML; MG/30ML
30 SUSPENSION ORAL EVERY 4 HOURS PRN
Status: DISCONTINUED | OUTPATIENT
Start: 2025-03-19 | End: 2025-03-26 | Stop reason: HOSPADM

## 2025-03-19 RX ORDER — GABAPENTIN 300 MG/1
300 CAPSULE ORAL 3 TIMES DAILY
Status: DISCONTINUED | OUTPATIENT
Start: 2025-03-19 | End: 2025-03-26 | Stop reason: HOSPADM

## 2025-03-19 RX ADMIN — PHENYLEPHRINE HYDROCHLORIDE 100 MCG: 10 INJECTION INTRAVENOUS at 02:03

## 2025-03-19 RX ADMIN — CEFAZOLIN 2 G: 2 INJECTION, POWDER, FOR SOLUTION INTRAMUSCULAR; INTRAVENOUS at 02:03

## 2025-03-19 RX ADMIN — CEFAZOLIN SODIUM 2 G: 2 SOLUTION INTRAVENOUS at 11:03

## 2025-03-19 RX ADMIN — PROPOFOL 125 MCG/KG/MIN: 10 INJECTION, EMULSION INTRAVENOUS at 02:03

## 2025-03-19 RX ADMIN — HYDRALAZINE HYDROCHLORIDE 10 MG: 20 INJECTION INTRAMUSCULAR; INTRAVENOUS at 11:03

## 2025-03-19 RX ADMIN — HYDROMORPHONE HYDROCHLORIDE 0.4 MG: 2 INJECTION, SOLUTION INTRAMUSCULAR; INTRAVENOUS; SUBCUTANEOUS at 05:03

## 2025-03-19 RX ADMIN — PAROXETINE HYDROCHLORIDE 20 MG: 20 TABLET, FILM COATED ORAL at 09:03

## 2025-03-19 RX ADMIN — PROPOFOL 125 MCG/KG/MIN: 10 INJECTION, EMULSION INTRAVENOUS at 04:03

## 2025-03-19 RX ADMIN — EPHEDRINE SULFATE 15 MG: 50 INJECTION INTRAVENOUS at 02:03

## 2025-03-19 RX ADMIN — SENNOSIDES AND DOCUSATE SODIUM 2 TABLET: 50; 8.6 TABLET ORAL at 09:03

## 2025-03-19 RX ADMIN — HYDROCODONE BITARTRATE AND ACETAMINOPHEN 1 TABLET: 5; 325 TABLET ORAL at 06:03

## 2025-03-19 RX ADMIN — MUPIROCIN: 20 OINTMENT TOPICAL at 09:03

## 2025-03-19 RX ADMIN — ALBUMIN (HUMAN) 100 ML: 12.5 SOLUTION INTRAVENOUS at 03:03

## 2025-03-19 RX ADMIN — DEXAMETHASONE SODIUM PHOSPHATE 4 MG: 4 INJECTION, SOLUTION INTRA-ARTICULAR; INTRALESIONAL; INTRAMUSCULAR; INTRAVENOUS; SOFT TISSUE at 02:03

## 2025-03-19 RX ADMIN — ALBUMIN (HUMAN) 100 ML: 12.5 SOLUTION INTRAVENOUS at 02:03

## 2025-03-19 RX ADMIN — MIDAZOLAM HYDROCHLORIDE 1 MG: 1 INJECTION, SOLUTION INTRAMUSCULAR; INTRAVENOUS at 01:03

## 2025-03-19 RX ADMIN — GABAPENTIN 300 MG: 300 CAPSULE ORAL at 09:03

## 2025-03-19 RX ADMIN — ONDANSETRON 4 MG: 2 INJECTION INTRAMUSCULAR; INTRAVENOUS at 05:03

## 2025-03-19 RX ADMIN — PROPOFOL 120 MG: 10 INJECTION, EMULSION INTRAVENOUS at 02:03

## 2025-03-19 RX ADMIN — SODIUM CHLORIDE: 9 INJECTION, SOLUTION INTRAVENOUS at 07:03

## 2025-03-19 RX ADMIN — IOHEXOL 100 ML: 350 INJECTION, SOLUTION INTRAVENOUS at 01:03

## 2025-03-19 RX ADMIN — EPHEDRINE SULFATE 5 MG: 50 INJECTION INTRAVENOUS at 03:03

## 2025-03-19 RX ADMIN — CARVEDILOL 6.25 MG: 3.12 TABLET, FILM COATED ORAL at 09:03

## 2025-03-19 RX ADMIN — HYDROXYZINE HYDROCHLORIDE 25 MG: 25 TABLET, FILM COATED ORAL at 09:03

## 2025-03-19 RX ADMIN — SODIUM CHLORIDE 0.05 MCG/KG/MIN: 9 INJECTION, SOLUTION INTRAVENOUS at 02:03

## 2025-03-19 RX ADMIN — GLYCOPYRROLATE 0.2 MG: 0.2 INJECTION INTRAMUSCULAR; INTRAVENOUS at 02:03

## 2025-03-19 RX ADMIN — ACETAMINOPHEN 1000 MG: 10 INJECTION, SOLUTION INTRAVENOUS at 05:03

## 2025-03-19 RX ADMIN — SODIUM CHLORIDE: 9 INJECTION, SOLUTION INTRAVENOUS at 06:03

## 2025-03-19 RX ADMIN — LIDOCAINE HYDROCHLORIDE 80 MG: 20 INJECTION INTRAVENOUS at 02:03

## 2025-03-19 RX ADMIN — PHENYLEPHRINE HYDROCHLORIDE 20 MCG/MIN: 10 INJECTION INTRAVENOUS at 02:03

## 2025-03-19 RX ADMIN — EPHEDRINE SULFATE 10 MG: 50 INJECTION INTRAVENOUS at 02:03

## 2025-03-19 RX ADMIN — FENTANYL CITRATE 100 MCG: 50 INJECTION, SOLUTION INTRAMUSCULAR; INTRAVENOUS at 02:03

## 2025-03-19 RX ADMIN — HYDROCODONE BITARTRATE AND ACETAMINOPHEN 1 TABLET: 10; 325 TABLET ORAL at 11:03

## 2025-03-19 RX ADMIN — MIDAZOLAM HYDROCHLORIDE 2 MG: 1 INJECTION, SOLUTION INTRAMUSCULAR; INTRAVENOUS at 01:03

## 2025-03-19 RX ADMIN — SUCCINYLCHOLINE CHLORIDE 120 MG: 20 INJECTION, SOLUTION INTRAMUSCULAR; INTRAVENOUS at 02:03

## 2025-03-19 RX ADMIN — ONDANSETRON 4 MG: 2 INJECTION INTRAMUSCULAR; INTRAVENOUS at 01:03

## 2025-03-19 RX ADMIN — EPHEDRINE SULFATE 5 MG: 50 INJECTION INTRAVENOUS at 05:03

## 2025-03-19 RX ADMIN — SODIUM CHLORIDE, SODIUM GLUCONATE, SODIUM ACETATE, POTASSIUM CHLORIDE AND MAGNESIUM CHLORIDE: 526; 502; 368; 37; 30 INJECTION, SOLUTION INTRAVENOUS at 02:03

## 2025-03-19 RX ADMIN — CEFAZOLIN 2 G: 2 INJECTION, POWDER, FOR SOLUTION INTRAMUSCULAR; INTRAVENOUS at 11:03

## 2025-03-19 RX ADMIN — CALCIUM CHLORIDE INJECTION 1 G: 100 INJECTION, SOLUTION INTRAVENOUS at 02:03

## 2025-03-19 NOTE — BRIEF OP NOTE
[facility] - Periop Services  Brief Operative Note    SUMMARY     Surgery Date: 3/19/2025     Surgeons and Role:     * Serge Atkins MD - Primary    Assisting Surgeon: None    Pre-op Diagnosis:  H/O laminectomy [Z98.890]    Post-op Diagnosis:  Post-Op Diagnosis Codes:     * Cervical spine pain [M54.2]    Procedure(s) (LRB):  LAMINECTOMY, SPINE, THORACIC (N/A)  LAMINECTOMY, SPINE, THORACIC, POSTERIOR APPROACH, WITH EXCISION OF SPINAL CORD LESION    T6-9 laminectomy, durotomy, and resection of intradural extramedullary tumor. Specimen obtained for pathology.     Anesthesia: General    Implants:  * No implants in log *    Operative Findings: Dictated    Estimated Blood Loss: * No values recorded between 3/19/2025  2:11 PM and 3/19/2025  5:10 PM *    Estimated Blood Loss has been documented.         Specimens:   Specimen (24h ago, onward)       Start     Ordered    03/19/25 2810  Specimen to Pathology  RELEASE UPON ORDERING        References:    Click here for ordering Quick Tip   Question:  Release to patient  Answer:  Immediate    03/19/25 8593                    [id number]

## 2025-03-19 NOTE — ED PROVIDER NOTES
Encounter Date: 3/18/2025    SCRIBE #1 NOTE: I, Александр Colin, am scribing for, and in the presence of,  Iram Fleming MD. I have scribed the following portions of the note - Other sections scribed: HPI,ROS,PE.       History     Chief Complaint   Patient presents with    Neurologic Problem     Pt arrives via [facility], EMS transfer from [facility] for mass with cord compression. PT is AAOx 4     66 y/o female with PMHx of DM, HTN, anxiety, and depression presents to ED via EMS as a transfer from [facility] for neurosurgery for a mass with spinal cord compression. Pt reports for ~1x month she has been having increased bilateral leg numbness and weakness. She denies any neck, back, or leg pain. She reports going to an outside facility on 3/14, had an MRI done, and saw a mass with spinal cord compression. She reports she was supposed to be transferred to [place], but she got anxious and left AMA. She reports the weakness and numbness to her legs was worsening, so she went to [facility], and was transferred here for Neurosurgery.     The history is provided by the patient and medical records.     Review of patient's allergies indicates:  No Known Allergies  Past Medical History:   Diagnosis Date    Anxiety     Colon polyps 11/10/2020    Depression     Diabetes mellitus     Hypertension     Wears dentures      Past Surgical History:   Procedure Laterality Date     SECTION      GALLBLADDER SURGERY       Family History   Problem Relation Name Age of Onset    Diabetes Mother      Hypertension Mother      Diabetes Father      Hypertension Father      Macular degeneration Father      No Known Problems Sister      No Known Problems Daughter      No Known Problems Maternal Aunt      No Known Problems Maternal Uncle      No Known Problems Paternal Aunt      No Known Problems Paternal Uncle      No Known Problems Maternal Grandmother      No Known Problems Maternal Grandfather      No Known Problems Paternal Grandmother       No Known Problems Paternal Grandfather      Ovarian cancer Neg Hx      BRCA 1/2 Neg Hx      Breast cancer Neg Hx       Social History[1]  Review of Systems   Musculoskeletal:  Negative for arthralgias (denies leg pain), back pain, myalgias (denies leg pain) and neck pain.   Neurological:  Positive for weakness (BLE) and numbness (BLE).       Physical Exam     Initial Vitals [03/19/25 0018]   BP Pulse Resp Temp SpO2   (!) 158/82 60 16 98.6 °F (37 °C) 95 %      MAP       --         Physical Exam    Nursing note and vitals reviewed.  Constitutional: She appears well-developed and well-nourished. She is not diaphoretic. No distress.   HENT:   Head: Normocephalic and atraumatic.   Right Ear: External ear normal.   Left Ear: External ear normal.   Nose: Nose normal.   Eyes: Conjunctivae are normal.   Neck: Neck supple.   Cardiovascular:  Normal rate.           Pulmonary/Chest: No respiratory distress.   Abdominal: She exhibits no distension.   Musculoskeletal:      Cervical back: Neck supple.     Neurological: She is alert and oriented to person, place, and time. A sensory deficit (bilateral lower extremity numbness) is present. GCS eye subscore is 4. GCS verbal subscore is 5. GCS motor subscore is 6.   Bilateral lower extremity weakness.    Skin: Skin is warm. No pallor.   Psychiatric:   Tearful and anxious.          ED Course   Procedures  Labs Reviewed   CBC WITH DIFFERENTIAL - Abnormal       Result Value    WBC 11.69 (*)     RBC 4.81      Hgb 14.9      Hct 43.6      MCV 90.6      MCH 31.0      MCHC 34.2      RDW 12.7      Platelet 303      MPV 9.1      Neut % 61.5      Lymph % 26.5      Mono % 6.8      Eos % 4.1      Basophil % 0.9      Imm Grans % 0.2      Neut # 7.20      Lymph # 3.10      Mono # 0.79      Eos # 0.48      Baso # 0.10      Imm Gran # 0.02      NRBC% 0.0     CBC W/ AUTO DIFFERENTIAL    Narrative:     The following orders were created for panel order CBC auto differential.  Procedure                                Abnormality         Status                     ---------                               -----------         ------                     CBC with Differential[[id number]]       Abnormal            Final result                 Please view results for these tests on the individual orders.   COMPREHENSIVE METABOLIC PANEL   CBC W/ AUTO DIFFERENTIAL    Narrative:     The following orders were created for panel order CBC Auto Differential.  Procedure                               Abnormality         Status                     ---------                               -----------         ------                     CBC with Differential[[id number]]                                                        Please view results for these tests on the individual orders.   COMPREHENSIVE METABOLIC PANEL   MAGNESIUM   APTT   PROTIME-INR   CBC WITH DIFFERENTIAL   TYPE & SCREEN   POCT GLUCOSE MONITORING CONTINUOUS          Imaging Results              CT Chest Abdomen Pelvis With IV Contrast (XPD) NO Oral Contrast (In process)                      Medications   insulin aspart U-100 injection 0-10 Units (has no administration in time range)   dextrose 50% injection 12.5 g (has no administration in time range)   glucagon (human recombinant) injection 1 mg (has no administration in time range)   iohexoL (OMNIPAQUE 350) injection 100 mL (100 mLs Intravenous Given 3/19/25 0106)     Medical Decision Making  66 yo female with thoracic spine mass with cord compression    MRI from 3/14   Has weakness of BLE  CT chest/abdomen pending  NPO for OR with NSGY  Admitted to hospitalist     Problems Addressed:  Mass of thoracic vertebra: acute illness or injury that poses a threat to life or bodily functions  Spinal cord compression: acute illness or injury that poses a threat to life or bodily functions    Amount and/or Complexity of Data Reviewed  External Data Reviewed: radiology.     Details: MRI 3/14/25: Mass along the dorsal aspect of  the spinal canal at the T6-7 level measuring 2.7 x 1.0 x 1.1 cm in greatest dimension displaced the cord ventrally into the right and causing severe narrowing of the central spinal canal at this level.  A contrast enhanced MRI of the thoracic spine is recommended for further assessment to evaluate the significance of this finding.     Disc herniations/protrusions posteriorly at the C5-6, C6-7 and T2-3 levels.     Disc herniation/extrusion of the T7-8 level extending posteriorly and cranially and to the left.     Probable focal area of myelomalacia in the cervical portion of the cord at the C5-6 level.    Labs: ordered. Decision-making details documented in ED Course.    Risk  Prescription drug management.  Decision regarding hospitalization.  Emergency major surgery.            Scribe Attestation:   Scribe #1: I performed the above scribed service and the documentation accurately describes the services I performed. I attest to the accuracy of the note.    Attending Attestation:           Physician Attestation for Scribe:  Physician Attestation Statement for Scribe #1: I, Iram Fleming MD, reviewed documentation, as scribed by Александр Colin in my presence, and it is both accurate and complete.             ED Course as of 03/19/25 0125   Wed Mar 19, 2025   0050 Paged Dr. Atkins, neurosurgery.  [ANA]   0055 NSGY consult: spoke with Dr Atkins who requests CT chest/abdomen, keep NPO for OR  [KM]   0057 Paged hospitalist  [ANA]      ED Course User Index  [ANA] Александр Colin  [KM] Iram Fleming MD                           Clinical Impression:  Final diagnoses:  [M48.9] Mass of thoracic vertebra  [G95.20] Spinal cord compression (Primary)          ED Disposition Condition    Admit Stable                  [1]   Social History  Tobacco Use    Smoking status: Every Day     Current packs/day: 1.00     Average packs/day: 1 pack/day for 18.0 years (18.0 ttl pk-yrs)     Types: Cigarettes   Substance Use Topics    Alcohol use:  No    Drug use: Never        Iram Fleming MD  03/19/25 0122

## 2025-03-19 NOTE — ANESTHESIA PREPROCEDURE EVALUATION
2025  Brittany Ordonez is a 65 y.o., female.    Brittany Ordonez is a 65 y.o. female who  has a past medical history of Anxiety, Colon polyps (11/10/2020), Depression, Diabetes mellitus, Hypertension, and Wears dentures.. The patient presented to [facility] on 3/19/2025      65-year-old female admitted on 3/19/25 secondary to bilateral lower extremity weakness.  Patient was recently seen at an outside facility until she had a thoracic spine mass pushing on her spinal cord.  She ended up leaving [facility] due to anxiety for upcoming transfer to a specialty facility.  She re-presented to the hospital due to continued difficulty voiding and ambulation.  MRI performed at our facility shows a lesion in his spinal cord at T7 pushing on the spine with compression from T5-T8.  Neurosurgery has already on board and plans to take for emergent decompression today.    Past Medical History:   Diagnosis Date    Anxiety     Colon polyps 11/10/2020    Depression     Diabetes mellitus     Hypertension     Wears dentures      Past Surgical History:   Procedure Laterality Date     SECTION      GALLBLADDER SURGERY       Medications Ordered Prior to Encounter[1]              Pre-op Assessment    I have reviewed the Patient Summary Reports.     I have reviewed the Nursing Notes. I have reviewed the NPO Status.   I have reviewed the Medications.     Review of Systems  Anesthesia Hx:  No problems with previous Anesthesia                Social:  Smoker       Cardiovascular:     Hypertension                                          Neurological:           Thoracic spine mass                            Endocrine:  Diabetes           Psych:   anxiety depression                Physical Exam  General: Cooperative, Alert and Oriented    Airway:  Mallampati: II   Mouth Opening: Normal  TM Distance: Normal  Tongue: Normal  Neck ROM:  Extension Decreased    Dental:  Dentures        Anesthesia Plan  Type of Anesthesia, risks & benefits discussed:    Anesthesia Type: Gen ETT  Intra-op Monitoring Plan: Standard ASA Monitors and Art Line  Post Op Pain Control Plan: multimodal analgesia  Induction:  IV  Airway Plan: Direct, Post-Induction  Informed Consent: Informed consent signed with the Patient and all parties understand the risks and agree with anesthesia plan.  All questions answered. Patient consented to blood products? Yes  ASA Score: 3  Day of Surgery Review of History & Physical: H&P Update referred to the surgeon/provider.I have interviewed and examined the patient. I have reviewed the patient's H&P dated: There are no significant changes.     Ready For Surgery From Anesthesia Perspective.     .           [1]   No current facility-administered medications on file prior to encounter.     Current Outpatient Medications on File Prior to Encounter   Medication Sig Dispense Refill    carvediloL (COREG) 6.25 MG tablet Take 6.25 mg by mouth 2 (two) times daily with meals.      gabapentin (NEURONTIN) 300 MG capsule Take 300 mg by mouth 3 (three) times daily.      metformin (GLUCOPHAGE) 1000 MG tablet Take 1,000 mg by mouth 2 (two) times daily with meals.      verapamiL (VERELAN) 120 MG C24P Take 120 mg by mouth 2 (two) times a day.      hydrOXYzine HCL (ATARAX) 25 MG tablet Take 25 mg by mouth 2 (two) times a day.      paroxetine (PAXIL) 20 MG tablet Take 20 mg by mouth every morning.      SITagliptin (JANUVIA) 100 MG Tab Take 100 mg by mouth once daily.

## 2025-03-19 NOTE — PLAN OF CARE
03/19/25 1116   Discharge Assessment   Assessment Type Discharge Planning Assessment   Confirmed/corrected address, phone number and insurance Yes   Confirmed Demographics Correct on Facesheet   Source of Information patient   Communicated YOVANA with patient/caregiver Date not available/Unable to determine   Reason For Admission spinal cord compression   People in Home spouse;sibling(s)   Do you expect to return to your current living situation? Other (see comments)  (tbd)   Do you have help at home or someone to help you manage your care at home? Yes   Who are your caregiver(s) and their phone number(s)? royal lamas, spouse, [phone]   Prior to hospitilization cognitive status: Unable to Assess   Current cognitive status: Alert/Oriented   Home Accessibility wheelchair accessible   Home Layout Able to live on 1st floor   Equipment Currently Used at Home wheelchair;rollator;walker, standard   Readmission within 30 days? No   Patient currently being followed by outpatient case management? No   Do you currently have service(s) that help you manage your care at home? No   Do you take prescription medications? Yes   Do you have prescription coverage? No   Do you have any problems affording any of your prescribed medications? No   Is the patient taking medications as prescribed? yes   Who is going to help you get home at discharge? family   How do you get to doctors appointments? family or friend will provide   Are you on dialysis? No   Do you take coumadin? No   Discharge Plan A Other  (tbd)   Discharge Plan B Other  (tbd)   DME Needed Upon Discharge  other (see comments)  (tbd)   Discharge Plan discussed with: Patient   Transition of Care Barriers None   OTHER   Name(s) of People in Home spouse     Completed assessment with pt at bedside. Introduced self and explained role as SW. Pt verb understanding to all questions asked. PCP is Shana Garcia and pharmacy is [facility]. Pt is self-pay; financial  counselor to assess. D/c dispo pending.     Vi Lacy LCSW

## 2025-03-19 NOTE — TRANSFER OF CARE
"Anesthesia Transfer of Care Note    Patient: Brittany Ordonez    Procedure(s) Performed: Procedure(s) (LRB):  LAMINECTOMY, SPINE, THORACIC (N/A)  LAMINECTOMY, SPINE, THORACIC, POSTERIOR APPROACH, WITH EXCISION OF SPINAL CORD LESION    Patient location: PACU    Anesthesia Type: general    Transport from OR: Transported from OR on room air with adequate spontaneous ventilation    Post pain: adequate analgesia    Post assessment: no apparent anesthetic complications    Post vital signs: stable    Level of consciousness: sedated    Nausea/Vomiting: no nausea/vomiting    Complications: none    Transfer of care protocol was followed      Last vitals: Visit Vitals  BP (!) 198/84 (BP Location: Right arm, Patient Position: Lying)   Pulse 63   Temp 36.8 °C (98.2 °F) (Oral)   Resp 18   Ht 5' 11" (1.803 m)   Wt 117.9 kg (259 lb 14.8 oz)   SpO2 (!) 93%   BMI 36.25 kg/m²     "

## 2025-03-19 NOTE — ANESTHESIA PROCEDURE NOTES
Intubation    Date/Time: 3/19/2025 2:25 PM    Performed by: Deion Gregory CRNA  Authorized by: Clay Calixto MD    Intubation:     Induction:  Intravenous    Intubated:  Postinduction    Mask Ventilation:  Easy mask    Attempts:  1    Attempted By:  Student    Method of Intubation:  Direct    Blade:  Corea 3    Laryngeal View Grade: Grade I - full view of cords      Difficult Airway Encountered?: No      Complications:  None    Airway Device:  Oral endotracheal tube    Airway Device Size:  7.0    Style/Cuff Inflation:  Cuffed    Inflation Amount (mL):  10    Tube secured:  22    Secured at:  The lips    Placement Verified By:  Capnometry    Complicating Factors:  None    Findings Post-Intubation:  BS equal bilateral and atraumatic/condition of teeth unchanged

## 2025-03-19 NOTE — CONSULTS
[facility]                       [address] [id number]    PATIENT NAME:       KAYLA ORDONEZ  YOB: 1960  CSN:                [id number]   MRN:                [id number]  ADMIT DATE:         03/19/2025 00:23:00  PHYSICIAN:          Serge Atkins MD                            CONSULTATION    DATE OF CONSULT:      Thank you Dr. Rich.  Kayla Ordonez is a 65-year-old lady I was asked to see   because of difficulty walking, I believe, the last several months.  In fact, she   has gone to the ER, got discharged.  She came over here with the family nearby.    Currently, she is awake, alert.  She had difficulty voiding, but she can still   do it.  Sensation decreased in the legs, below the ankle level.  She can move   her legs, but weakly about 3/5.  Reflexes elevated.  She could move arms well.    She does not have history of cancer.    I looked at the MRI that and it showsa lesion pushing on the spinal cord at T7 levels and   near the decompression from T5 to T8.  Consent was obtained.  The risk of   bleeding, infection, hemorrhage, reoperation, possibly this may not work,   possibly need further surgery discussed in detail, wants me to proceed with   surgery.        ______________________________  Serge Atkins MD    IM/AQS  DD:  03/19/2025  Time:  05:13AM  DT:  03/19/2025  Time:  05:41AM  Job #:  [id number]      CONSULTATION

## 2025-03-19 NOTE — CONSULTS
Inpatient consult to Neurosurgery  Consult performed by: René Wheatley AGACNP-BC  Consult ordered by: Iram Fleming MD          Duplicate consultation

## 2025-03-19 NOTE — ANESTHESIA PROCEDURE NOTES
Arterial    Diagnosis: Thoracic spine mass    Patient location during procedure: pre-op  Timeout: 3/19/2025 1:22 PM  Procedure end time: 3/19/2025 1:33 PM    Staffing  Authorizing Provider: Clay Calixto MD  Performing Provider: Clay Calixto MD    Staffing  Performed by: Clay Calixto MD  Authorized by: Clay Calixto MD    Anesthesiologist was present at the time of the procedure.    Preanesthetic Checklist  Completed: patient identified, IV checked, site marked, risks and benefits discussed, surgical consent, monitors and equipment checked, pre-op evaluation, timeout performed and anesthesia consent givenArterial  Skin Prep: chlorhexidine gluconate  Local Infiltration: lidocaine  Orientation: left  Location: radial    Catheter Size: 20 G  Catheter placement by Ultrasound guidance. Heme positive aspiration all ports.   Vessel Caliber: small, patent, compressibility normal  Needle advanced into vessel with real time Ultrasound guidance.Insertion Attempts: 1  Assessment  Dressing: secured with tape and tegaderm  Patient: Tolerated well

## 2025-03-19 NOTE — PT/OT/SLP PROGRESS
Physical Therapy      Patient Name:  Brittany Ordonez   MRN:  [id number]    PT eval orders received. Patient scheduled to go for laminectomy today. PT will f/u tomorrow for eval.    3/19/2025

## 2025-03-19 NOTE — H&P
[facility] Hospital Medicine History & Physical Examination       Patient Name: Brittany Ordonez  MRN: [id number]  Patient Class: IP- Inpatient   Admission Date: 3/19/2025   Admitting Physician:  Service   Attending Physician: Jd Rich MD  Primary Care Provider: Nadya Prado NP  Face-to-Face encounter date: 03/19/2025  Code Status:Code Status Discussion Note   Chief Complaint: Neurologic Problem (Pt arrives via [facility], EMS transfer from [facility] for mass with cord compression. PT is AAOx 4)         Patient information was obtained from patient, patient's family, past medical records and ER records.     HISTORY OF PRESENT ILLNESS:   Brittany Ordonez is a 65 y.o. female who  has a past medical history of Anxiety, Colon polyps (11/10/2020), Depression, Diabetes mellitus, Hypertension, and Wears dentures.. The patient presented to [facility] on 3/19/2025     65-year-old female admitted on 3/19/25 secondary to bilateral lower extremity weakness.  Patient was recently seen at an outside facility until she had a thoracic spine mass pushing on her spinal cord.  She ended up leaving [place] due to anxiety for upcoming transfer to a specialty facility.  She re-presented to the hospital due to continued difficulty voiding and ambulation.  MRI performed at our facility shows a lesion in his spinal cord at T7 pushing on the spine with compression from T5-T8.  Neurosurgery has already on board and plans to take for emergent decompression today.    Patient has a past medical history of depression, hypertension, type 2 diabetes mellitus, and anxiety.  We discussed her home medications and will resume them postoperatively.  She also seems to have some neuropathic type pain.  Will further evaluate postoperatively.  Her vital signs stable.  Labs okay on admission.  PAST MEDICAL HISTORY:     Past Medical History:   Diagnosis Date    Anxiety     Colon polyps 11/10/2020    Depression     Diabetes  mellitus     Hypertension     Wears dentures        PAST SURGICAL HISTORY:     Past Surgical History:   Procedure Laterality Date     SECTION      GALLBLADDER SURGERY         ALLERGIES:   Patient has no known allergies.    FAMILY HISTORY:   Reviewed and negative    SOCIAL HISTORY:     Social History     Tobacco Use    Smoking status: Every Day     Current packs/day: 1.00     Average packs/day: 1 pack/day for 18.0 years (18.0 ttl pk-yrs)     Types: Cigarettes    Smokeless tobacco: Not on file   Substance Use Topics    Alcohol use: No        HOME MEDICATIONS:     Prior to Admission medications    Medication Sig Start Date End Date Taking? Authorizing Provider   carvediloL (COREG) 6.25 MG tablet Take 6.25 mg by mouth 2 (two) times daily with meals.   Yes Provider, Historical   gabapentin (NEURONTIN) 300 MG capsule Take 300 mg by mouth 3 (three) times daily.   Yes Provider, Historical   metformin (GLUCOPHAGE) 1000 MG tablet Take 1,000 mg by mouth 2 (two) times daily with meals.   Yes Provider, Historical   verapamiL (VERELAN) 120 MG C24P Take 120 mg by mouth 2 (two) times a day.   Yes Provider, Historical   hydrOXYzine HCL (ATARAX) 25 MG tablet Take 25 mg by mouth 2 (two) times a day.    Provider, Historical   paroxetine (PAXIL) 20 MG tablet Take 20 mg by mouth every morning.    Provider, Historical   SITagliptin (JANUVIA) 100 MG Tab Take 100 mg by mouth once daily.    Provider, Historical       REVIEW OF SYSTEMS:   Except as documented, all other systems reviewed and negative     PHYSICAL EXAM:     VITAL SIGNS: 24 HRS MIN & MAX LAST   Temp  Min: 98 °F (36.7 °C)  Max: 98.6 °F (37 °C) 98 °F (36.7 °C)   BP  Min: 142/71  Max: 181/82 (!) 181/82   Pulse  Min: 60  Max: 64  62   Resp  Min: 15  Max: 18 18   SpO2  Min: 94 %  Max: 95 % (!) 94 %       General appearance: Well-developed, well-nourished female in no apparent distress.  HENT: Atraumatic head. Moist mucous membranes of oral cavity.  Eyes: Normal extraocular  movements.   Neck: Supple.   Lungs: Clear to auscultation bilaterally. No wheezing present.   Heart: Regular rate and rhythm. S1 and S2 present with no murmurs/gallop/rub. No pedal edema. No JVD present.   Abdomen: Soft, non-distended, non-tender. No rebound tenderness/guarding. Bowel sounds are normal.   Extremities: No cyanosis, clubbing, or edema.  Skin: No Rash.   Neuro: Motor and sensory exams grossly intact. Good tone.  3/5 lower extremity strength bilaterally Psych/mental status: Appropriate mood and affect. Responds appropriately to questions.     LABS AND IMAGING:     Recent Labs   Lab 03/14/25  1139 03/19/25  0056   WBC 12.01 11.69*   RBC 4.88 4.81   HGB 14.9 14.9   HCT 45.4 43.6   MCV 93 90.6   MCH 30.5 31.0   MCHC 32.8 34.2   RDW 12.6 12.7    303   MPV 8.9* 9.1   GRAN 69.5  8.3*  --    LYMPH 19.6  2.4  --    MONO 6.2  0.8  --    BASO 0.11  --    NRBC 0  --        Recent Labs   Lab 03/14/25  1139 03/19/25  0056    143   K 5.5* 4.2    109*   CO2 28 22*   ANIONGAP 11  --    BUN 13 12.4   CREATININE 0.7 0.60   *  --    CALCIUM 10.1 9.1   ALBUMIN 3.7 3.6   PROT 7.2  --    ALKPHOS 78 82   ALT 16 15   AST 18 18   BILITOT 0.8 0.8       Microbiology Results (last 7 days)       ** No results found for the last 168 hours. **             CT Chest Abdomen Pelvis With IV Contrast (XPD) NO Oral Contrast  Narrative: Technique: CT Scan of the chest abdomen and pelvis was performed with intravenous contrast with axial as well as sagittal and, coronal images.    Dosage Information: Automated Exposure Control was utilized.    Comparison: Comparison is with study dated June 29, 2023.    Clinical History: Cancer screening.    Findings:    Soft Tissues: Mild gynecomastia.    Neck: 2cm rim calcified lesion left thyroid lobe likely benign.    Mediastinum: The mediastinal structures are within normal limits.    Heart: Coronary artery calcification is seen.    Aorta: Mild aortic calcification is seen  in the arch and    Lungs: There is mild non specific dependent change at the lung bases. There is a 1.9 x 1.1 cm pulmonary cyst in the anterior segment of the left upper lobe.    Pleura: No effusions or pneumothorax are identified.    Bony Structures:    Spine: Mild spondylotic changes are seen in the thoracic spine.    Abdomen:    Abdominal Wall: There is a small umbilical hernia which contains mesenteric fat.    Liver: The liver appears unremarkable.    Biliary System: No intrahepatic or extrahepatic biliary duct dilatation is seen.    Gallbladder: The gallbladder is not identified. Correlate with surgical history.    Pancreas: The pancreas appears unremarkable.    Spleen: The spleen appears unremarkable.    Adrenals: The adrenal glands appear unremarkable.    Kidneys: The kidneys appear unremarkable with no stones cysts masses or hydronephrosis with IV contrast decreasing sensitivity and specificity for stones.    Aorta: There is moderate calcification of the abdominal aorta and its branches.    IVC: Unremarkable.    Bowel:    Esophagus: The visualized distal esophagus appears unremarkable.    Stomach: The stomach appears unremarkable.    Duodenum: Unremarkable appearing duodenum.    Small Bowel: The small bowel appears unremarkable.    Appendix: The appendix is not identified but no inflammatory changes are seen in the right lower quadrant to suggest appendicitis.    Peritoneum: No intraperitoneal free air or ascites is seen.    Pelvis:    Bladder: The bladder appears unremarkable.    Female:    Uterus: The uterus appears unremarkable.    Ovaries: The ovaries appear unremarkable.    Bony structures:    Dorsal Spine: There is mild spondylosis of the visualized dorsal spine.    Bony Pelvis: Mild degenerative change of the bilateral hips.  Impression: Impression:    1. 2cm rim calcified lesion left thyroid lobe likely benign.    2. No acute intrathoracic pathology identified. No acute intraabdominal or pelvic solid  organ or bowel pathology identified. No suspicious mass lesion appreciated. Details and other findings as discussed above.    No significant discrepancy with overnight report.    Electronically signed by: Juanjose Farah  Date:    03/19/2025  Time:    06:20      _____________________________________  INPATIENT LIST OF MEDICATIONS   Current Medications[1]      Scheduled Meds:   mupirocin   Nasal BID     Continuous Infusions:  PRN Meds:.  Current Facility-Administered Medications:     acetaminophen, 1,000 mg, Oral, Q6H PRN    aluminum-magnesium hydroxide-simethicone, 30 mL, Oral, QID PRN    bisacodyL, 10 mg, Rectal, Daily PRN    dextrose 50%, 12.5 g, Intravenous, PRN    glucagon (human recombinant), 1 mg, Intramuscular, PRN    hydrALAZINE, 10 mg, Intravenous, Q4H PRN    insulin aspart U-100, 0-10 Units, Subcutaneous, Q6H PRN    melatonin, 6 mg, Oral, Nightly PRN    naloxone, 0.02 mg, Intravenous, PRN    ondansetron, 4 mg, Intravenous, Q4H PRN    prochlorperazine, 5 mg, Intravenous, Q6H PRN    senna-docusate 8.6-50 mg, 1 tablet, Oral, BID PRN    sodium chloride 0.9%, 10 mL, Intravenous, PRN      VTE Prophylaxis: will be placed on appropriate DVT prophylaxis and will be advised to be as mobile as possible and sit in a chair as tolerated  _____________________________________    ASSESSMENT & PLAN:   T7 spinal mass with paraplegia  Type 2 diabetes mellitus   Essential hypertension   Anxiety/depression     I discussed the case with Neurosurgery.  Plans to take to the OR today.    Will follow up postoperatively and resume home medications   Hold oral antihyperglycemics while recovering.  Will place on sliding scale   Monitor blood pressure closely   Will need PT OT evaluations postoperatively.            Jd Rich MD  6:24 AM 03/19/2025    Screening for Social Drivers for health:  Patient screened for food insecurity, housing instability, transportation needs, utility difficulties, and interpersonal safety (select all that  apply as identified as concern)  []Housing or Food  []Transportation Needs  []Utility Difficulties  []Interpersonal safety  [x]None             [1]   Current Facility-Administered Medications:     acetaminophen tablet 1,000 mg, 1,000 mg, Oral, Q6H PRN, Briana Blanca, SHITALP    aluminum-magnesium hydroxide-simethicone 200-200-20 mg/5 mL suspension 30 mL, 30 mL, Oral, QID PRN, Briana Blanca, FNP    bisacodyL suppository 10 mg, 10 mg, Rectal, Daily PRN, Briana Blanca, FNP    dextrose 50% injection 12.5 g, 12.5 g, Intravenous, PRN, Briana Blanca, FNP    glucagon (human recombinant) injection 1 mg, 1 mg, Intramuscular, PRN, Briana Blanca, FNP    hydrALAZINE injection 10 mg, 10 mg, Intravenous, Q4H PRN, Briana Blanca, FNP    insulin aspart U-100 injection 0-10 Units, 0-10 Units, Subcutaneous, Q6H PRN, Briana Blanca, SHITALP    melatonin tablet 6 mg, 6 mg, Oral, Nightly PRN, Briana Blanca, FNP    mupirocin 2 % ointment, , Nasal, BID, Jd Rich MD    naloxone 0.4 mg/mL injection 0.02 mg, 0.02 mg, Intravenous, PRN, Briana Blanca, FNP    ondansetron injection 4 mg, 4 mg, Intravenous, Q4H PRN, Briana Blanca, FNP    prochlorperazine injection Soln 5 mg, 5 mg, Intravenous, Q6H PRN, Briana Blanca, FNP    senna-docusate 8.6-50 mg per tablet 1 tablet, 1 tablet, Oral, BID PRN, Briana Blanca, SHITALP    sodium chloride 0.9% flush 10 mL, 10 mL, Intravenous, PRN, Briana Blanca, FNP

## 2025-03-20 LAB
ANION GAP SERPL CALC-SCNC: 7 MEQ/L
BASOPHILS # BLD AUTO: 0.07 X10(3)/MCL
BASOPHILS NFR BLD AUTO: 0.6 %
BUN SERPL-MCNC: 13.1 MG/DL (ref 9.8–20.1)
CALCIUM SERPL-MCNC: 8.7 MG/DL (ref 8.4–10.2)
CEA SERPL-MCNC: 5.33 NG/ML (ref 0–3)
CHLORIDE SERPL-SCNC: 106 MMOL/L (ref 98–107)
CO2 SERPL-SCNC: 26 MMOL/L (ref 23–31)
CREAT SERPL-MCNC: 0.62 MG/DL (ref 0.55–1.02)
CREAT/UREA NIT SERPL: 21
EOSINOPHIL # BLD AUTO: 0.38 X10(3)/MCL (ref 0–0.9)
EOSINOPHIL NFR BLD AUTO: 3.4 %
ERYTHROCYTE [DISTWIDTH] IN BLOOD BY AUTOMATED COUNT: 12.8 % (ref 11.5–17)
GFR SERPLBLD CREATININE-BSD FMLA CKD-EPI: >60 ML/MIN/1.73/M2
GLUCOSE SERPL-MCNC: 123 MG/DL (ref 82–115)
HCT VFR BLD AUTO: 38.9 % (ref 37–47)
HGB BLD-MCNC: 12.8 G/DL (ref 12–16)
IMM GRANULOCYTES # BLD AUTO: 0.03 X10(3)/MCL (ref 0–0.04)
IMM GRANULOCYTES NFR BLD AUTO: 0.3 %
LYMPHOCYTES # BLD AUTO: 2.24 X10(3)/MCL (ref 0.6–4.6)
LYMPHOCYTES NFR BLD AUTO: 20.3 %
MCH RBC QN AUTO: 30.8 PG (ref 27–31)
MCHC RBC AUTO-ENTMCNC: 32.9 G/DL (ref 33–36)
MCV RBC AUTO: 93.5 FL (ref 80–94)
MONOCYTES # BLD AUTO: 0.75 X10(3)/MCL (ref 0.1–1.3)
MONOCYTES NFR BLD AUTO: 6.8 %
NEUTROPHILS # BLD AUTO: 7.57 X10(3)/MCL (ref 2.1–9.2)
NEUTROPHILS NFR BLD AUTO: 68.6 %
NRBC BLD AUTO-RTO: 0 %
PLATELET # BLD AUTO: 238 X10(3)/MCL (ref 130–400)
PMV BLD AUTO: 9 FL (ref 7.4–10.4)
POCT GLUCOSE: 138 MG/DL (ref 70–110)
POCT GLUCOSE: 139 MG/DL (ref 70–110)
POCT GLUCOSE: 153 MG/DL (ref 70–110)
POCT GLUCOSE: 163 MG/DL (ref 70–110)
POTASSIUM SERPL-SCNC: 3.8 MMOL/L (ref 3.5–5.1)
RBC # BLD AUTO: 4.16 X10(6)/MCL (ref 4.2–5.4)
SODIUM SERPL-SCNC: 139 MMOL/L (ref 136–145)
WBC # BLD AUTO: 11.04 X10(3)/MCL (ref 4.5–11.5)

## 2025-03-20 PROCEDURE — 63600175 PHARM REV CODE 636 W HCPCS

## 2025-03-20 PROCEDURE — 85025 COMPLETE CBC W/AUTO DIFF WBC: CPT | Performed by: HOSPITALIST

## 2025-03-20 PROCEDURE — 25000003 PHARM REV CODE 250: Performed by: INTERNAL MEDICINE

## 2025-03-20 PROCEDURE — 80048 BASIC METABOLIC PNL TOTAL CA: CPT | Performed by: HOSPITALIST

## 2025-03-20 PROCEDURE — 25000003 PHARM REV CODE 250

## 2025-03-20 PROCEDURE — 11000001 HC ACUTE MED/SURG PRIVATE ROOM

## 2025-03-20 PROCEDURE — 21400001 HC TELEMETRY ROOM

## 2025-03-20 PROCEDURE — 36415 COLL VENOUS BLD VENIPUNCTURE: CPT | Performed by: HOSPITALIST

## 2025-03-20 PROCEDURE — 82378 CARCINOEMBRYONIC ANTIGEN: CPT | Performed by: INTERNAL MEDICINE

## 2025-03-20 PROCEDURE — 97162 PT EVAL MOD COMPLEX 30 MIN: CPT

## 2025-03-20 PROCEDURE — 97167 OT EVAL HIGH COMPLEX 60 MIN: CPT

## 2025-03-20 PROCEDURE — 25000003 PHARM REV CODE 250: Performed by: HOSPITALIST

## 2025-03-20 PROCEDURE — 27000221 HC OXYGEN, UP TO 24 HOURS

## 2025-03-20 RX ORDER — PAROXETINE HYDROCHLORIDE 20 MG/1
20 TABLET, FILM COATED ORAL DAILY
Status: DISCONTINUED | OUTPATIENT
Start: 2025-03-20 | End: 2025-03-26 | Stop reason: HOSPADM

## 2025-03-20 RX ADMIN — SENNOSIDES AND DOCUSATE SODIUM 2 TABLET: 50; 8.6 TABLET ORAL at 10:03

## 2025-03-20 RX ADMIN — HYDROXYZINE HYDROCHLORIDE 25 MG: 25 TABLET, FILM COATED ORAL at 10:03

## 2025-03-20 RX ADMIN — GABAPENTIN 300 MG: 300 CAPSULE ORAL at 04:03

## 2025-03-20 RX ADMIN — HYDROCODONE BITARTRATE AND ACETAMINOPHEN 1 TABLET: 10; 325 TABLET ORAL at 05:03

## 2025-03-20 RX ADMIN — HYDROCODONE BITARTRATE AND ACETAMINOPHEN 1 TABLET: 10; 325 TABLET ORAL at 07:03

## 2025-03-20 RX ADMIN — CARVEDILOL 6.25 MG: 3.12 TABLET, FILM COATED ORAL at 07:03

## 2025-03-20 RX ADMIN — GABAPENTIN 300 MG: 300 CAPSULE ORAL at 07:03

## 2025-03-20 RX ADMIN — CEFAZOLIN SODIUM 2 G: 2 SOLUTION INTRAVENOUS at 04:03

## 2025-03-20 RX ADMIN — CARVEDILOL 6.25 MG: 3.12 TABLET, FILM COATED ORAL at 04:03

## 2025-03-20 RX ADMIN — CEFAZOLIN SODIUM 2 G: 2 SOLUTION INTRAVENOUS at 07:03

## 2025-03-20 RX ADMIN — PAROXETINE HYDROCHLORIDE 20 MG: 20 TABLET, FILM COATED ORAL at 07:03

## 2025-03-20 RX ADMIN — HYDROCODONE BITARTRATE AND ACETAMINOPHEN 1 TABLET: 10; 325 TABLET ORAL at 10:03

## 2025-03-20 RX ADMIN — HYDROXYZINE HYDROCHLORIDE 25 MG: 25 TABLET, FILM COATED ORAL at 07:03

## 2025-03-20 RX ADMIN — MUPIROCIN: 20 OINTMENT TOPICAL at 10:03

## 2025-03-20 RX ADMIN — HYDROCODONE BITARTRATE AND ACETAMINOPHEN 1 TABLET: 10; 325 TABLET ORAL at 12:03

## 2025-03-20 RX ADMIN — GABAPENTIN 300 MG: 300 CAPSULE ORAL at 10:03

## 2025-03-20 RX ADMIN — HYDROCODONE BITARTRATE AND ACETAMINOPHEN 1 TABLET: 10; 325 TABLET ORAL at 03:03

## 2025-03-20 RX ADMIN — SENNOSIDES AND DOCUSATE SODIUM 2 TABLET: 50; 8.6 TABLET ORAL at 07:03

## 2025-03-20 NOTE — PROGRESS NOTES
No new issues.  Feels better.  Moves legs more.  LUIS working.  Therapy/ambulation.  LUIS to half thumb print suction only.  Thank you.

## 2025-03-20 NOTE — OP NOTE
[facility]                       [address] [id number]    PATIENT NAME:      KAYLA DAY  YOB: 1960  CSN:               [id number]  MRN:               [id number]  ADMIT DATE:        03/19/2025 00:23:00  PHYSICIAN:         Serge Atkins MD                          OPERATIVE REPORT      DATE OF SURGERY:    03/19/2025 00:00:00    SURGEON:  Serge Atkins MD    ASSISTANT:  Kristian Gutierrez.    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    PROCEDURE:  Open decompression of T6, T7, T8.  Intradural exploration and   removal of the meningioma, almost in gross total resection and subsequent   closure using microscope and dissection.  Ultrasound was used to help to   localize and also used the microscope.  There were no issues, no complications.    INDICATION:  This is a 65-year-old female with difficulty walking from outside   hospital, admitted.  MRI shows severe compression of the thoracic spine with   pressure on the spinal cord.  Now here for intradural removal of the tumor,   diagnosis, and relieve of the pressure.  Consent and risks were discussed.    Risks of bleeding, infection, weakness, CSF leak, reoperation, hemorrhage   discussed in detail.  They want me to proceed with surgery.  She understands the   high risks of this kind of surgery.    OPERATIVE PROCEDURE:  The patient was brought to the operating room, intubated,   Durant placed, head put in pins.  Monitors were attached.  There were very poor   signals from the leg.  Subsequently, the thoracic area was prepped and draped,   and x-rayed.  Opened up the  T6, T7, T8, T9 area.  Put the retractors, identified   the level.  Did bone removal from T6, T7.  Getting more and more bone removal   until the pressure was off the bone completely.  We did ultrasound to see   exactly where the tumor was intradural.  We opened the dura and then we entered,   and we could see it was a meningioma.  We sent frozen.  Some of it was   calcified, requiring biting to remove the tumor and I was entirely able to fold   it from the spinal cord nerve root and taken out.  There was some scraping on   the left side where more tumor was scraped off.  With a good amount of tumor   removed, we irrigated the wound multiple times.  Hemostasis obtained with   FloSeal and Avitene.  Dura was closed with 6-0 Prolene in a running fashion.  We   put DuraGen fibrin glue over it and we put a drain, and closed in multiple   layers of 0 Vicryl, 3-0 Vicryl running subcu.  There was no change in   monitoring.  The patient was then turned supine and taken out of pins.        ______________________________  MD LI Lockett/JOHNIE  DD:  03/19/2025  Time:  05:08PM  DT:  03/20/2025  Time:  01:08AM  Job #:  [id number]      OPERATIVE REPORT

## 2025-03-20 NOTE — PROGRESS NOTES
[facility] Hospital Medicine Progress Note        Chief Complaint: Inpatient Follow-up    HPI:     65-year-old female with significant history of colon polyp, anxiety/depression, type 2 diabetes mellitus, HTN, presented to the ED with complaints of bilateral lower extremity weakness.  Patient was recently evaluated in outlying facility and was found to have a thoracic spine mass with concern for cord compression, recommended admission but ended up leaving against medical advice.  Patient continued with weakness and difficulty voiding and therefore she decided to come back to the ED. MRI revealed mass along T6-T7 causing severe spinal canal narrowing, cervical disc herniation/protrusion, disc herniation at T7-T8, lumbar spinal canal narrowing at L4-L5, disc herniation L3-L4, L1-L2.  Patient admitted to hospital medicine services, neurosurgery services consulted.  Pelvis with no focus of malignancy.  Patient was taken to OR by Neurosurgery on 03/19 and she underwent thoracic spine laminectomy with excision of spinal cord lesion, awaiting path      Interval Hx:   Patient seen at bedside, hemodynamically stable, reports she is somewhat exhausted after therapy, able to follow commands, lower extremity weakness persist, no new complaints, no acute events overnight    Objective/physical exam:  General: In no acute distress, afebrile  Chest: Clear to auscultation bilaterally  Heart: S1, S2, no appreciable murmur  Abdomen: Soft, nontender, BS +  MSK: Warm, no lower extremity edema, no clubbing or cyanosis  Neurologic: Alert and oriented x4, paraplegia  VITAL SIGNS: 24 HRS MIN & MAX LAST   Temp  Min: 96.8 °F (36 °C)  Max: 98.3 °F (36.8 °C) 97.7 °F (36.5 °C)   BP  Min: 96/47  Max: 198/84 (!) 153/77   Pulse  Min: 50  Max: 71  64   Resp  Min: 13  Max: 18 18   SpO2  Min: 91 %  Max: 98 % (!) 92 %       Recent Labs   Lab 03/14/25  1139 03/19/25  0056 03/20/25  0504   WBC 12.01   < > 11.04   RBC 4.88    < > 4.16*   HGB 14.9   < > 12.8   HCT 45.4   < > 38.9   MCV 93   < > 93.5   MCH 30.5   < > 30.8   MCHC 32.8   < > 32.9*   RDW 12.6   < > 12.8      < > 238   MPV 8.9*   < > 9.0   GRAN 69.5  8.3*  --   --    LYMPH 19.6  2.4  --   --    MONO 6.2  0.8  --   --    BASO 0.11  --   --    NRBC 0  --   --     < > = values in this interval not displayed.         Recent Labs   Lab 03/14/25  1139 03/19/25  0056 03/19/25  0554 03/20/25  0504      < > 142 139   K 5.5*   < > 3.5 3.8      < > 107 106   CO2 28   < > 24 26   ANIONGAP 11  --   --   --    BUN 13   < > 11.6 13.1   CREATININE 0.7   < > 0.59 0.62   *  --   --   --    CALCIUM 10.1   < > 9.1 8.7   MG  --   --  1.60  --    ALBUMIN 3.7   < > 3.4  --    PROT 7.2  --   --   --    ALKPHOS 78   < > 76  --    ALT 16   < > 15  --    AST 18   < > 14  --    BILITOT 0.8   < > 1.0  --     < > = values in this interval not displayed.          Microbiology Results (last 7 days)       ** No results found for the last 168 hours. **             Scheduled Med:   bisacodyL  10 mg Rectal Daily    carvediloL  6.25 mg Oral BID WM    ceFAZolin (Ancef) IV (PEDS and ADULTS)  2 g Intravenous Q8H    gabapentin  300 mg Oral TID    hydrOXYzine HCL  25 mg Oral BID    mupirocin   Nasal BID    paroxetine  20 mg Oral Daily    senna-docusate 8.6-50 mg  2 tablet Oral BID          Assessment/Plan:    Thoracic spinal mass along T6-T7 causing severe spinal canal narrowing status post laminectomy, excision of mass, postop day 1.  Paraplegia likely secondary to above   Multilevel cervical disc herniation/multilevel lumbar spinal canal stenosis with disc herniation  History of colon polyp   History of anxiety/depression   Type 2 diabetes mellitus -stable  Essential HTN-stable   Prophylaxis      Postop recommendations per Neurosurgery who is closely following the patient   On Ancef for surgical prophylaxis  Continue therapy services   Await path  CT chest, abdomen, pelvis with no focus  of malignancy   I will obtain CEA given her history of colon cancer   CBG stable   Continue sliding scale  Continue home meds-Coreg, gabapentin, Vistaril, paroxetine   Continue bowel regimen   DVT prophylaxis-bilateral SCDs, chemical prophylaxis once cleared by Neurosurgery        Wen Romo MD   03/20/2025

## 2025-03-20 NOTE — PT/OT/SLP EVAL
"Occupational Therapy  Evaluation    Name: Brittany Ordonez  MRN: [id number]  Admitting Diagnosis: bilateral LE weakness   Recent Surgery: Procedure(s) (LRB):  LAMINECTOMY, SPINE, THORACIC (N/A)  LAMINECTOMY, SPINE, THORACIC, POSTERIOR APPROACH, WITH EXCISION OF SPINAL CORD LESION 1 Day Post-Op    Recommendations:     Discharge therapy intensity: High Intensity Therapy   Discharge Equipment Recommendations:  bath bench, hip kit  Barriers to discharge:       Assessment:     Brittany Ordonez is a 65 y.o. female with a medical diagnosis of bilateral LE weakness, lesion T7 s/p laminectomy T6-9.  She presents with the following performance deficits affecting function: weakness, impaired endurance, impaired self care skills, impaired functional mobility, gait instability, impaired balance, pain.   Pt required max A log roll and bed mobility to EOB, mostly 2/2 fear and pain. Pt required SBA sitting balance at EOB. Sit <> stand with mod A with RW with 5 side steps along EOB and cues to progress BLE and RW. Recommend high intensity at this time.     Rehab Prognosis: Good; patient would benefit from acute skilled OT services to address these deficits and reach maximum level of function.       Plan:     Patient to be seen 6 x/week to address the above listed problems via self-care/home management, therapeutic activities, therapeutic exercises  Plan of Care Expires: 04/18/25  Plan of Care Reviewed with: patient, family    Subjective     Chief Complaint: "I'm scared to fall"   Patient/Family Comments/goals: to get better     Occupational Profile:  Living Environment: pt lives with  and brother, ramp entrance, tub shower   Previous level of function: lately has required assist with dressing and ambulation, using RW and w/c   Roles and Routines: pt has not driven in months and does not work   Equipment Used at Home: wheelchair, walker, rolling, shower chair  Assistance upon Discharge:     Pain/Comfort:  Pain Rating 1: " 6/10  Location - Side 1: Bilateral  Location - Orientation 1: medial  Location 1: back  Pain Addressed 1: Reposition, Distraction    Patients cultural, spiritual, Sikh conflicts given the current situation:      Objective:     OT communicated with nrsg prior to session.      Patient was found HOB elevated with LUIS drain upon OT entry to room.    General Precautions: Standard, fall  Orthopedic Precautions: spinal precautions  Braces:        Bed Mobility:    Patient completed Rolling/Turning to Left with  maximal assistance  Patient completed Supine to Sit with maximal assistance  Patient completed Sit to Supine with maximal assistance    Functional Mobility/Transfers:  Patient completed Sit <> Stand Transfer with moderate assistance  with  rolling walker   Functional Mobility: mod A with RW for 5 side steps along EOB; limited 2/2 fear, weakness, and increased pain     Activities of Daily Living:  Lower Body Dressing: maximal assistance    Toileting: maximal assistance      AMPA 6 Click ADL:  AMPA Total Score: 16    Functional Cognition:  Affect: Anxious    Upper Extremity Function:  Right Upper Extremity:   WFL    Left Upper Extremity:  WFL    Balance:   Static standing balance:mod A     Therapeutic Positioning  Risk for acquired pressure injuries is decreased due to ability to get to BSC/toilet with assist.    OT interventions performed during the course of today's session:   Therapeutic positioning was provided at the conclusion of session to offload all bony prominences for the prevention and/or reduction of pressure injuries      OT recommendations for therapeutic positioning throughout hospitalization:   Follow [facility] Pressure Injury Prevention Protocol      Patient Education:  Patient provided with verbal education education regarding OT role/goals/POC, post op precautions, fall prevention, safety awareness, Discharge/DME recommendations, and pressure ulcer prevention.  Understanding was verbalized.      Patient left right sidelying with all lines intact, call button in reach, wedge under L side, pressure relief boots, and family present.    GOALS:   Multidisciplinary Problems       Occupational Therapy Goals          Problem: Occupational Therapy    Goal Priority Disciplines Outcome Interventions   Occupational Therapy Goal     OT, PT/OT Progressing    Description: Goals to be met by: in 1 month      Patient will increase functional independence with ADLs by performing:    LE Dressing with Modified [place].  Grooming while standing with Modified [place].  Toileting from bedside commode with Modified [place] for hygiene and clothing management.   Toilet transfer to bedside commode with Modified [place].                         History:     Past Medical History:   Diagnosis Date    Anxiety     Colon polyps 11/10/2020    Depression     Diabetes mellitus     Hypertension     Wears dentures          Past Surgical History:   Procedure Laterality Date     SECTION      GALLBLADDER SURGERY      LAMINECTOMY OF THORACIC SPINE WITH SURGICAL REMOVAL OF INTRASPINAL LESION  3/19/2025    Procedure: LAMINECTOMY, SPINE, THORACIC, POSTERIOR APPROACH, WITH EXCISION OF SPINAL CORD LESION;  Surgeon: Serge Atkins MD;  [place] [facility] [place]  Service: Neurosurgery;;  thoracic tumor removal    THORACIC LAMINECTOMY N/A 3/19/2025    Procedure: LAMINECTOMY, SPINE, THORACIC;  Surgeon: Serge Atkins MD;  Location: [facility] OR;  Service: Neurosurgery;  Laterality: N/A;  T5-T8 laminectomy       Time Tracking:     OT Date of Treatment:    OT Start Time: 958  OT Stop Time:   OT Total Time (min): 23 min    Billable Minutes:Evaluation High Complexity     3/20/2025

## 2025-03-20 NOTE — PT/OT/SLP EVAL
Physical Therapy Evaluation    Patient Name:  Brittany Ordonez   MRN:  [id number]    Recommendations:     Discharge therapy intensity: High Intensity Therapy   Discharge Equipment Recommendations: bath bench, hip kit   Barriers to discharge: Impaired mobility and Ongoing medical needs    Assessment:     Brittany Ordonez is a 65 y.o. female admitted with a spinal mass with cord compression. S/p laminectomy @ T6, T7, and T8. Prior to admit, patient lived with spouse and brother in a [facility] with a ramp. At baseline, she is independent and ambulates with a rollator & RW.   She presents with the following impairments/functional limitations: weakness, impaired endurance, impaired self care skills, impaired functional mobility, gait instability, impaired balance, decreased safety awareness, pain. She required MAX A for bed mobility. MOD A of 2 for sit<>stand. Able to take 2 side-steps with RW & MOD A of 2. Very shaky while standing. Limited by severe pain. Patient to benefit from skilled PT to address deficits, improve functional mobility, and progress towards functional independence. Recommending high intensity therapy at discharge. Progress as tolerated.    Rehab Prognosis: Good; patient would benefit from acute skilled PT services to address these deficits and reach maximum level of function.    Recent Surgery: Procedure(s) (LRB):  LAMINECTOMY, SPINE, THORACIC (N/A)  LAMINECTOMY, SPINE, THORACIC, POSTERIOR APPROACH, WITH EXCISION OF SPINAL CORD LESION 1 Day Post-Op    Plan:     During this hospitalization, patient would benefit from acute PT services 6 x/week to address the identified rehab impairments via gait training, therapeutic activities, therapeutic exercises, neuromuscular re-education and progress toward the following goals:    Plan of Care Expires:  04/20/25    Subjective     Chief Complaint: pain  Patient/Family Comments/goals: none  Pain/Comfort:  Pain Rating 1: 10/10  Location 1: back  Pain Addressed 1:  Distraction, Reposition  Pain Rating Post-Intervention 1: 10/10    Patients cultural, spiritual, Orthodoxy conflicts given the current situation: no    Living Environment:  Prior to admit, patient lived with spouse and brother in a [facility] with a ramp. At baseline, she is independent and ambulates with a rollator & RW. Equipment used at home: wheelchair, walker, rolling, shower chair, rollator.  DME owned (not currently used): none.  Upon discharge, patient will have assistance from TBD.    Objective:     Communicated with nurse prior to session.  Patient found supine with LUIS drain  upon PT entry to room.    General Precautions: Standard, fall  Orthopedic Precautions:spinal precautions   Braces: N/A  Respiratory Status: Room air    Exams:  Cognitive Exam:  Patient is oriented to Person, Place, Time, and Situation  Sensation: -       Intact  RLE ROM: WFL  RLE Strength: -4/5 grossly  LLE ROM: WFL  LLE Strength: -4/5 grossly  Skin integrity: Visible skin intact      Functional Mobility:  Bed Mobility:  Supine to Sit: maximal assistance  Sit to Supine: maximal assistance  Transfers:  Sit to Stand:  moderate assistance and of 2 persons with rolling walker  Gait: 2 side-steps with RW & MOD A of 2. Very shaky while standing. Limited by severe pain.  Balance: fair/poor      AM-PAC 6 CLICK MOBILITY  Total Score:12     Education Provided:  Role and goals of PT, transfer training, bed mobility, gait training, balance training, safety awareness, assistive device, strengthening exercises, and importance of participating in PT to return to PLOF.    Patient left supine with all lines intact, call button in reach, pressure relief boots, and bed alarm on.    GOALS:   Multidisciplinary Problems       Physical Therapy Goals          Problem: Physical Therapy    Goal Priority Disciplines Outcome Interventions   Physical Therapy Goal     PT, PT/OT Progressing    Description: Goals to be met by: 4/20/25     Patient will increase functional  independence with mobility by performin. Supine to sit with [place]  2. Sit to supine with [place]  3. Sit to stand transfer with Modified [place]  4. Gait  x 200 feet with Modified [place] using Rolling Walker.                          History:     Past Medical History:   Diagnosis Date    Anxiety     Colon polyps 11/10/2020    Depression     Diabetes mellitus     Hypertension     Wears dentures        Past Surgical History:   Procedure Laterality Date     SECTION      GALLBLADDER SURGERY      LAMINECTOMY OF THORACIC SPINE WITH SURGICAL REMOVAL OF INTRASPINAL LESION  3/19/2025    Procedure: LAMINECTOMY, SPINE, THORACIC, POSTERIOR APPROACH, WITH EXCISION OF SPINAL CORD LESION;  Surgeon: Serge Atkins MD;  Location: [facility];  Service: Neurosurgery;;  thoracic tumor removal    THORACIC LAMINECTOMY N/A 3/19/2025    Procedure: LAMINECTOMY, SPINE, THORACIC;  Surgeon: Serge Atkins MD;  Location: [facility] OR;  Service: Neurosurgery;  Laterality: N/A;  T5-T8 laminectomy       Time Tracking:     PT Received On: 25  PT Start Time: 0828     PT Stop Time: 0850  PT Total Time (min): 22 min     Billable Minutes: Evaluation 22 minutes      2025

## 2025-03-20 NOTE — PLAN OF CARE
Problem: Occupational Therapy  Goal: Occupational Therapy Goal  Description: Goals to be met by: in 1 month      Patient will increase functional independence with ADLs by performing:    LE Dressing with Modified [place].  Grooming while standing with Modified [place].  Toileting from bedside commode with Modified [place] for hygiene and clothing management.   Toilet transfer to bedside commode with Modified [place].    Outcome: Progressing

## 2025-03-20 NOTE — PLAN OF CARE
Problem: Physical Therapy  Goal: Physical Therapy Goal  Description: Goals to be met by: 25     Patient will increase functional independence with mobility by performin. Supine to sit with [place]  2. Sit to supine with [place]  3. Sit to stand transfer with Modified [place]  4. Gait  x 200 feet with Modified [place] using Rolling Walker.     Outcome: Progressing

## 2025-03-20 NOTE — ANESTHESIA POSTPROCEDURE EVALUATION
Anesthesia Post Evaluation    Patient: Brittany Ordonez    Procedure(s) Performed: Procedure(s) (LRB):  LAMINECTOMY, SPINE, THORACIC (N/A)  LAMINECTOMY, SPINE, THORACIC, POSTERIOR APPROACH, WITH EXCISION OF SPINAL CORD LESION    Final Anesthesia Type: general      Patient location during evaluation: PACU  Patient participation: Yes- Able to Participate  Level of consciousness: awake and alert  Post-procedure vital signs: reviewed and stable  Pain management: adequate  Airway patency: patent    PONV status at discharge: No PONV  Anesthetic complications: no      Cardiovascular status: blood pressure returned to baseline  Respiratory status: unassisted and spontaneous ventilation  Hydration status: euvolemic  Follow-up needed               Vitals Value Taken Time   /72 03/19/25 23:35   Temp 36.4 °C (97.6 °F) 03/19/25 23:35   Pulse 50 03/19/25 23:35   Resp 18 03/19/25 23:44   SpO2 98 % 03/20/25 01:00         Event Time   Out of Recovery 18:25:00         Pain/Sona Score: Pain Rating Prior to Med Admin: 9 (3/19/2025 11:44 PM)  Pain Rating Post Med Admin: 5 (3/20/2025 12:44 AM)  Sona Score: 8 (3/19/2025  6:30 PM)

## 2025-03-20 NOTE — PLAN OF CARE
Joanne with financial aid attempted to complete Medicaid fede with pt at bedside, but pt sleeping. Will try again tomorrow. SW will discuss post-acute placement with pt once insurance is established.     Vi Lacy LCSW

## 2025-03-21 LAB
ALBUMIN SERPL-MCNC: 3.3 G/DL (ref 3.4–4.8)
ALBUMIN/GLOB SERPL: 1.1 RATIO (ref 1.1–2)
ALP SERPL-CCNC: 58 UNIT/L (ref 40–150)
ALT SERPL-CCNC: 9 UNIT/L (ref 0–55)
ANION GAP SERPL CALC-SCNC: 8 MEQ/L
AST SERPL-CCNC: 14 UNIT/L (ref 11–45)
BASOPHILS # BLD AUTO: 0.08 X10(3)/MCL
BASOPHILS NFR BLD AUTO: 0.7 %
BILIRUB SERPL-MCNC: 1 MG/DL
BUN SERPL-MCNC: 14 MG/DL (ref 9.8–20.1)
CALCIUM SERPL-MCNC: 8.5 MG/DL (ref 8.4–10.2)
CHLORIDE SERPL-SCNC: 102 MMOL/L (ref 98–107)
CO2 SERPL-SCNC: 25 MMOL/L (ref 23–31)
CREAT SERPL-MCNC: 0.59 MG/DL (ref 0.55–1.02)
CREAT/UREA NIT SERPL: 24
EOSINOPHIL # BLD AUTO: 0.23 X10(3)/MCL (ref 0–0.9)
EOSINOPHIL NFR BLD AUTO: 2.2 %
ERYTHROCYTE [DISTWIDTH] IN BLOOD BY AUTOMATED COUNT: 12.7 % (ref 11.5–17)
GFR SERPLBLD CREATININE-BSD FMLA CKD-EPI: >60 ML/MIN/1.73/M2
GLOBULIN SER-MCNC: 3 GM/DL (ref 2.4–3.5)
GLUCOSE SERPL-MCNC: 127 MG/DL (ref 82–115)
HCT VFR BLD AUTO: 36.8 % (ref 37–47)
HGB BLD-MCNC: 12.3 G/DL (ref 12–16)
IMM GRANULOCYTES # BLD AUTO: 0.05 X10(3)/MCL (ref 0–0.04)
IMM GRANULOCYTES NFR BLD AUTO: 0.5 %
LYMPHOCYTES # BLD AUTO: 2.63 X10(3)/MCL (ref 0.6–4.6)
LYMPHOCYTES NFR BLD AUTO: 24.6 %
MCH RBC QN AUTO: 31.3 PG (ref 27–31)
MCHC RBC AUTO-ENTMCNC: 33.4 G/DL (ref 33–36)
MCV RBC AUTO: 93.6 FL (ref 80–94)
MONOCYTES # BLD AUTO: 0.97 X10(3)/MCL (ref 0.1–1.3)
MONOCYTES NFR BLD AUTO: 9.1 %
NEUTROPHILS # BLD AUTO: 6.71 X10(3)/MCL (ref 2.1–9.2)
NEUTROPHILS NFR BLD AUTO: 62.9 %
NRBC BLD AUTO-RTO: 0 %
PLATELET # BLD AUTO: 236 X10(3)/MCL (ref 130–400)
PMV BLD AUTO: 9.2 FL (ref 7.4–10.4)
POCT GLUCOSE: 143 MG/DL (ref 70–110)
POCT GLUCOSE: 155 MG/DL (ref 70–110)
POTASSIUM SERPL-SCNC: 3.8 MMOL/L (ref 3.5–5.1)
PROT SERPL-MCNC: 6.3 GM/DL (ref 5.8–7.6)
PSYCHE PATHOLOGY RESULT: NORMAL
RBC # BLD AUTO: 3.93 X10(6)/MCL (ref 4.2–5.4)
SODIUM SERPL-SCNC: 135 MMOL/L (ref 136–145)
WBC # BLD AUTO: 10.67 X10(3)/MCL (ref 4.5–11.5)

## 2025-03-21 PROCEDURE — 63600175 PHARM REV CODE 636 W HCPCS

## 2025-03-21 PROCEDURE — 63600175 PHARM REV CODE 636 W HCPCS: Performed by: INTERNAL MEDICINE

## 2025-03-21 PROCEDURE — 25000003 PHARM REV CODE 250: Performed by: HOSPITALIST

## 2025-03-21 PROCEDURE — 36415 COLL VENOUS BLD VENIPUNCTURE: CPT | Performed by: INTERNAL MEDICINE

## 2025-03-21 PROCEDURE — 85025 COMPLETE CBC W/AUTO DIFF WBC: CPT | Performed by: INTERNAL MEDICINE

## 2025-03-21 PROCEDURE — 27000221 HC OXYGEN, UP TO 24 HOURS

## 2025-03-21 PROCEDURE — 97535 SELF CARE MNGMENT TRAINING: CPT | Mod: CO

## 2025-03-21 PROCEDURE — 80053 COMPREHEN METABOLIC PANEL: CPT | Performed by: INTERNAL MEDICINE

## 2025-03-21 PROCEDURE — 63600175 PHARM REV CODE 636 W HCPCS: Performed by: NURSE PRACTITIONER

## 2025-03-21 PROCEDURE — 25000003 PHARM REV CODE 250: Performed by: INTERNAL MEDICINE

## 2025-03-21 PROCEDURE — 21400001 HC TELEMETRY ROOM

## 2025-03-21 PROCEDURE — 97530 THERAPEUTIC ACTIVITIES: CPT | Mod: CQ

## 2025-03-21 PROCEDURE — 25000003 PHARM REV CODE 250

## 2025-03-21 PROCEDURE — 11000001 HC ACUTE MED/SURG PRIVATE ROOM

## 2025-03-21 RX ORDER — ENOXAPARIN SODIUM 100 MG/ML
40 INJECTION SUBCUTANEOUS EVERY 24 HOURS
Status: DISCONTINUED | OUTPATIENT
Start: 2025-03-21 | End: 2025-03-24

## 2025-03-21 RX ORDER — HYDROCHLOROTHIAZIDE 25 MG/1
120 TABLET ORAL DAILY
Status: DISCONTINUED | OUTPATIENT
Start: 2025-03-21 | End: 2025-03-22

## 2025-03-21 RX ORDER — CARVEDILOL 3.12 MG/1
3.12 TABLET ORAL 2 TIMES DAILY WITH MEALS
Status: DISCONTINUED | OUTPATIENT
Start: 2025-03-21 | End: 2025-03-22

## 2025-03-21 RX ADMIN — MUPIROCIN: 20 OINTMENT TOPICAL at 09:03

## 2025-03-21 RX ADMIN — CARVEDILOL 3.12 MG: 3.12 TABLET, FILM COATED ORAL at 04:03

## 2025-03-21 RX ADMIN — INSULIN ASPART 2 UNITS: 100 INJECTION, SOLUTION INTRAVENOUS; SUBCUTANEOUS at 05:03

## 2025-03-21 RX ADMIN — HYDROCODONE BITARTRATE AND ACETAMINOPHEN 1 TABLET: 10; 325 TABLET ORAL at 09:03

## 2025-03-21 RX ADMIN — ENOXAPARIN SODIUM 40 MG: 40 INJECTION SUBCUTANEOUS at 04:03

## 2025-03-21 RX ADMIN — ONDANSETRON 4 MG: 2 INJECTION INTRAMUSCULAR; INTRAVENOUS at 11:03

## 2025-03-21 RX ADMIN — HYDROXYZINE HYDROCHLORIDE 25 MG: 25 TABLET, FILM COATED ORAL at 09:03

## 2025-03-21 RX ADMIN — MORPHINE SULFATE 4 MG: 4 INJECTION INTRAVENOUS at 07:03

## 2025-03-21 RX ADMIN — GABAPENTIN 300 MG: 300 CAPSULE ORAL at 09:03

## 2025-03-21 RX ADMIN — SENNOSIDES AND DOCUSATE SODIUM 2 TABLET: 50; 8.6 TABLET ORAL at 09:03

## 2025-03-21 RX ADMIN — VERAPAMIL HYDROCHLORIDE 120 MG: 120 TABLET, FILM COATED, EXTENDED RELEASE ORAL at 04:03

## 2025-03-21 RX ADMIN — HYDROCODONE BITARTRATE AND ACETAMINOPHEN 1 TABLET: 10; 325 TABLET ORAL at 03:03

## 2025-03-21 RX ADMIN — PAROXETINE HYDROCHLORIDE 20 MG: 20 TABLET, FILM COATED ORAL at 09:03

## 2025-03-21 RX ADMIN — MUPIROCIN: 20 OINTMENT TOPICAL at 08:03

## 2025-03-21 RX ADMIN — GABAPENTIN 300 MG: 300 CAPSULE ORAL at 03:03

## 2025-03-21 RX ADMIN — HYDROCODONE BITARTRATE AND ACETAMINOPHEN 1 TABLET: 10; 325 TABLET ORAL at 04:03

## 2025-03-21 NOTE — PLAN OF CARE
Problem: Adult Inpatient Plan of Care  Goal: Plan of Care Review  3/21/2025 1113 by Nancy Wagner LPN  Flowsheets (Taken 3/21/2025 1113)  Plan of Care Reviewed With: patient  3/21/2025 1113 by Nancy Wagner LPN  Outcome: Progressing  Goal: Patient-Specific Goal (Individualized)  Outcome: Progressing  Goal: Absence of Hospital-Acquired Illness or Injury  Outcome: Progressing  Intervention: Identify and Manage Fall Risk  Flowsheets (Taken 3/21/2025 1113)  Safety Promotion/Fall Prevention:   assistive device/personal item within reach   Fall Risk reviewed with patient/family   instructed to call staff for mobility   lighting adjusted   medications reviewed   nonskid shoes/socks when out of bed   side rails raised x 2  Intervention: Prevent Skin Injury  Flowsheets (Taken 3/21/2025 1113)  Body Position:   supine   sitting up in bed  Skin Protection: incontinence pads utilized  Device Skin Pressure Protection:   absorbent pad utilized/changed   positioning supports utilized   tubing/devices free from skin contact  Intervention: Prevent and Manage VTE (Venous Thromboembolism) Risk  Flowsheets (Taken 3/21/2025 1113)  VTE Prevention/Management:   ROM (active) performed   ROM (passive) performed   dorsiflexion/plantar flexion performed   fluids promoted   remove, assess skin, and reapply sequential compression device  Intervention: Prevent Infection  Flowsheets (Taken 3/21/2025 1113)  Infection Prevention: hand hygiene promoted  Goal: Optimal Comfort and Wellbeing  Outcome: Progressing  Intervention: Monitor Pain and Promote Comfort  Flowsheets (Taken 3/21/2025 1113)  Pain Management Interventions:   care clustered   medication offered   pain management plan reviewed with patient/caregiver   pillow support provided   position adjusted  Goal: Readiness for Transition of Care  Outcome: Progressing     Problem: Skin Injury Risk Increased  Goal: Skin Health and Integrity  Outcome: Progressing  Intervention: Optimize Skin  Protection  Flowsheets (Taken 3/21/2025 1113)  Pressure Reduction Techniques: frequent weight shift encouraged  Pressure Reduction Devices:   positioning supports utilized   heel offloading device utilized  Skin Protection: incontinence pads utilized  Activity Management: Rolling - L1  Head of Bed (HOB) Positioning: HOB at 30 degrees     Problem: Infection  Goal: Absence of Infection Signs and Symptoms  Outcome: Progressing     Problem: Wound  Goal: Optimal Coping  Outcome: Progressing  Goal: Optimal Functional Ability  Outcome: Progressing  Goal: Absence of Infection Signs and Symptoms  Outcome: Progressing  Goal: Improved Oral Intake  Outcome: Progressing  Goal: Optimal Pain Control and Function  Outcome: Progressing  Goal: Skin Health and Integrity  Outcome: Progressing  Goal: Optimal Wound Healing  Outcome: Progressing

## 2025-03-21 NOTE — PT/OT/SLP PROGRESS
Occupational Therapy   Treatment    Name: Brittany Ordonez  MRN: [id number]    Recommendations:     Recommended therapy intensity at discharge: High Intensity Therapy   Discharge Equipment Recommendations:  bath bench, hip kit  Barriers to discharge:       Assessment:     Brittany Ordonez is a 65 y.o. female with a medical diagnosis of bilateral LE weakness, lesion T7 s/p laminectomy T6-9. Performance deficits affecting function are weakness, impaired endurance, impaired self care skills, impaired functional mobility, gait instability, impaired balance, pain. Pt limited by pain and fear of falling. Required extended time for all movement.     Rehab Prognosis:  Good; patient would benefit from acute skilled OT services to address these deficits and reach maximum level of function.       Plan:     Patient to be seen 6 x/week to address the above listed problems via self-care/home management, therapeutic activities, therapeutic exercises  Plan of Care Expires: 04/18/25  Plan of Care Reviewed with: patient    Subjective     Pain/Comfort:  Location - Orientation 1: generalized  Location 1: back  Pain Addressed 1: Reposition, Distraction, Pre-medicate for activity    Objective:     Communicated with: RN prior to session.  Patient found HOB elevated with dupree catheter, LUIS drain upon OT entry to room.    General Precautions: Standard, fall    Orthopedic Precautions:spinal precautions  Braces: N/A  Respiratory Status: Nasal cannula, flow 3 L/min     Occupational Performance:     Bed Mobility:    Patient completed Scooting/Bridging with minimum assistance  Patient completed Supine to Sit with minimum assistance  Patient completed Sit to Supine with maximal assistance and 2 persons 2/2 back pain. Pt resisting and unable to lift BLE.     Functional Mobility/Transfers:  Patient completed Sit <> Stand Transfer with moderate assistance  with  rolling walker  x2 trials from EOB.     Activities of Daily Living:  Attempted step t/f  to [facility] x2 trials however unable to successfully complete. Unable to advance feet and pt very anxious to fall despite redirection. Had to return to EOB for safety.   Mod A to don back gown as robe.     Therapeutic Positioning    OT interventions performed during the course of today's session in an effort to prevent and/or reduce acquired pressure injuries:   Education was provided on benefits of and recommendations for therapeutic positioning  Therapeutic positioning was provided at the conclusion of session to offload all bony prominences for the prevention and/or reduction of pressure injuries    [facility] 6 Click ADL: 15    Patient Education:  Patient provided with verbal education education regarding OT role/goals/POC, fall prevention, and safety awareness.  Understanding was verbalized, however additional teaching warranted.      Patient left right sidelying with all lines intact, call button in reach, wedge under L side, and pressure relief boots.    GOALS:   Multidisciplinary Problems       Occupational Therapy Goals          Problem: Occupational Therapy    Goal Priority Disciplines Outcome Interventions   Occupational Therapy Goal     OT, PT/OT Progressing    Description: Goals to be met by: in 1 month      Patient will increase functional independence with ADLs by performing:    LE Dressing with Modified [place].  Grooming while standing with Modified [place].  Toileting from bedside commode with Modified [place] for hygiene and clothing management.   Toilet transfer to bedside commode with Modified [place].                         Time Tracking:     OT Date of Treatment: 03/21/25  OT Start Time: 0946  OT Stop Time: 1017  OT Total Time (min): 31 min    Billable Minutes:Self Care/Home Management 31    OT/LINDA: LINDA     Number of LINDA visits since last OT visit: 1    3/21/2025

## 2025-03-21 NOTE — PT/OT/SLP PROGRESS
"Physical Therapy Treatment    Patient Name:  Brittany Ordonez   MRN:  [id number]    Recommendations:     Discharge therapy intensity: High Intensity Therapy   Discharge Equipment Recommendations: to be determined by next level of care  Barriers to discharge: Impaired mobility    Assessment:     Brittany Ordonez is a 65 y.o. female admitted with a medical diagnosis of  spinal mass with cord compression. S/p laminectomy @ T6, T7, and T8. Prior to admit, patient lived with spouse and brother in a [facility] with a ramp. At baseline, she is independent and ambulates with a rollator & RW.  .  She presents with the following impairments/functional limitations: weakness, impaired endurance, impaired self care skills, impaired functional mobility, gait instability, impaired balance, decreased safety awareness, pain .    Pt limited by pain and "weakness." Multiple attempts to step to BSC. Pt unable to safely complete T/F at this time. Plan to attempt in future sessions.     Rehab Prognosis: Good; patient would benefit from acute skilled PT services to address these deficits and reach maximum level of function.    Recent Surgery: Procedure(s) (LRB):  LAMINECTOMY, SPINE, THORACIC (N/A)  LAMINECTOMY, SPINE, THORACIC, POSTERIOR APPROACH, WITH EXCISION OF SPINAL CORD LESION 2 Days Post-Op    Plan:     During this hospitalization, patient would benefit from acute PT services 6 x/week to address the identified rehab impairments via gait training, therapeutic activities, therapeutic exercises, neuromuscular re-education and progress toward the following goals:    Plan of Care Expires:  04/20/25    Subjective     Chief Complaint:   Patient/Family Comments/goals:   Pain/Comfort:  Location 1: back  Pain Addressed 1: Reposition, Distraction, Cessation of Activity, Pre-medicate for activity      Objective:     Communicated with NSG prior to session.  Patient found HOB elevated with LUIS drain upon PT entry to room.     General Precautions: " Standard, fall  Orthopedic Precautions: spinal precautions  Braces: N/A  Respiratory Status: Nasal cannula, flow 3 L/min  Blood Pressure:   Skin Integrity:       Functional Mobility:  Bed Mobility:     Rolling Right: moderate assistance and to place wedge   Scooting: minimum assistance and required increased time   Bridging: stand by assistance and required increased time. Done to reposition in bed    Supine to Sit: minimum assistance and required extensive time   Sit to Supine: maximal assistance and of 2 persons  Transfers:     Sit to Stand:  moderate assistance with rolling walker and 2 trials from EOB  T/F training: attempted multiple times to complete BSC transfer. Pt unable to advance feet and turn fully to commode. Also unable to pivot 2/2 resistance. Required extensive time. Therapist ended up assisting pt back onto EOB to ensure safety. Pt modAx2 with RW.   Pregait: pt able to taee 4 lateral steps to her L side modA with RW.       Patient left HOB elevated with all lines intact, call button in reach, wedge under L side, and pressure relief boots    GOALS:   Multidisciplinary Problems       Physical Therapy Goals          Problem: Physical Therapy    Goal Priority Disciplines Outcome Interventions   Physical Therapy Goal     PT, PT/OT Progressing    Description: Goals to be met by: 25     Patient will increase functional independence with mobility by performin. Supine to sit with [place]  2. Sit to supine with [place]  3. Sit to stand transfer with Modified [place]  4. Gait  x 200 feet with Modified [place] using Rolling Walker.                          Time Tracking:     PT Received On: 25  PT Start Time: 946     PT Stop Time: 1017  PT Total Time (min): 31 min     Billable Minutes: Therapeutic Activity 31    Treatment Type: Treatment  PT/PTA: PTA     Number of PTA visits since last PT visit: 2025

## 2025-03-21 NOTE — PROGRESS NOTES
[facility] Hospital Medicine Progress Note        Chief Complaint: Inpatient Follow-up    HPI:     65-year-old female with significant history of colon polyp, anxiety/depression, type 2 diabetes mellitus, HTN, presented to the ED with complaints of bilateral lower extremity weakness.  Patient was recently evaluated in outlying facility and was found to have a thoracic spine mass with concern for cord compression, recommended admission but ended up leaving against medical advice.  Patient continued with weakness and difficulty voiding and therefore she decided to come back to the ED. MRI revealed mass along T6-T7 causing severe spinal canal narrowing, cervical disc herniation/protrusion, disc herniation at T7-T8, lumbar spinal canal narrowing at L4-L5, disc herniation L3-L4, L1-L2.  Patient admitted to hospital medicine services, neurosurgery services consulted.  Pelvis with no focus of malignancy.  Patient was taken to OR by Neurosurgery on 03/19 and she underwent thoracic spine laminectomy with excision of spinal cord lesion, awaiting path.      Interval Hx:   Patient seen at bedside, she was examined just after therapy services, she was exhausted and also reported that it hurts to work with therapy, BP slightly accelerated, no new complaints, no acute events overnight, LUIS drain in place  Objective/physical exam:  General: In no acute distress, afebrile  Chest: Clear to auscultation bilaterally  Heart: S1, S2, no appreciable murmur  Abdomen: Soft, nontender, BS +  MSK: Warm, no lower extremity edema, no clubbing or cyanosis  Neurologic: Alert and oriented x4, paraplegia  VITAL SIGNS: 24 HRS MIN & MAX LAST   Temp  Min: 98.4 °F (36.9 °C)  Max: 99.5 °F (37.5 °C) 98.5 °F (36.9 °C)   BP  Min: 119/61  Max: 170/68 119/61   Pulse  Min: 55  Max: 66  (!) 55   Resp  Min: 16  Max: 19 16   SpO2  Min: 89 %  Max: 93 % (!) 92 %       Recent Labs   Lab 03/14/25  1139 03/19/25  0056 03/21/25  0459    WBC 12.01   < > 10.67   RBC 4.88   < > 3.93*   HGB 14.9   < > 12.3   HCT 45.4   < > 36.8*   MCV 93   < > 93.6   MCH 30.5   < > 31.3*   MCHC 32.8   < > 33.4   RDW 12.6   < > 12.7      < > 236   MPV 8.9*   < > 9.2   GRAN 69.5  8.3*  --   --    LYMPH 19.6  2.4  --   --    MONO 6.2  0.8  --   --    BASO 0.11  --   --    NRBC 0  --   --     < > = values in this interval not displayed.         Recent Labs   Lab 03/14/25  1139 03/19/25  0056 03/19/25  0554 03/20/25  0504 03/21/25  0453      < > 142   < > 135*   K 5.5*   < > 3.5   < > 3.8      < > 107   < > 102   CO2 28   < > 24   < > 25   ANIONGAP 11  --   --   --   --    BUN 13   < > 11.6   < > 14.0   CREATININE 0.7   < > 0.59   < > 0.59   *  --   --   --   --    CALCIUM 10.1   < > 9.1   < > 8.5   MG  --   --  1.60  --   --    ALBUMIN 3.7   < > 3.4  --  3.3*   PROT 7.2  --   --   --   --    ALKPHOS 78   < > 76  --  58   ALT 16   < > 15  --  9   AST 18   < > 14  --  14   BILITOT 0.8   < > 1.0  --  1.0    < > = values in this interval not displayed.          Microbiology Results (last 7 days)       ** No results found for the last 168 hours. **             Scheduled Med:   bisacodyL  10 mg Rectal Daily    carvediloL  3.125 mg Oral BID WM    gabapentin  300 mg Oral TID    hydrOXYzine HCL  25 mg Oral BID    mupirocin   Nasal BID    paroxetine  20 mg Oral Daily    senna-docusate 8.6-50 mg  2 tablet Oral BID          Assessment/Plan:    Thoracic spinal mass along T6-T7 causing severe spinal canal narrowing status post laminectomy, excision of mass, postop day 2.  Paraplegia likely secondary to above   Multilevel cervical disc herniation/multilevel lumbar spinal canal stenosis with disc herniation  History of colon polyp   Mildly elevated CEA  History of anxiety/depression   Type 2 diabetes mellitus -stable  Essential HTN-stable   Prophylaxis      Postop recommendations per Neurosurgery who is closely following the patient   LUIS drain in  place  Continue therapy services   Await path  CT chest, abdomen, pelvis with no focus of malignancy   CEA slightly elevated, await path, decision regarding inpatient versus outpatient colonoscopy will be decided based on path from spinal mass resection  CBG stable   Continue sliding scale  Continue home meds-Coreg, gabapentin, Vistaril, paroxetine   BP accelerated, reduce Coreg to home dose given borderline bradycardia, will resume home verapamil  Continue bowel regimen   DVT prophylaxis-neurosurgery has cleared her for subcu Lovenox, added    Plan for inpatient rehab placement once medically stable      Wen Romo MD   03/21/2025

## 2025-03-22 LAB
OHS QRS DURATION: 130 MS
OHS QTC CALCULATION: 437 MS
POCT GLUCOSE: 137 MG/DL (ref 70–110)
POCT GLUCOSE: 144 MG/DL (ref 70–110)
POCT GLUCOSE: 179 MG/DL (ref 70–110)
POCT GLUCOSE: 195 MG/DL (ref 70–110)

## 2025-03-22 PROCEDURE — 63600175 PHARM REV CODE 636 W HCPCS

## 2025-03-22 PROCEDURE — 93010 ELECTROCARDIOGRAM REPORT: CPT | Mod: ,,, | Performed by: INTERNAL MEDICINE

## 2025-03-22 PROCEDURE — 25000003 PHARM REV CODE 250

## 2025-03-22 PROCEDURE — 25000003 PHARM REV CODE 250: Performed by: HOSPITALIST

## 2025-03-22 PROCEDURE — 97535 SELF CARE MNGMENT TRAINING: CPT | Mod: CO

## 2025-03-22 PROCEDURE — 21400001 HC TELEMETRY ROOM

## 2025-03-22 PROCEDURE — 25000003 PHARM REV CODE 250: Performed by: INTERNAL MEDICINE

## 2025-03-22 PROCEDURE — 99024 POSTOP FOLLOW-UP VISIT: CPT | Mod: ,,, | Performed by: NEUROLOGICAL SURGERY

## 2025-03-22 PROCEDURE — 63600175 PHARM REV CODE 636 W HCPCS: Performed by: INTERNAL MEDICINE

## 2025-03-22 PROCEDURE — 97530 THERAPEUTIC ACTIVITIES: CPT | Mod: CQ

## 2025-03-22 PROCEDURE — 11000001 HC ACUTE MED/SURG PRIVATE ROOM

## 2025-03-22 PROCEDURE — 93005 ELECTROCARDIOGRAM TRACING: CPT

## 2025-03-22 PROCEDURE — 63600175 PHARM REV CODE 636 W HCPCS: Performed by: NURSE PRACTITIONER

## 2025-03-22 RX ORDER — HYDRALAZINE HYDROCHLORIDE 50 MG/1
50 TABLET, FILM COATED ORAL EVERY 8 HOURS
Status: DISCONTINUED | OUTPATIENT
Start: 2025-03-22 | End: 2025-03-23

## 2025-03-22 RX ORDER — ONDANSETRON 4 MG/1
4 TABLET, ORALLY DISINTEGRATING ORAL EVERY 4 HOURS PRN
Status: DISCONTINUED | OUTPATIENT
Start: 2025-03-22 | End: 2025-03-26 | Stop reason: HOSPADM

## 2025-03-22 RX ORDER — HYDRALAZINE HYDROCHLORIDE 25 MG/1
25 TABLET, FILM COATED ORAL EVERY 8 HOURS
Status: DISCONTINUED | OUTPATIENT
Start: 2025-03-22 | End: 2025-03-22

## 2025-03-22 RX ORDER — HYDROCHLOROTHIAZIDE 25 MG/1
240 TABLET ORAL DAILY
Status: DISCONTINUED | OUTPATIENT
Start: 2025-03-22 | End: 2025-03-22

## 2025-03-22 RX ADMIN — ONDANSETRON 4 MG: 2 INJECTION INTRAMUSCULAR; INTRAVENOUS at 11:03

## 2025-03-22 RX ADMIN — HYDROCODONE BITARTRATE AND ACETAMINOPHEN 1 TABLET: 5; 325 TABLET ORAL at 09:03

## 2025-03-22 RX ADMIN — HYDRALAZINE HYDROCHLORIDE 10 MG: 20 INJECTION INTRAMUSCULAR; INTRAVENOUS at 06:03

## 2025-03-22 RX ADMIN — CARVEDILOL 3.12 MG: 3.12 TABLET, FILM COATED ORAL at 06:03

## 2025-03-22 RX ADMIN — HYDROCODONE BITARTRATE AND ACETAMINOPHEN 1 TABLET: 10; 325 TABLET ORAL at 06:03

## 2025-03-22 RX ADMIN — ONDANSETRON 4 MG: 4 TABLET, ORALLY DISINTEGRATING ORAL at 11:03

## 2025-03-22 RX ADMIN — ENOXAPARIN SODIUM 40 MG: 40 INJECTION SUBCUTANEOUS at 05:03

## 2025-03-22 RX ADMIN — HYDRALAZINE HYDROCHLORIDE 50 MG: 50 TABLET ORAL at 09:03

## 2025-03-22 RX ADMIN — HYDROXYZINE HYDROCHLORIDE 25 MG: 25 TABLET, FILM COATED ORAL at 09:03

## 2025-03-22 RX ADMIN — GABAPENTIN 300 MG: 300 CAPSULE ORAL at 03:03

## 2025-03-22 RX ADMIN — PAROXETINE HYDROCHLORIDE 20 MG: 20 TABLET, FILM COATED ORAL at 09:03

## 2025-03-22 RX ADMIN — MORPHINE SULFATE 4 MG: 4 INJECTION INTRAVENOUS at 03:03

## 2025-03-22 RX ADMIN — SENNOSIDES AND DOCUSATE SODIUM 2 TABLET: 50; 8.6 TABLET ORAL at 09:03

## 2025-03-22 RX ADMIN — HYDRALAZINE HYDROCHLORIDE 50 MG: 50 TABLET ORAL at 03:03

## 2025-03-22 RX ADMIN — GABAPENTIN 300 MG: 300 CAPSULE ORAL at 09:03

## 2025-03-22 RX ADMIN — VERAPAMIL HYDROCHLORIDE 240 MG: 120 TABLET, FILM COATED, EXTENDED RELEASE ORAL at 09:03

## 2025-03-22 RX ADMIN — MUPIROCIN: 20 OINTMENT TOPICAL at 09:03

## 2025-03-22 RX ADMIN — HYDROCODONE BITARTRATE AND ACETAMINOPHEN 1 TABLET: 10; 325 TABLET ORAL at 01:03

## 2025-03-22 NOTE — PT/OT/SLP PROGRESS
Physical Therapy Treatment    Patient Name:  Brittany Ordonez   MRN:  [id number]    Recommendations:     Discharge therapy intensity: High Intensity Therapy   Discharge Equipment Recommendations: to be determined by next level of care  Barriers to discharge: Impaired mobility    Assessment:     Brittany Ordonez is a 65 y.o. female admitted with a medical diagnosis of  spinal mass with cord compression. S/p laminectomy @ T6, T7, and T8. Prior to admit, patient lived with spouse and brother in a [facility] with a ramp. At baseline, she is independent and ambulates with a rollator & RW.  She presents with the following impairments/functional limitations: weakness, impaired endurance, impaired self care skills, impaired functional mobility, gait instability, impaired balance, decreased safety awareness, pain .    Pt able to progress in therapy this date. Pt able to t/f from bed to bedside commode and to reclining chair. Will cont to progress.     Rehab Prognosis: Good; patient would benefit from acute skilled PT services to address these deficits and reach maximum level of function.    Recent Surgery: Procedure(s) (LRB):  LAMINECTOMY, SPINE, THORACIC (N/A)  LAMINECTOMY, SPINE, THORACIC, POSTERIOR APPROACH, WITH EXCISION OF SPINAL CORD LESION 3 Days Post-Op    Plan:     During this hospitalization, patient would benefit from acute PT services 6 x/week to address the identified rehab impairments via gait training, therapeutic activities, therapeutic exercises, neuromuscular re-education and progress toward the following goals:    Plan of Care Expires:  04/20/25    Subjective     Chief Complaint: pain at incision sight  Patient/Family Comments/goals: none stated  Pain/Comfort:         Objective:     Communicated with NSG prior to session.  Patient found HOB elevated with LUIS drain upon PT entry to room.     General Precautions: Standard, fall  Orthopedic Precautions: spinal precautions  Braces: N/A  Respiratory Status: Room  air  Blood Pressure: NT  Skin Integrity: bandages along spine      Functional Mobility:  Bed Mobility:     Sit to Supine: stand by assistance  Transfers:     Sit to Stand:  moderate assistance with rolling walker and 2 trials from EOB  Bed to BSC: minimum assistance with  rolling walker  using  Step Transfer  BSC to Chair: minimum assistance with  rolling walker  using  Step Transfer  Patient with slow movement during t/fs and decreased bilat foot clearance  Static standing balance: SBA  Dynamic standing balance: CGA-Veronica for safety      Patient left HOB elevated with all lines intact, call button in reach, wedge under L side, and pressure relief boots    GOALS:   Multidisciplinary Problems       Physical Therapy Goals          Problem: Physical Therapy    Goal Priority Disciplines Outcome Interventions   Physical Therapy Goal     PT, PT/OT Progressing    Description: Goals to be met by: 25     Patient will increase functional independence with mobility by performin. Supine to sit with [place]  2. Sit to supine with [place]  3. Sit to stand transfer with Modified [place]  4. Gait  x 200 feet with Modified [place] using Rolling Walker.                          Time Tracking:     PT Received On: 25  PT Start Time: 936     PT Stop Time: 959  PT Total Time (min): 23 min     Billable Minutes: Therapeutic Activity 23    Treatment Type: Treatment  PT/PTA: PTA     Number of PTA visits since last PT visit: 2     2025

## 2025-03-22 NOTE — PROGRESS NOTES
[facility] Neuro  Neurosurgery  Progress Note    Subjective:     Interval History:   POD#3 T6-9 laminectomy, durotomy, and resection of intradural extramedullary tumor    Patient seen at bedside.  Reports improvement to numbness tingling and pain that was present prior to surgical intervention to lower extremities including bilateral feet.  Continues with weakness to bilateral lower extremities.  Denies headaches, nausea and vomiting, or dizziness.      Post-Op Info:  Procedure(s) (LRB):  LAMINECTOMY, SPINE, THORACIC (N/A)  LAMINECTOMY, SPINE, THORACIC, POSTERIOR APPROACH, WITH EXCISION OF SPINAL CORD LESION   3 Days Post-Op      Medications:  Continuous Infusions:  Scheduled Meds:   bisacodyL  10 mg Rectal Daily    enoxparin  40 mg Subcutaneous Q24H (prophylaxis, 1700)    gabapentin  300 mg Oral TID    hydrALAZINE  50 mg Oral Q8H    mupirocin   Nasal BID    paroxetine  20 mg Oral Daily    senna-docusate 8.6-50 mg  2 tablet Oral BID     PRN Meds:  Current Facility-Administered Medications:     acetaminophen, 650 mg, Oral, Q6H PRN    acetaminophen, 650 mg, Oral, Q4H PRN    aluminum-magnesium hydroxide-simethicone, 30 mL, Oral, Q4H PRN    bisacodyL, 10 mg, Rectal, Daily PRN    calcium carbonate, 500 mg, Oral, Daily PRN    dextrose 50%, 12.5 g, Intravenous, PRN    glucagon (human recombinant), 1 mg, Intramuscular, PRN    hydrALAZINE, 10 mg, Intravenous, Q4H PRN    HYDROcodone-acetaminophen, 1 tablet, Oral, Q4H PRN    HYDROcodone-acetaminophen, 1 tablet, Oral, Q4H PRN    insulin aspart U-100, 0-10 Units, Subcutaneous, Q6H PRN    melatonin, 6 mg, Oral, Nightly PRN    morphine, 1 mg, Intravenous, Q1H PRN    morphine, 2 mg, Intravenous, Q1H PRN    morphine, 4 mg, Intravenous, Q3H PRN    naloxone, 0.02 mg, Intravenous, PRN    ondansetron, 4 mg, Oral, Q4H PRN    oxyCODONE-acetaminophen, 1 tablet, Oral, Q4H PRN    prochlorperazine, 5 mg, Intravenous, Q6H PRN    senna-docusate 8.6-50 mg, 1 tablet, Oral,  "BID PRN    sodium chloride 0.9%, 10 mL, Intravenous, PRN     Review of Systems  Objective:     Weight: 117.9 kg (259 lb 14.8 oz)  Body mass index is 36.25 kg/m².  Vital Signs (Most Recent):  Temp: 97.6 °F (36.4 °C) (03/22/25 1107)  Pulse: (!) 45 (03/22/25 1107)  Resp: 18 (03/22/25 0651)  BP: (!) 124/57 (03/22/25 1107)  SpO2: (!) 92 % (03/22/25 1107) Vital Signs (24h Range):  Temp:  [97.6 °F (36.4 °C)-99.1 °F (37.3 °C)] 97.6 °F (36.4 °C)  Pulse:  [] 45  Resp:  [17-18] 18  SpO2:  [91 %-97 %] 92 %  BP: (124-182)/(57-89) 124/57     Date 03/22/25 0700 - 03/23/25 0659   Shift 0998-4209 3612-8711 0016-2998 24 Hour Total   INTAKE   P.O. 340   340   Shift Total(mL/kg) 340(2.9)   340(2.9)   OUTPUT   Urine(mL/kg/hr) 525(0.6)   525   Shift Total(mL/kg) 525(4.5)   525(4.5)   Weight (kg) 117.9 117.9 117.9 117.9            Closed/Suction Drain 03/19/25 1646 Tube - 1 Medial;Posterior Back Bulb 10 Fr. (Active)   Site Description Unable to view 03/22/25 0800   Dressing Type Gauze 03/22/25 0800   Dressing Status Old drainage 03/22/25 0800   Dressing Intervention Integrity maintained 03/22/25 0800   Drainage Serosanguineous 03/22/25 0800   Status Open to gravity drainage 03/22/25 0800   Output (mL) 10 mL 03/21/25 1708       Neurosurgery Physical Exam    GCS 15   Fully oriented to all spheres.    Follows commands   No facial droop, no speech issues.    Moves all extremities well  With the exception of weakness to bilateral lower extremities  Sensation intact with exception of decreased sensation to bilateral feet  No gross visual issues.    Cranial nerves grossly intact   No pronator drift      Significant Labs:  Recent Labs   Lab 03/21/25  0453   *   K 3.8      CO2 25   BUN 14.0   CREATININE 0.59   CALCIUM 8.5     Recent Labs   Lab 03/21/25  0453   WBC 10.67   HGB 12.3   HCT 36.8*        No results for input(s): "LABPT", "INR", "APTT" in the last 48 hours.  Microbiology Results (last 7 days)       ** No results " found for the last 168 hours. **            Significant Diagnostics:      Assessment/Plan:  POD#3 T6-9 laminectomy, durotomy, and resection of intradural extramedullary tumor    Floor status  Neuro checks Q4  Discontinue LUIS.   Dressing changes PRN saturation  MM pain control  Encouraged PT/OT  CM consulted for discharge planning, possible rehab placement  SCDs for DVT  Fall precautions         There are no hospital problems to display for this patient.      Alejandrina Lau, FNP  Neurosurgery  [facility]

## 2025-03-22 NOTE — PT/OT/SLP PROGRESS
Occupational Therapy   Treatment    Name: Brittany Ordonez  MRN: [id number]    Recommendations:     Recommended therapy intensity at discharge: High Intensity Therapy   Discharge Equipment Recommendations:  bath bench, hip kit  Barriers to discharge:       Assessment:     Brittany Ordonez is a 65 y.o. female with a medical diagnosis of spinal mass with cord compression. S/p laminectomy @ T6, T7, and T8. Performance deficits affecting function are weakness, impaired endurance, impaired self care skills, impaired functional mobility, gait instability, impaired balance, pain. Pt tolerated session well and able to transfer to BSC and chair with Min-Mod A and RW. Limited by back pain and fatigues quickly.      Rehab Prognosis:  Good; patient would benefit from acute skilled OT services to address these deficits and reach maximum level of function.       Plan:     Patient to be seen 6 x/week to address the above listed problems via self-care/home management, therapeutic activities, therapeutic exercises  Plan of Care Expires: 04/18/25  Plan of Care Reviewed with: patient, spouse    Subjective     Pain/Comfort:  Location - Orientation 1: generalized  Location 1: back  Pain Addressed 1: Reposition, Distraction, Pre-medicate for activity    Objective:     Communicated with: RN prior to session.  Patient found HOB elevated with LUIS drain upon OT entry to room.    General Precautions: Standard, fall    Orthopedic Precautions:spinal precautions  Braces: N/A  Respiratory Status: Room air     Occupational Performance:     Bed Mobility:    Patient completed Scooting/Bridging with stand by assistance  Patient completed Supine to Sit with stand by assistance     Functional Mobility/Transfers:  Patient completed Sit <> Stand Transfer with moderate assistance  with  rolling walker  x2 trials from EOB.   Performed stand step t/f from bed<>BSC<>chair with Min A and RW. Assist for management of RW.     Activities of Daily  Living:  Toileting: Min [place] [facility] Did not feel urge to void.     Therapeutic Positioning    OT interventions performed during the course of today's session in an effort to prevent and/or reduce acquired pressure injuries:   Education was provided on benefits of and recommendations for therapeutic positioning    [facility] 6 Click ADL: 16    Patient Education:  Patient provided with verbal education education regarding OT role/goals/POC, fall prevention, and safety awareness.  Understanding was verbalized.      Patient left up in chair with all lines intact, call button in reach, geomat cushion, amberly pad in place, and  present.    GOALS:   Multidisciplinary Problems       Occupational Therapy Goals          Problem: Occupational Therapy    Goal Priority Disciplines Outcome Interventions   Occupational Therapy Goal     OT, PT/OT Progressing    Description: Goals to be met by: in 1 month      Patient will increase functional independence with ADLs by performing:    LE Dressing with Modified [place].  Grooming while standing with Modified [place].  Toileting from bedside commode with Modified [place] for hygiene and clothing management.   Toilet transfer to bedside commode with Modified [place].                         Time Tracking:     OT Date of Treatment: 03/22/25  OT Start Time: 0936  OT Stop Time: 1000  OT Total Time (min): 24 min    Billable Minutes:Self Care/Home Management 24    OT/LINDA: LINDA     Number of LINDA visits since last OT visit: 2    3/22/2025

## 2025-03-22 NOTE — PROGRESS NOTES
[facility] Hospital Medicine Progress Note        Chief Complaint: Inpatient Follow-up    HPI:     65-year-old female with significant history of colon polyp, anxiety/depression, type 2 diabetes mellitus, HTN, presented to the ED with complaints of bilateral lower extremity weakness.  Patient was recently evaluated in outlying facility and was found to have a thoracic spine mass with concern for cord compression, recommended admission but ended up leaving against medical advice.  Patient continued with weakness and difficulty voiding and therefore she decided to come back to the ED. MRI revealed mass along T6-T7 causing severe spinal canal narrowing, cervical disc herniation/protrusion, disc herniation at T7-T8, lumbar spinal canal narrowing at L4-L5, disc herniation L3-L4, L1-L2.  Patient admitted to hospital medicine services, neurosurgery services consulted.  Pelvis with no focus of malignancy.  Patient was taken to OR by Neurosurgery on 03/19 and she underwent thoracic spine laminectomy with excision of spinal cord lesion, awaiting path.  BP accelerated on 03/21 and therefore home verapamil was resumed, Coreg dosing decreased given borderline bradycardia      Interval Hx:   Patient seen at bedside, she is comfortably laying in bed, discussed path findings, BP accelerated and patient is significantly bradycardic with heart rate in 40s, intermittently dizzy, no other new complaints      Objective/physical exam:  General: In no acute distress, afebrile  Chest: Clear to auscultation bilaterally  Heart: S1, S2, no appreciable murmur  Abdomen: Soft, nontender, BS +  MSK: Warm, no lower extremity edema, no clubbing or cyanosis  Neurologic: Alert and oriented x4, paraplegia  VITAL SIGNS: 24 HRS MIN & MAX LAST   Temp  Min: 97.6 °F (36.4 °C)  Max: 99.1 °F (37.3 °C) 98.1 °F (36.7 °C)   BP  Min: 156/73  Max: 182/81 (!) 162/88   Pulse  Min: 59  Max: 102  64   Resp  Min: 16  Max: 18 18   SpO2   Min: 91 %  Max: 97 % (!) 91 %       Recent Labs   Lab 03/21/25  0453   WBC 10.67   RBC 3.93*   HGB 12.3   HCT 36.8*   MCV 93.6   MCH 31.3*   MCHC 33.4   RDW 12.7      MPV 9.2         Recent Labs   Lab 03/19/25  0554 03/20/25  0504 03/21/25  0453      < > 135*   K 3.5   < > 3.8      < > 102   CO2 24   < > 25   BUN 11.6   < > 14.0   CREATININE 0.59   < > 0.59   CALCIUM 9.1   < > 8.5   MG 1.60  --   --    ALBUMIN 3.4  --  3.3*   ALKPHOS 76  --  58   ALT 15  --  9   AST 14  --  14   BILITOT 1.0  --  1.0    < > = values in this interval not displayed.          Microbiology Results (last 7 days)       ** No results found for the last 168 hours. **             Scheduled Med:   bisacodyL  10 mg Rectal Daily    carvediloL  3.125 mg Oral BID WM    enoxparin  40 mg Subcutaneous Q24H (prophylaxis, 1700)    gabapentin  300 mg Oral TID    hydrOXYzine HCL  25 mg Oral BID    mupirocin   Nasal BID    paroxetine  20 mg Oral Daily    senna-docusate 8.6-50 mg  2 tablet Oral BID    verapamiL  240 mg Oral Daily          Assessment/Plan:    Symptomatic bradycardia  Thoracic spinal mass along T6-T7 causing severe spinal canal narrowing status post laminectomy, excision of mass-path consistent with meningioma.  Paraplegia likely secondary to above   Multilevel cervical disc herniation/multilevel lumbar spinal canal stenosis with disc herniation  History of colon polyp   Mildly elevated CEA  History of anxiety/depression   Type 2 diabetes mellitus -stable  Poorly-controlled HTN  Prophylaxis    Postop care per Neurosurgery, LUIS drain management per Neurosurgery  Pathology consistent with meningioma, no malignancy  Patient had CT chest, abdomen, pelvis with no focus of malignancy   CEA slightly elevated, she has history of colon polyps, can pursue outpatient colonoscopy after recovery   CBG stable   Continue sliding scale  Patient is severely bradycardic with heart rate dropping to 40s   Coreg and verapamil held   BP is  accelerated, initiated hydralazine 50 mg t.i.d.  EKG, echocardiogram ordered  She is also dizzy, DC scheduled Vistaril  Continue gabapentin, paroxetine  Continue bowel regimen   DVT prophylaxis-Lovenox added 3/21 after clearance from Neurosurgery      Plan for inpatient rehab placement once medically stable      Wen Romo MD   03/22/2025

## 2025-03-23 LAB
ALBUMIN SERPL-MCNC: 3.3 G/DL (ref 3.4–4.8)
ALBUMIN/GLOB SERPL: 1.1 RATIO (ref 1.1–2)
ALP SERPL-CCNC: 70 UNIT/L (ref 40–150)
ALT SERPL-CCNC: 10 UNIT/L (ref 0–55)
ANION GAP SERPL CALC-SCNC: 9 MEQ/L
APICAL FOUR CHAMBER EJECTION FRACTION: 60 %
APICAL TWO CHAMBER EJECTION FRACTION: 67 %
AST SERPL-CCNC: 16 UNIT/L (ref 11–45)
AV INDEX (PROSTH): 0.92
AV MEAN GRADIENT: 4 MMHG
AV PEAK GRADIENT: 8 MMHG
AV VALVE AREA BY VELOCITY RATIO: 3.3 CM²
AV VALVE AREA: 3.5 CM²
AV VELOCITY RATIO: 0.86
BASOPHILS # BLD AUTO: 0.07 X10(3)/MCL
BASOPHILS NFR BLD AUTO: 0.6 %
BILIRUB SERPL-MCNC: 0.8 MG/DL
BSA FOR ECHO PROCEDURE: 2.43 M2
BUN SERPL-MCNC: 14.8 MG/DL (ref 9.8–20.1)
CALCIUM SERPL-MCNC: 9 MG/DL (ref 8.4–10.2)
CHLORIDE SERPL-SCNC: 98 MMOL/L (ref 98–107)
CO2 SERPL-SCNC: 29 MMOL/L (ref 23–31)
CREAT SERPL-MCNC: 0.55 MG/DL (ref 0.55–1.02)
CREAT/UREA NIT SERPL: 27
CV ECHO LV RWT: 0.6 CM
D DIMER PPP IA.FEU-MCNC: 1.69 UG/ML FEU (ref 0–0.5)
DOP CALC AO PEAK VEL: 1.4 M/S
DOP CALC AO VTI: 17 CM
DOP CALC LVOT AREA: 3.8 CM2
DOP CALC LVOT DIAMETER: 2.2 CM
DOP CALC LVOT PEAK VEL: 1.2 M/S
DOP CALC LVOT STROKE VOLUME: 59.7 CM3
DOP CALC MV VTI: 23.1 CM
DOP CALCLVOT PEAK VEL VTI: 15.7 CM
ECHO LV POSTERIOR WALL: 1.2 CM (ref 0.6–1.1)
EOSINOPHIL # BLD AUTO: 0.24 X10(3)/MCL (ref 0–0.9)
EOSINOPHIL NFR BLD AUTO: 2.1 %
ERYTHROCYTE [DISTWIDTH] IN BLOOD BY AUTOMATED COUNT: 12.4 % (ref 11.5–17)
FRACTIONAL SHORTENING: 20 % (ref 28–44)
GFR SERPLBLD CREATININE-BSD FMLA CKD-EPI: >60 ML/MIN/1.73/M2
GLOBULIN SER-MCNC: 2.9 GM/DL (ref 2.4–3.5)
GLUCOSE SERPL-MCNC: 180 MG/DL (ref 82–115)
HCT VFR BLD AUTO: 42.1 % (ref 37–47)
HGB BLD-MCNC: 14.1 G/DL (ref 12–16)
IMM GRANULOCYTES # BLD AUTO: 0.05 X10(3)/MCL (ref 0–0.04)
IMM GRANULOCYTES NFR BLD AUTO: 0.4 %
INTERVENTRICULAR SEPTUM: 1.6 CM (ref 0.6–1.1)
LEFT ATRIUM AREA SYSTOLIC (APICAL 2 CHAMBER): 24 CM2
LEFT ATRIUM AREA SYSTOLIC (APICAL 4 CHAMBER): 25.2 CM2
LEFT ATRIUM SIZE: 4.4 CM
LEFT ATRIUM VOLUME INDEX MOD: 33 ML/M2
LEFT ATRIUM VOLUME MOD: 77 ML
LEFT INTERNAL DIMENSION IN SYSTOLE: 3.2 CM (ref 2.1–4)
LEFT VENTRICLE DIASTOLIC VOLUME INDEX: 29.66 ML/M2
LEFT VENTRICLE DIASTOLIC VOLUME: 70 ML
LEFT VENTRICLE END DIASTOLIC VOLUME APICAL 2 CHAMBER: 42.5 ML
LEFT VENTRICLE END DIASTOLIC VOLUME APICAL 4 CHAMBER: 37.2 ML
LEFT VENTRICLE END SYSTOLIC VOLUME APICAL 2 CHAMBER: 64 ML
LEFT VENTRICLE END SYSTOLIC VOLUME APICAL 4 CHAMBER: 80.8 ML
LEFT VENTRICLE MASS INDEX: 88.5 G/M2
LEFT VENTRICLE SYSTOLIC VOLUME INDEX: 17.4 ML/M2
LEFT VENTRICLE SYSTOLIC VOLUME: 41 ML
LEFT VENTRICULAR INTERNAL DIMENSION IN DIASTOLE: 4 CM (ref 3.5–6)
LEFT VENTRICULAR MASS: 209 G
LVED V (TEICH): 70 ML
LVES V (TEICH): 41 ML
LVOT MG: 3 MMHG
LVOT MV: 0.79 CM/S
LYMPHOCYTES # BLD AUTO: 1.6 X10(3)/MCL (ref 0.6–4.6)
LYMPHOCYTES NFR BLD AUTO: 14.1 %
MCH RBC QN AUTO: 30.9 PG (ref 27–31)
MCHC RBC AUTO-ENTMCNC: 33.5 G/DL (ref 33–36)
MCV RBC AUTO: 92.1 FL (ref 80–94)
MONOCYTES # BLD AUTO: 0.72 X10(3)/MCL (ref 0.1–1.3)
MONOCYTES NFR BLD AUTO: 6.3 %
MV MEAN GRADIENT: 5 MMHG
MV PEAK GRADIENT: 13 MMHG
MV VALVE AREA BY CONTINUITY EQUATION: 2.58 CM2
NEUTROPHILS # BLD AUTO: 8.66 X10(3)/MCL (ref 2.1–9.2)
NEUTROPHILS NFR BLD AUTO: 76.5 %
NRBC BLD AUTO-RTO: 0 %
OHS CV RV/LV RATIO: 0.75 CM
OHS LV EJECTION FRACTION SIMPSONS BIPLANE MOD: 64 %
PISA TR MAX VEL: 1.8 M/S
PLATELET # BLD AUTO: 312 X10(3)/MCL (ref 130–400)
PMV BLD AUTO: 9.2 FL (ref 7.4–10.4)
POCT GLUCOSE: 114 MG/DL (ref 70–110)
POCT GLUCOSE: 114 MG/DL (ref 70–110)
POCT GLUCOSE: 133 MG/DL (ref 70–110)
POCT GLUCOSE: 164 MG/DL (ref 70–110)
POCT GLUCOSE: 192 MG/DL (ref 70–110)
POTASSIUM SERPL-SCNC: 3.7 MMOL/L (ref 3.5–5.1)
PROT SERPL-MCNC: 6.2 GM/DL (ref 5.8–7.6)
PV PEAK GRADIENT: 6 MMHG
PV PEAK VELOCITY: 1.25 M/S
RA MAJOR: 5.2 CM
RA WIDTH: 5.4 CM
RBC # BLD AUTO: 4.57 X10(6)/MCL (ref 4.2–5.4)
RIGHT VENTRICLE DIASTOLIC BASEL DIMENSION: 3 CM
RIGHT VENTRICLE DIASTOLIC LENGTH: 8.2 CM
RIGHT VENTRICLE DIASTOLIC MID DIMENSION: 3.4 CM
RIGHT VENTRICULAR END-DIASTOLIC DIMENSION: 3 CM
RIGHT VENTRICULAR LENGTH IN DIASTOLE (APICAL 4-CHAMBER VIEW): 8.2 CM
RV MID DIAMA: 3.4 CM
SODIUM SERPL-SCNC: 136 MMOL/L (ref 136–145)
TDI SEPTAL: 0.1 M/S
TR MAX PG: 13 MMHG
TRICUSPID ANNULAR PLANE SYSTOLIC EXCURSION: 2.11 CM
TROPONIN I SERPL-MCNC: <0.01 NG/ML (ref 0–0.04)
WBC # BLD AUTO: 11.34 X10(3)/MCL (ref 4.5–11.5)
Z-SCORE OF LEFT VENTRICULAR DIMENSION IN END DIASTOLE: -9.2
Z-SCORE OF LEFT VENTRICULAR DIMENSION IN END SYSTOLE: -5

## 2025-03-23 PROCEDURE — 36415 COLL VENOUS BLD VENIPUNCTURE: CPT | Performed by: INTERNAL MEDICINE

## 2025-03-23 PROCEDURE — 25000003 PHARM REV CODE 250

## 2025-03-23 PROCEDURE — 85025 COMPLETE CBC W/AUTO DIFF WBC: CPT | Performed by: INTERNAL MEDICINE

## 2025-03-23 PROCEDURE — 63600175 PHARM REV CODE 636 W HCPCS: Performed by: NURSE PRACTITIONER

## 2025-03-23 PROCEDURE — 93010 ELECTROCARDIOGRAM REPORT: CPT | Mod: ,,, | Performed by: INTERNAL MEDICINE

## 2025-03-23 PROCEDURE — 84484 ASSAY OF TROPONIN QUANT: CPT | Performed by: INTERNAL MEDICINE

## 2025-03-23 PROCEDURE — 21400001 HC TELEMETRY ROOM

## 2025-03-23 PROCEDURE — 85379 FIBRIN DEGRADATION QUANT: CPT | Performed by: INTERNAL MEDICINE

## 2025-03-23 PROCEDURE — 25000003 PHARM REV CODE 250: Performed by: INTERNAL MEDICINE

## 2025-03-23 PROCEDURE — 25000003 PHARM REV CODE 250: Performed by: HOSPITALIST

## 2025-03-23 PROCEDURE — 63600175 PHARM REV CODE 636 W HCPCS: Performed by: INTERNAL MEDICINE

## 2025-03-23 PROCEDURE — 80053 COMPREHEN METABOLIC PANEL: CPT | Performed by: INTERNAL MEDICINE

## 2025-03-23 PROCEDURE — 94799 UNLISTED PULMONARY SVC/PX: CPT

## 2025-03-23 PROCEDURE — 25500020 PHARM REV CODE 255: Performed by: INTERNAL MEDICINE

## 2025-03-23 PROCEDURE — 99024 POSTOP FOLLOW-UP VISIT: CPT | Mod: ,,, | Performed by: NEUROLOGICAL SURGERY

## 2025-03-23 PROCEDURE — 11000001 HC ACUTE MED/SURG PRIVATE ROOM

## 2025-03-23 PROCEDURE — 99900035 HC TECH TIME PER 15 MIN (STAT)

## 2025-03-23 PROCEDURE — 93005 ELECTROCARDIOGRAM TRACING: CPT

## 2025-03-23 RX ORDER — METOPROLOL TARTRATE 1 MG/ML
5 INJECTION, SOLUTION INTRAVENOUS EVERY 6 HOURS PRN
Status: DISCONTINUED | OUTPATIENT
Start: 2025-03-23 | End: 2025-03-26 | Stop reason: HOSPADM

## 2025-03-23 RX ORDER — METOPROLOL TARTRATE 25 MG/1
25 TABLET, FILM COATED ORAL 2 TIMES DAILY
Status: DISCONTINUED | OUTPATIENT
Start: 2025-03-23 | End: 2025-03-24

## 2025-03-23 RX ORDER — METHOCARBAMOL 750 MG/1
750 TABLET, FILM COATED ORAL 4 TIMES DAILY PRN
Status: DISCONTINUED | OUTPATIENT
Start: 2025-03-23 | End: 2025-03-26 | Stop reason: HOSPADM

## 2025-03-23 RX ORDER — METOPROLOL TARTRATE 1 MG/ML
5 INJECTION, SOLUTION INTRAVENOUS EVERY 4 HOURS PRN
Status: DISCONTINUED | OUTPATIENT
Start: 2025-03-23 | End: 2025-03-26 | Stop reason: HOSPADM

## 2025-03-23 RX ADMIN — ENOXAPARIN SODIUM 40 MG: 40 INJECTION SUBCUTANEOUS at 05:03

## 2025-03-23 RX ADMIN — HYDROCODONE BITARTRATE AND ACETAMINOPHEN 1 TABLET: 10; 325 TABLET ORAL at 07:03

## 2025-03-23 RX ADMIN — METOPROLOL TARTRATE 25 MG: 25 TABLET, FILM COATED ORAL at 12:03

## 2025-03-23 RX ADMIN — GABAPENTIN 300 MG: 300 CAPSULE ORAL at 08:03

## 2025-03-23 RX ADMIN — IOHEXOL 100 ML: 350 INJECTION, SOLUTION INTRAVENOUS at 08:03

## 2025-03-23 RX ADMIN — INSULIN ASPART 2 UNITS: 100 INJECTION, SOLUTION INTRAVENOUS; SUBCUTANEOUS at 05:03

## 2025-03-23 RX ADMIN — ONDANSETRON 4 MG: 4 TABLET, ORALLY DISINTEGRATING ORAL at 11:03

## 2025-03-23 RX ADMIN — SENNOSIDES AND DOCUSATE SODIUM 2 TABLET: 50; 8.6 TABLET ORAL at 08:03

## 2025-03-23 RX ADMIN — HYDROCODONE BITARTRATE AND ACETAMINOPHEN 1 TABLET: 5; 325 TABLET ORAL at 08:03

## 2025-03-23 RX ADMIN — PAROXETINE HYDROCHLORIDE 20 MG: 20 TABLET, FILM COATED ORAL at 08:03

## 2025-03-23 RX ADMIN — METOPROLOL TARTRATE 25 MG: 25 TABLET, FILM COATED ORAL at 08:03

## 2025-03-23 RX ADMIN — MUPIROCIN: 20 OINTMENT TOPICAL at 09:03

## 2025-03-23 RX ADMIN — GABAPENTIN 300 MG: 300 CAPSULE ORAL at 03:03

## 2025-03-23 RX ADMIN — INSULIN ASPART 2 UNITS: 100 INJECTION, SOLUTION INTRAVENOUS; SUBCUTANEOUS at 11:03

## 2025-03-23 RX ADMIN — HYDRALAZINE HYDROCHLORIDE 50 MG: 50 TABLET ORAL at 06:03

## 2025-03-23 RX ADMIN — METHOCARBAMOL 750 MG: 750 TABLET ORAL at 08:03

## 2025-03-23 NOTE — PROGRESS NOTES
[facility] Neuro  Neurosurgery  Progress Note    Subjective:     Interval History:   POD#4 T6-9 laminectomy, durotomy, and resection of intradural extramedullary tumor    Patient seen with family at bedside.  Reports improvement to numbness tingling and pain that was present prior to surgical intervention to lower extremities including bilateral feet.  Continues with weakness to bilateral lower extremities. Able to bend at knees today.  Denies headaches, nausea and vomiting, or dizziness. Per nurse increase in muscle spasms over last 24 hours.       Post-Op Info:  Procedure(s) (LRB):  LAMINECTOMY, SPINE, THORACIC (N/A)  LAMINECTOMY, SPINE, THORACIC, POSTERIOR APPROACH, WITH EXCISION OF SPINAL CORD LESION   4 Days Post-Op      Medications:  Continuous Infusions:  Scheduled Meds:   bisacodyL  10 mg Rectal Daily    enoxparin  40 mg Subcutaneous Q24H (prophylaxis, 1700)    gabapentin  300 mg Oral TID    hydrALAZINE  50 mg Oral Q8H    mupirocin   Nasal BID    paroxetine  20 mg Oral Daily    senna-docusate 8.6-50 mg  2 tablet Oral BID     PRN Meds:  Current Facility-Administered Medications:     acetaminophen, 650 mg, Oral, Q6H PRN    acetaminophen, 650 mg, Oral, Q4H PRN    aluminum-magnesium hydroxide-simethicone, 30 mL, Oral, Q4H PRN    bisacodyL, 10 mg, Rectal, Daily PRN    calcium carbonate, 500 mg, Oral, Daily PRN    dextrose 50%, 12.5 g, Intravenous, PRN    glucagon (human recombinant), 1 mg, Intramuscular, PRN    hydrALAZINE, 10 mg, Intravenous, Q4H PRN    HYDROcodone-acetaminophen, 1 tablet, Oral, Q4H PRN    HYDROcodone-acetaminophen, 1 tablet, Oral, Q4H PRN    insulin aspart U-100, 0-10 Units, Subcutaneous, Q6H PRN    melatonin, 6 mg, Oral, Nightly PRN    morphine, 1 mg, Intravenous, Q1H PRN    morphine, 2 mg, Intravenous, Q1H PRN    morphine, 4 mg, Intravenous, Q3H PRN    naloxone, 0.02 mg, Intravenous, PRN    ondansetron, 4 mg, Oral, Q4H PRN    oxyCODONE-acetaminophen, 1 tablet, Oral, Q4H  "PRN    prochlorperazine, 5 mg, Intravenous, Q6H PRN    senna-docusate 8.6-50 mg, 1 tablet, Oral, BID PRN    sodium chloride 0.9%, 10 mL, Intravenous, PRN     Review of Systems  Objective:     Weight: 117.9 kg (259 lb 14.8 oz)  Body mass index is 36.25 kg/m².  Vital Signs (Most Recent):  Temp: 98.2 °F (36.8 °C) (03/23/25 0806)  Pulse: (!) 122 (03/23/25 0806)  Resp: 20 (03/23/25 0806)  BP: 131/71 (03/23/25 0806)  SpO2: (!) 94 % (03/23/25 0806) Vital Signs (24h Range):  Temp:  [97.8 °F (36.6 °C)-98.2 °F (36.8 °C)] 98.2 °F (36.8 °C)  Pulse:  [] 122  Resp:  [16-20] 20  SpO2:  [91 %-97 %] 94 %  BP: (127-162)/(61-79) 131/71                Closed/Suction Drain 03/19/25 1646 Tube - 1 Medial;Posterior Back Bulb 10 Fr. (Active)   Site Description Unable to view 03/22/25 0800   Dressing Type Gauze 03/22/25 0800   Dressing Status Old drainage 03/22/25 0800   Dressing Intervention Integrity maintained 03/22/25 0800   Drainage Serosanguineous 03/22/25 0800   Status Open to gravity drainage 03/22/25 0800   Output (mL) 10 mL 03/21/25 1708       Neurosurgery Physical Exam    GCS 15   Fully oriented to all spheres.    Follows commands   No facial droop, no speech issues.    Moves all extremities well  With the exception of proximal weakness to bilateral lower extremities  Sensation intact with exception of decreased sensation to bilateral feet  No gross visual issues.    Cranial nerves grossly intact   No pronator drift      Significant Labs:  Recent Labs   Lab 03/23/25  0931      K 3.7   CL 98   CO2 29   BUN 14.8   CREATININE 0.55   CALCIUM 9.0     Recent Labs   Lab 03/23/25  0931   WBC 11.34   HGB 14.1   HCT 42.1        No results for input(s): "LABPT", "INR", "APTT" in the last 48 hours.  Microbiology Results (last 7 days)       ** No results found for the last 168 hours. **            Significant Diagnostics:      Assessment/Plan:  POD#4 T6-9 laminectomy, durotomy, and resection of intradural extramedullary " tumor    Floor status  Neuro checks Q4  Dressing changes PRN saturation  MM pain control  Added robaxin  Encouraged PT/OT  CM consulted for discharge planning, possible rehab placement  SCDs for DVT  Fall precautions         There are no hospital problems to display for this patient.      ERNESTO Beckwith  Neurosurgery  [facility]

## 2025-03-23 NOTE — PROGRESS NOTES
[facility] Hospital Medicine Progress Note        Chief Complaint: Inpatient Follow-up    HPI:     65-year-old female with significant history of colon polyp, anxiety/depression, type 2 diabetes mellitus, HTN, presented to the ED with complaints of bilateral lower extremity weakness.  Patient was recently evaluated in outlying facility and was found to have a thoracic spine mass with concern for cord compression, recommended admission but ended up leaving against medical advice.  Patient continued with weakness and difficulty voiding and therefore she decided to come back to the ED. MRI revealed mass along T6-T7 causing severe spinal canal narrowing, cervical disc herniation/protrusion, disc herniation at T7-T8, lumbar spinal canal narrowing at L4-L5, disc herniation L3-L4, L1-L2.  Patient admitted to hospital medicine services, neurosurgery services consulted.  Pelvis with no focus of malignancy.  Patient was taken to OR by Neurosurgery on 03/19 and she underwent thoracic spine laminectomy with excision of spinal cord lesion, awaiting path.  BP accelerated on 03/21 and therefore home verapamil was resumed, Coreg dosing decreased given borderline bradycardia.  Beta-blocker had to be held on 03/22 given severe bradycardia with heart rate dropping to 40s, BP poorly controlled and therefore added hydralazine.  Pathology came back-meningioma.  Given significant bradycardia verapamil also had to be discontinued      Interval Hx:   Patient seen at bedside, normotensive today, but tachycardic.  Saturations low 90s.  Family member is at bedside today, no change in neurological status, no new complaints     Objective/physical exam:  General: In no acute distress, afebrile  Chest: Clear to auscultation bilaterally  Heart: S1, S2, no appreciable murmur  Abdomen: Soft, nontender, BS +  MSK: Warm, no lower extremity edema, no clubbing or cyanosis  Neurologic: Alert and oriented x4,  paraplegia  VITAL SIGNS: 24 HRS MIN & MAX LAST   Temp  Min: 97.6 °F (36.4 °C)  Max: 98.2 °F (36.8 °C) 98.2 °F (36.8 °C)   BP  Min: 124/57  Max: 162/79 131/71   Pulse  Min: 45  Max: 122  (!) 122   Resp  Min: 16  Max: 20 20   SpO2  Min: 91 %  Max: 97 % (!) 94 %       Recent Labs   Lab 03/21/25  0453   WBC 10.67   RBC 3.93*   HGB 12.3   HCT 36.8*   MCV 93.6   MCH 31.3*   MCHC 33.4   RDW 12.7      MPV 9.2         Recent Labs   Lab 03/19/25  0554 03/20/25  0504 03/21/25  0453      < > 135*   K 3.5   < > 3.8      < > 102   CO2 24   < > 25   BUN 11.6   < > 14.0   CREATININE 0.59   < > 0.59   CALCIUM 9.1   < > 8.5   MG 1.60  --   --    ALBUMIN 3.4  --  3.3*   ALKPHOS 76  --  58   ALT 15  --  9   AST 14  --  14   BILITOT 1.0  --  1.0    < > = values in this interval not displayed.          Microbiology Results (last 7 days)       ** No results found for the last 168 hours. **             Scheduled Med:   bisacodyL  10 mg Rectal Daily    enoxparin  40 mg Subcutaneous Q24H (prophylaxis, 1700)    gabapentin  300 mg Oral TID    hydrALAZINE  50 mg Oral Q8H    mupirocin   Nasal BID    paroxetine  20 mg Oral Daily    senna-docusate 8.6-50 mg  2 tablet Oral BID          Assessment/Plan:    Labile heart rate-now tachycardic  LVH (+peterson/Romhiltestes criteria)  Thoracic spinal mass along T6-T7 causing severe spinal canal narrowing status post laminectomy, excision of mass-path consistent with meningioma.  Paraplegia likely secondary to above   Multilevel cervical disc herniation/multilevel lumbar spinal canal stenosis with disc herniation  History of colon polyp   Mildly elevated CEA  History of anxiety/depression   Type 2 diabetes mellitus -stable  Essential HTN-stable  Prophylaxis      Hemodynamics very labile, bradycardic yesterday and is now tachycardic   Coreg, verapamil on hold given bradycardia   I will initiate her on Lopressor 25 mg b.i.d. for tachycardia today  Saturation low 90s   D-dimer slightly  elevated, but not impressive   Venous ultrasound to rule out DVT   Low suspicion for PE since respiratory status is stable on room air  Follow up troponin, echocardiogram  EKG with positive criteria for LVH, based on echo I will decide if Cardiology needs to be consulted  Pathology consistent with meningioma, no malignancy  Patient had CT chest, abdomen, pelvis with no focus of malignancy   CEA slightly elevated, she has history of colon polyps, can pursue outpatient colonoscopy after recovery   CBG stable   Continue sliding scale  Continue gabapentin, paroxetine  Continue bowel regimen   DVT prophylaxis-Lovenox added 3/21 after clearance from Neurosurgery      Plan for inpatient rehab placement once medically stable      Wen Romo MD   03/23/2025     Update   Patient now in AFib, new onset   Consult cardiology   Twelve lead ordered   Trop is negative  In the setting of new onset AFib, high D-dimer tachycardia, low normal sats I will order stat CT angiogram to rule out PE

## 2025-03-24 LAB
ALBUMIN SERPL-MCNC: 3.2 G/DL (ref 3.4–4.8)
ALBUMIN/GLOB SERPL: 0.9 RATIO (ref 1.1–2)
ALP SERPL-CCNC: 63 UNIT/L (ref 40–150)
ALT SERPL-CCNC: 9 UNIT/L (ref 0–55)
ANION GAP SERPL CALC-SCNC: 11 MEQ/L
AST SERPL-CCNC: 14 UNIT/L (ref 11–45)
BASOPHILS # BLD AUTO: 0.07 X10(3)/MCL
BASOPHILS NFR BLD AUTO: 0.8 %
BILIRUB SERPL-MCNC: 0.6 MG/DL
BUN SERPL-MCNC: 14.4 MG/DL (ref 9.8–20.1)
CALCIUM SERPL-MCNC: 9.3 MG/DL (ref 8.4–10.2)
CHLORIDE SERPL-SCNC: 98 MMOL/L (ref 98–107)
CO2 SERPL-SCNC: 29 MMOL/L (ref 23–31)
CREAT SERPL-MCNC: 0.61 MG/DL (ref 0.55–1.02)
CREAT/UREA NIT SERPL: 24
EOSINOPHIL # BLD AUTO: 0.41 X10(3)/MCL (ref 0–0.9)
EOSINOPHIL NFR BLD AUTO: 4.6 %
ERYTHROCYTE [DISTWIDTH] IN BLOOD BY AUTOMATED COUNT: 12.5 % (ref 11.5–17)
GFR SERPLBLD CREATININE-BSD FMLA CKD-EPI: >60 ML/MIN/1.73/M2
GLOBULIN SER-MCNC: 3.4 GM/DL (ref 2.4–3.5)
GLUCOSE SERPL-MCNC: 126 MG/DL (ref 82–115)
HCT VFR BLD AUTO: 39.7 % (ref 37–47)
HGB BLD-MCNC: 13.5 G/DL (ref 12–16)
IMM GRANULOCYTES # BLD AUTO: 0.03 X10(3)/MCL (ref 0–0.04)
IMM GRANULOCYTES NFR BLD AUTO: 0.3 %
LYMPHOCYTES # BLD AUTO: 2.26 X10(3)/MCL (ref 0.6–4.6)
LYMPHOCYTES NFR BLD AUTO: 25.1 %
MCH RBC QN AUTO: 31.1 PG (ref 27–31)
MCHC RBC AUTO-ENTMCNC: 34 G/DL (ref 33–36)
MCV RBC AUTO: 91.5 FL (ref 80–94)
MONOCYTES # BLD AUTO: 0.75 X10(3)/MCL (ref 0.1–1.3)
MONOCYTES NFR BLD AUTO: 8.3 %
NEUTROPHILS # BLD AUTO: 5.47 X10(3)/MCL (ref 2.1–9.2)
NEUTROPHILS NFR BLD AUTO: 60.9 %
NRBC BLD AUTO-RTO: 0 %
OHS QRS DURATION: 114 MS
OHS QRS DURATION: 118 MS
OHS QRS DURATION: 124 MS
OHS QTC CALCULATION: 424 MS
OHS QTC CALCULATION: 436 MS
OHS QTC CALCULATION: 437 MS
PLATELET # BLD AUTO: 322 X10(3)/MCL (ref 130–400)
PMV BLD AUTO: 9.4 FL (ref 7.4–10.4)
POCT GLUCOSE: 152 MG/DL (ref 70–110)
POCT GLUCOSE: 160 MG/DL (ref 70–110)
POTASSIUM SERPL-SCNC: 3.9 MMOL/L (ref 3.5–5.1)
PROT SERPL-MCNC: 6.6 GM/DL (ref 5.8–7.6)
RBC # BLD AUTO: 4.34 X10(6)/MCL (ref 4.2–5.4)
SODIUM SERPL-SCNC: 138 MMOL/L (ref 136–145)
WBC # BLD AUTO: 8.99 X10(3)/MCL (ref 4.5–11.5)

## 2025-03-24 PROCEDURE — 36415 COLL VENOUS BLD VENIPUNCTURE: CPT | Performed by: INTERNAL MEDICINE

## 2025-03-24 PROCEDURE — 80053 COMPREHEN METABOLIC PANEL: CPT | Performed by: INTERNAL MEDICINE

## 2025-03-24 PROCEDURE — 94799 UNLISTED PULMONARY SVC/PX: CPT

## 2025-03-24 PROCEDURE — 25000003 PHARM REV CODE 250: Performed by: HOSPITALIST

## 2025-03-24 PROCEDURE — 99900035 HC TECH TIME PER 15 MIN (STAT)

## 2025-03-24 PROCEDURE — 97530 THERAPEUTIC ACTIVITIES: CPT | Mod: CQ

## 2025-03-24 PROCEDURE — 97535 SELF CARE MNGMENT TRAINING: CPT | Mod: CO

## 2025-03-24 PROCEDURE — 25000003 PHARM REV CODE 250

## 2025-03-24 PROCEDURE — 21400001 HC TELEMETRY ROOM

## 2025-03-24 PROCEDURE — 93005 ELECTROCARDIOGRAM TRACING: CPT

## 2025-03-24 PROCEDURE — 99900031 HC PATIENT EDUCATION (STAT)

## 2025-03-24 PROCEDURE — 63600175 PHARM REV CODE 636 W HCPCS: Performed by: INTERNAL MEDICINE

## 2025-03-24 PROCEDURE — 93010 ELECTROCARDIOGRAM REPORT: CPT | Mod: ,,, | Performed by: INTERNAL MEDICINE

## 2025-03-24 PROCEDURE — 25000003 PHARM REV CODE 250: Performed by: INTERNAL MEDICINE

## 2025-03-24 PROCEDURE — 97116 GAIT TRAINING THERAPY: CPT | Mod: CQ

## 2025-03-24 PROCEDURE — 85025 COMPLETE CBC W/AUTO DIFF WBC: CPT | Performed by: INTERNAL MEDICINE

## 2025-03-24 PROCEDURE — 63600175 PHARM REV CODE 636 W HCPCS: Performed by: NURSE PRACTITIONER

## 2025-03-24 RX ORDER — ENOXAPARIN SODIUM 100 MG/ML
80 INJECTION SUBCUTANEOUS ONCE
Status: COMPLETED | OUTPATIENT
Start: 2025-03-24 | End: 2025-03-24

## 2025-03-24 RX ORDER — AMLODIPINE BESYLATE 5 MG/1
10 TABLET ORAL DAILY
Status: DISCONTINUED | OUTPATIENT
Start: 2025-03-24 | End: 2025-03-26 | Stop reason: HOSPADM

## 2025-03-24 RX ADMIN — INSULIN ASPART 2 UNITS: 100 INJECTION, SOLUTION INTRAVENOUS; SUBCUTANEOUS at 05:03

## 2025-03-24 RX ADMIN — HYDROCODONE BITARTRATE AND ACETAMINOPHEN 1 TABLET: 10; 325 TABLET ORAL at 05:03

## 2025-03-24 RX ADMIN — METOPROLOL TARTRATE 25 MG: 25 TABLET, FILM COATED ORAL at 09:03

## 2025-03-24 RX ADMIN — AMLODIPINE BESYLATE 10 MG: 5 TABLET ORAL at 06:03

## 2025-03-24 RX ADMIN — HYDRALAZINE HYDROCHLORIDE 10 MG: 20 INJECTION INTRAMUSCULAR; INTRAVENOUS at 04:03

## 2025-03-24 RX ADMIN — GABAPENTIN 300 MG: 300 CAPSULE ORAL at 09:03

## 2025-03-24 RX ADMIN — HYDRALAZINE HYDROCHLORIDE 10 MG: 20 INJECTION INTRAMUSCULAR; INTRAVENOUS at 10:03

## 2025-03-24 RX ADMIN — HYDROCODONE BITARTRATE AND ACETAMINOPHEN 1 TABLET: 5; 325 TABLET ORAL at 06:03

## 2025-03-24 RX ADMIN — SENNOSIDES AND DOCUSATE SODIUM 2 TABLET: 50; 8.6 TABLET ORAL at 10:03

## 2025-03-24 RX ADMIN — GABAPENTIN 300 MG: 300 CAPSULE ORAL at 10:03

## 2025-03-24 RX ADMIN — HYDROCODONE BITARTRATE AND ACETAMINOPHEN 1 TABLET: 5; 325 TABLET ORAL at 11:03

## 2025-03-24 RX ADMIN — BISACODYL 10 MG: 10 SUPPOSITORY RECTAL at 09:03

## 2025-03-24 RX ADMIN — SENNOSIDES AND DOCUSATE SODIUM 2 TABLET: 50; 8.6 TABLET ORAL at 09:03

## 2025-03-24 RX ADMIN — PAROXETINE HYDROCHLORIDE 20 MG: 20 TABLET, FILM COATED ORAL at 09:03

## 2025-03-24 RX ADMIN — ENOXAPARIN SODIUM 80 MG: 80 INJECTION SUBCUTANEOUS at 06:03

## 2025-03-24 RX ADMIN — GABAPENTIN 300 MG: 300 CAPSULE ORAL at 03:03

## 2025-03-24 RX ADMIN — ENOXAPARIN SODIUM 40 MG: 40 INJECTION SUBCUTANEOUS at 04:03

## 2025-03-24 RX ADMIN — METOPROLOL TARTRATE 5 MG: 1 INJECTION, SOLUTION INTRAVENOUS at 11:03

## 2025-03-24 NOTE — PROGRESS NOTES
[facility] Hospital Medicine Progress Note        Chief Complaint: Inpatient Follow-up    HPI:     65-year-old female with significant history of colon polyp, anxiety/depression, type 2 diabetes mellitus, HTN, presented to the ED with complaints of bilateral lower extremity weakness.  Patient was recently evaluated in outlying facility and was found to have a thoracic spine mass with concern for cord compression, recommended admission but ended up leaving against medical advice.  Patient continued with weakness and difficulty voiding and therefore she decided to come back to the ED. MRI revealed mass along T6-T7 causing severe spinal canal narrowing, cervical disc herniation/protrusion, disc herniation at T7-T8, lumbar spinal canal narrowing at L4-L5, disc herniation L3-L4, L1-L2.  Patient admitted to hospital medicine services, neurosurgery services consulted.  Pelvis with no focus of malignancy.  Patient was taken to OR by Neurosurgery on 03/19 and she underwent thoracic spine laminectomy with excision of spinal cord lesion, awaiting path.  BP accelerated on 03/21 and therefore home verapamil was resumed, Coreg dosing decreased given borderline bradycardia.  Beta-blocker had to be held on 03/22 given severe bradycardia with heart rate dropping to 40s, BP poorly controlled and therefore added hydralazine.  Pathology came back-meningioma.  Given significant bradycardia verapamil also had to be discontinued.  Hydralazine was added for accelerated BP, patient became tachycardic on 03/23, hydralazine discontinued, patient was placed on Lopressor, echo non impressive, later in the evening she went into AFib, but rate was controlled, decided to consult Cardiology    Interval Hx:   Patient seen at bedside, please accelerated today and now she is bradycardic after being on Lopressor, patient is not symptomatic, no acute events overnight   Objective/physical exam:  General: In no acute  distress, afebrile  Chest: Clear to auscultation bilaterally  Heart: S1, S2, no appreciable murmur  Abdomen: Soft, nontender, BS +  MSK: Warm, no lower extremity edema, no clubbing or cyanosis  Neurologic: Alert and oriented x4, paraplegia  VITAL SIGNS: 24 HRS MIN & MAX LAST   Temp  Min: 97.5 °F (36.4 °C)  Max: 98.5 °F (36.9 °C) 98.1 °F (36.7 °C)   BP  Min: 117/67  Max: 162/71 (!) 145/65   Pulse  Min: 55  Max: 129  65   Resp  Min: 16  Max: 20 16   SpO2  Min: 89 %  Max: 94 % (!) 92 %       Recent Labs   Lab 03/24/25  0428   WBC 8.99   RBC 4.34   HGB 13.5   HCT 39.7   MCV 91.5   MCH 31.1*   MCHC 34.0   RDW 12.5      MPV 9.4         Recent Labs   Lab 03/19/25  0554 03/20/25  0504 03/24/25  0428      < > 138   K 3.5   < > 3.9      < > 98   CO2 24   < > 29   BUN 11.6   < > 14.4   CREATININE 0.59   < > 0.61   CALCIUM 9.1   < > 9.3   MG 1.60  --   --    ALBUMIN 3.4   < > 3.2*   ALKPHOS 76   < > 63   ALT 15   < > 9   AST 14   < > 14   BILITOT 1.0   < > 0.6    < > = values in this interval not displayed.          Microbiology Results (last 7 days)       ** No results found for the last 168 hours. **             Scheduled Med:   bisacodyL  10 mg Rectal Daily    enoxparin  40 mg Subcutaneous Q24H (prophylaxis, 1700)    gabapentin  300 mg Oral TID    metoprolol tartrate  25 mg Oral BID    mupirocin   Nasal BID    paroxetine  20 mg Oral Daily    senna-docusate 8.6-50 mg  2 tablet Oral BID          Assessment/Plan:    New onset AFib   Tachy-corin syndrome  LVH (+peterson/Romhiltestes criteria)  Elevated G-fppte-qeyqw out PE/DVT  Thoracic spinal mass along T6-T7 causing severe spinal canal narrowing status post laminectomy, excision of mass-path consistent with meningioma.  Paraplegia likely secondary to above   Multilevel cervical disc herniation/multilevel lumbar spinal canal stenosis with disc herniation  History of colon polyp   Mildly elevated CEA  History of anxiety/depression   Type 2 diabetes mellitus  -stable  Essential HTN-stable  Prophylaxis    Patient with very labile heart rate, on and off tachycardia/bradycardia   Patient was bradycardic following which Coreg and verapamil discontinued  Added hydralazine following which she became tachycardic and hydralazine was discontinued and she was placed on Lopressor, now she is bradycardic   I held Lopressor  Amlodipine for accelerated BP   Cardiology evaluated for new onset AFib   Echo is non impressive   Cardiology recommends low-dose Lopressor as hemodynamics tolerates  She also needs anticoagulation for thromboembolic prophylaxis   I obtained clearance from Neurosurgery for full-dose anticoagulation  She already received Lovenox 40 mg in the evening as prophylactic dose, added another 80 mg x 1 time dose tonight and then will start Eliquis 5 mg b.i.d. tomorrow  D-dimer elevated, PE/DVT ruled out  Pathology consistent with meningioma, no malignancy  Patient had CT chest, abdomen, pelvis with no focus of malignancy   CEA slightly elevated, she has history of colon polyps, can pursue outpatient colonoscopy after recovery   CBG stable   Continue sliding scale  Continue gabapentin, paroxetine  Continue bowel regimen   DVT prophylaxis-Lovenox added 3/21 after clearance from Neurosurgery    Placement is going to be challenging since she does not have insurance, she is pending Medicaid, patient would like to be closer to home, once her hemodynamics stabilizes we can possibly try transferring her back to [facility]      Wen Romo MD   03/24/2025

## 2025-03-24 NOTE — PLAN OF CARE
Met with pt at bedside to update on POC. Explained that pt is still pending Medicaid, so SW unable to arrange for post-acute placement at this time. Informed her that we will likely work on transferring her back to [facility] for remainder of therapy/care. Pt verb understanding.     Vi Lacy, KERENW

## 2025-03-24 NOTE — PT/OT/SLP PROGRESS
Physical Therapy Treatment    Patient Name:  Brittany Ordonez   MRN:  [id number]    Recommendations:     Discharge therapy intensity: High Intensity Therapy   Discharge Equipment Recommendations: to be determined by next level of care  Barriers to discharge: Impaired mobility    Assessment:     Brittany Ordonez is a 65 y.o. female admitted with a medical diagnosis of  spinal mass with cord compression. S/p laminectomy @ T6, T7, and T8. Prior to admit, patient lived with spouse and brother in a [facility] with a ramp. At baseline, she is independent and ambulates with a rollator & RW.  .  She presents with the following impairments/functional limitations: weakness, impaired endurance, impaired self care skills, impaired functional mobility, gait instability, impaired balance, decreased safety awareness, pain .    Pt able to amb 4ft/10ft with RW Freddy.     Rehab Prognosis: Good; patient would benefit from acute skilled PT services to address these deficits and reach maximum level of function.    Recent Surgery: Procedure(s) (LRB):  LAMINECTOMY, SPINE, THORACIC (N/A)  LAMINECTOMY, SPINE, THORACIC, POSTERIOR APPROACH, WITH EXCISION OF SPINAL CORD LESION 5 Days Post-Op    Plan:     During this hospitalization, patient would benefit from acute PT services 6 x/week to address the identified rehab impairments via gait training, therapeutic activities, therapeutic exercises, neuromuscular re-education and progress toward the following goals:    Plan of Care Expires:  04/20/25    Subjective     Chief Complaint:   Patient/Family Comments/goals:   Pain/Comfort:  Location 1: back  Pain Addressed 1: Reposition, Distraction      Objective:     Communicated with NSG prior to session.  Patient found HOB elevated with PureWick upon PT entry to room.     General Precautions: Standard, fall  Orthopedic Precautions: spinal precautions  Braces: N/A  Respiratory Status: Room air  Blood Pressure:   Skin Integrity:       Functional Mobility:  Bed  Mobility:     Scooting: stand by assistance and    Supine to Sit: stand by assistance  Transfers:     Sit to Stand:  minimum assistance with rolling walker and 1 trial from EOB, 1 trial from BSC, 1 trial from bedside chair   Gait: pt amb 4ft/10ft with RW Freddy. Pt with short step-to gait pattern. Seated rest between trials.   Dyn standing:Pt able to stand at sink and reach outside [place] and across midline to place/retrieve objects while brushing teeth     Pt required increased time and rest for all activities.       Patient left up in chair with all lines intact, call button in reach, and geomat cushion    GOALS:   Multidisciplinary Problems       Physical Therapy Goals          Problem: Physical Therapy    Goal Priority Disciplines Outcome Interventions   Physical Therapy Goal     PT, PT/OT Progressing    Description: Goals to be met by: 25     Patient will increase functional independence with mobility by performin. Supine to sit with [place]  2. Sit to supine with [place]  3. Sit to stand transfer with Modified [place]  4. Gait  x 200 feet with Modified [place] using Rolling Walker.                          Time Tracking:     PT Received On: 25  PT Start Time: 1320     PT Stop Time: 1348  PT Total Time (min): 28 min     Billable Minutes: Gait Training 16 and Therapeutic Activity 12    Treatment Type: Treatment  PT/PTA: PTA     Number of PTA visits since last PT visit: 3     2025

## 2025-03-24 NOTE — PT/OT/SLP PROGRESS
Occupational Therapy   Treatment    Name: Brittany Ordonez  MRN: [id number]    Recommendations:     Recommended therapy intensity at discharge: High Intensity Therapy   Discharge Equipment Recommendations:  bath bench, hip kit  Barriers to discharge:       Assessment:     Brittany Ordonez is a 65 y.o. female with a medical diagnosis of spinal mass with cord compression. S/p laminectomy @ T6, T7, and T8. Performance deficits affecting function are weakness, impaired endurance, impaired self care skills, impaired functional mobility, gait instability, impaired balance, pain. Pt tolerated session well and progressing towards goals.     Rehab Prognosis:  Good; patient would benefit from acute skilled OT services to address these deficits and reach maximum level of function.       Plan:     Patient to be seen 6 x/week to address the above listed problems via self-care/home management, therapeutic activities, therapeutic exercises  Plan of Care Expires: 04/18/25  Plan of Care Reviewed with: patient    Subjective     Pain/Comfort:  Location - Orientation 1: generalized  Location 1: back  Pain Addressed 1: Reposition, Distraction    Objective:     Communicated with: RN prior to session.  Patient found HOB elevated with PureWick upon OT entry to room.    General Precautions: Standard, fall    Orthopedic Precautions:spinal precautions  Braces: N/A  Respiratory Status: Room air     Occupational Performance:     Bed Mobility:    Patient completed Scooting/Bridging with stand by assistance  Patient completed Supine to Sit with stand by assistance     Functional Mobility/Transfers:  Patient completed Sit <> Stand Transfer with minimum assistance  with  rolling walker   Functional Mobility: ambulated throughout room with Min A and RW. No LOB.     Activities of Daily Living:  Grooming: pt completed oral hygiene standing at sink with with CGA for standing balance during task. Unable to stand for entirety of task 2/2 fatigue.    Toileting: performed BSC t/f with Min A. SBA for hygiene after void.      Therapeutic Positioning    OT interventions performed during the course of today's session in an effort to prevent and/or reduce acquired pressure injuries:   Therapeutic positioning was provided at the conclusion of session to offload all bony prominences for the prevention and/or reduction of pressure injuries    [facility] 6 Click ADL: 18    Patient Education:  Patient provided with verbal education education regarding OT role/goals/POC, fall prevention, and safety awareness.  Understanding was verbalized.      Patient left up in chair with all lines intact, call button in reach, and geomat cushion.    GOALS:   Multidisciplinary Problems       Occupational Therapy Goals          Problem: Occupational Therapy    Goal Priority Disciplines Outcome Interventions   Occupational Therapy Goal     OT, PT/OT Progressing    Description: Goals to be met by: in 1 month      Patient will increase functional independence with ADLs by performing:    LE Dressing with Modified [place].  Grooming while standing with Modified [place].  Toileting from bedside commode with Modified [place] for hygiene and clothing management.   Toilet transfer to bedside commode with Modified [place].                         Time Tracking:     OT Date of Treatment: 03/24/25  OT Start Time: 1321  OT Stop Time: 1348  OT Total Time (min): 27 min    Billable Minutes:Self Care/Home Management 27    OT/LINDA: LINDA     Number of LINDA visits since last OT visit: 3    3/24/2025

## 2025-03-25 LAB
ALBUMIN SERPL-MCNC: 3.4 G/DL (ref 3.4–4.8)
ALBUMIN/GLOB SERPL: 1.1 RATIO (ref 1.1–2)
ALP SERPL-CCNC: 69 UNIT/L (ref 40–150)
ALT SERPL-CCNC: 10 UNIT/L (ref 0–55)
ANION GAP SERPL CALC-SCNC: 12 MEQ/L
AST SERPL-CCNC: 19 UNIT/L (ref 11–45)
BASOPHILS # BLD AUTO: 0.1 X10(3)/MCL
BASOPHILS NFR BLD AUTO: 1.1 %
BILIRUB SERPL-MCNC: 0.6 MG/DL
BUN SERPL-MCNC: 10.4 MG/DL (ref 9.8–20.1)
CALCIUM SERPL-MCNC: 9.2 MG/DL (ref 8.4–10.2)
CHLORIDE SERPL-SCNC: 103 MMOL/L (ref 98–107)
CO2 SERPL-SCNC: 28 MMOL/L (ref 23–31)
CREAT SERPL-MCNC: 0.51 MG/DL (ref 0.55–1.02)
CREAT/UREA NIT SERPL: 20
EOSINOPHIL # BLD AUTO: 0.26 X10(3)/MCL (ref 0–0.9)
EOSINOPHIL NFR BLD AUTO: 2.9 %
ERYTHROCYTE [DISTWIDTH] IN BLOOD BY AUTOMATED COUNT: 12.4 % (ref 11.5–17)
GFR SERPLBLD CREATININE-BSD FMLA CKD-EPI: >60 ML/MIN/1.73/M2
GLOBULIN SER-MCNC: 3 GM/DL (ref 2.4–3.5)
GLUCOSE SERPL-MCNC: 156 MG/DL (ref 82–115)
HCT VFR BLD AUTO: 42.1 % (ref 37–47)
HGB BLD-MCNC: 14.5 G/DL (ref 12–16)
IMM GRANULOCYTES # BLD AUTO: 0.03 X10(3)/MCL (ref 0–0.04)
IMM GRANULOCYTES NFR BLD AUTO: 0.3 %
LYMPHOCYTES # BLD AUTO: 2.96 X10(3)/MCL (ref 0.6–4.6)
LYMPHOCYTES NFR BLD AUTO: 33.4 %
MCH RBC QN AUTO: 30.7 PG (ref 27–31)
MCHC RBC AUTO-ENTMCNC: 34.4 G/DL (ref 33–36)
MCV RBC AUTO: 89.2 FL (ref 80–94)
MONOCYTES # BLD AUTO: 0.76 X10(3)/MCL (ref 0.1–1.3)
MONOCYTES NFR BLD AUTO: 8.6 %
NEUTROPHILS # BLD AUTO: 4.74 X10(3)/MCL (ref 2.1–9.2)
NEUTROPHILS NFR BLD AUTO: 53.7 %
NRBC BLD AUTO-RTO: 0 %
PLATELET # BLD AUTO: 354 X10(3)/MCL (ref 130–400)
PMV BLD AUTO: 9 FL (ref 7.4–10.4)
POCT GLUCOSE: 145 MG/DL (ref 70–110)
POCT GLUCOSE: 148 MG/DL (ref 70–110)
POCT GLUCOSE: 155 MG/DL (ref 70–110)
POCT GLUCOSE: 160 MG/DL (ref 70–110)
POCT GLUCOSE: 161 MG/DL (ref 70–110)
POTASSIUM SERPL-SCNC: 3.8 MMOL/L (ref 3.5–5.1)
PROT SERPL-MCNC: 6.4 GM/DL (ref 5.8–7.6)
RBC # BLD AUTO: 4.72 X10(6)/MCL (ref 4.2–5.4)
SODIUM SERPL-SCNC: 143 MMOL/L (ref 136–145)
WBC # BLD AUTO: 8.85 X10(3)/MCL (ref 4.5–11.5)

## 2025-03-25 PROCEDURE — 25000003 PHARM REV CODE 250: Performed by: INTERNAL MEDICINE

## 2025-03-25 PROCEDURE — 85025 COMPLETE CBC W/AUTO DIFF WBC: CPT | Performed by: INTERNAL MEDICINE

## 2025-03-25 PROCEDURE — 97535 SELF CARE MNGMENT TRAINING: CPT | Mod: CO

## 2025-03-25 PROCEDURE — 80053 COMPREHEN METABOLIC PANEL: CPT | Performed by: INTERNAL MEDICINE

## 2025-03-25 PROCEDURE — 36415 COLL VENOUS BLD VENIPUNCTURE: CPT | Performed by: INTERNAL MEDICINE

## 2025-03-25 PROCEDURE — 25000003 PHARM REV CODE 250: Performed by: NURSE PRACTITIONER

## 2025-03-25 PROCEDURE — 25000003 PHARM REV CODE 250: Performed by: HOSPITALIST

## 2025-03-25 PROCEDURE — 97110 THERAPEUTIC EXERCISES: CPT

## 2025-03-25 PROCEDURE — 97530 THERAPEUTIC ACTIVITIES: CPT

## 2025-03-25 PROCEDURE — 21400001 HC TELEMETRY ROOM

## 2025-03-25 PROCEDURE — 63600175 PHARM REV CODE 636 W HCPCS: Performed by: NURSE PRACTITIONER

## 2025-03-25 PROCEDURE — 25000003 PHARM REV CODE 250

## 2025-03-25 PROCEDURE — 99900035 HC TECH TIME PER 15 MIN (STAT)

## 2025-03-25 RX ORDER — METOPROLOL TARTRATE 1 MG/ML
5 INJECTION, SOLUTION INTRAVENOUS ONCE
Status: COMPLETED | OUTPATIENT
Start: 2025-03-25 | End: 2025-03-25

## 2025-03-25 RX ADMIN — GABAPENTIN 300 MG: 300 CAPSULE ORAL at 09:03

## 2025-03-25 RX ADMIN — GABAPENTIN 300 MG: 300 CAPSULE ORAL at 08:03

## 2025-03-25 RX ADMIN — SENNOSIDES AND DOCUSATE SODIUM 2 TABLET: 50; 8.6 TABLET ORAL at 09:03

## 2025-03-25 RX ADMIN — INSULIN ASPART 2 UNITS: 100 INJECTION, SOLUTION INTRAVENOUS; SUBCUTANEOUS at 12:03

## 2025-03-25 RX ADMIN — AMLODIPINE BESYLATE 10 MG: 5 TABLET ORAL at 09:03

## 2025-03-25 RX ADMIN — INSULIN ASPART 2 UNITS: 100 INJECTION, SOLUTION INTRAVENOUS; SUBCUTANEOUS at 04:03

## 2025-03-25 RX ADMIN — HYDROCODONE BITARTRATE AND ACETAMINOPHEN 1 TABLET: 5; 325 TABLET ORAL at 08:03

## 2025-03-25 RX ADMIN — SENNOSIDES AND DOCUSATE SODIUM 2 TABLET: 50; 8.6 TABLET ORAL at 08:03

## 2025-03-25 RX ADMIN — PAROXETINE HYDROCHLORIDE 20 MG: 20 TABLET, FILM COATED ORAL at 09:03

## 2025-03-25 RX ADMIN — GABAPENTIN 300 MG: 300 CAPSULE ORAL at 03:03

## 2025-03-25 RX ADMIN — METOPROLOL TARTRATE 5 MG: 1 INJECTION, SOLUTION INTRAVENOUS at 01:03

## 2025-03-25 RX ADMIN — BISACODYL 10 MG: 10 SUPPOSITORY RECTAL at 01:03

## 2025-03-25 RX ADMIN — APIXABAN 5 MG: 5 TABLET, FILM COATED ORAL at 08:03

## 2025-03-25 RX ADMIN — METOPROLOL SUCCINATE 12.5 MG: 25 TABLET, EXTENDED RELEASE ORAL at 09:03

## 2025-03-25 RX ADMIN — METHOCARBAMOL 750 MG: 750 TABLET ORAL at 01:03

## 2025-03-25 RX ADMIN — METOPROLOL TARTRATE 5 MG: 1 INJECTION, SOLUTION INTRAVENOUS at 04:03

## 2025-03-25 RX ADMIN — APIXABAN 5 MG: 5 TABLET, FILM COATED ORAL at 09:03

## 2025-03-25 RX ADMIN — HYDROCODONE BITARTRATE AND ACETAMINOPHEN 1 TABLET: 10; 325 TABLET ORAL at 09:03

## 2025-03-25 NOTE — PROGRESS NOTES
[facility] Hospital Medicine Progress Note        Chief Complaint: Inpatient Follow-up    HPI:     65-year-old female with significant history of colon polyp, anxiety/depression, type 2 diabetes mellitus, HTN, presented to the ED with complaints of bilateral lower extremity weakness.  Patient was recently evaluated in outlying facility and was found to have a thoracic spine mass with concern for cord compression, recommended admission but ended up leaving against medical advice.  Patient continued with weakness and difficulty voiding and therefore she decided to come back to the ED. MRI revealed mass along T6-T7 causing severe spinal canal narrowing, cervical disc herniation/protrusion, disc herniation at T7-T8, lumbar spinal canal narrowing at L4-L5, disc herniation L3-L4, L1-L2.  Patient admitted to hospital medicine services, neurosurgery services consulted.  Pelvis with no focus of malignancy.  Patient was taken to OR by Neurosurgery on 03/19 and she underwent thoracic spine laminectomy with excision of spinal cord lesion, awaiting path.  BP accelerated on 03/21 and therefore home verapamil was resumed, Coreg dosing decreased given borderline bradycardia.  Beta-blocker had to be held on 03/22 given severe bradycardia with heart rate dropping to 40s, BP poorly controlled and therefore added hydralazine.  Pathology came back-meningioma.  Given significant bradycardia verapamil also had to be discontinued.  Hydralazine was added for accelerated BP, patient became tachycardic on 03/23, hydralazine discontinued, patient was placed on Lopressor, echo non impressive, later in the evening she went into AFib, but rate was controlled, decided to consult Cardiology.    Interval Hx:   Patient seen at bedside, please accelerated today and now she is bradycardic after being on Lopressor, patient is not symptomatic, no acute events overnight     Objective/physical exam:  General: In no acute  distress, afebrile  Chest: Clear to auscultation bilaterally  Heart: S1, S2, no appreciable murmur  Abdomen: Soft, nontender, BS +  MSK: Warm, no lower extremity edema, no clubbing or cyanosis  Neurologic: Alert and oriented x4, paraplegia  VITAL SIGNS: 24 HRS MIN & MAX LAST   Temp  Min: 97.4 °F (36.3 °C)  Max: 98.4 °F (36.9 °C) 97.7 °F (36.5 °C)   BP  Min: 116/70  Max: 190/71 135/65   Pulse  Min: 49  Max: 132  60   Resp  Min: 16  Max: 19 18   SpO2  Min: 91 %  Max: 95 % 95 %       Recent Labs   Lab 03/25/25  0441   WBC 8.85   RBC 4.72   HGB 14.5   HCT 42.1   MCV 89.2   MCH 30.7   MCHC 34.4   RDW 12.4      MPV 9.0         Recent Labs   Lab 03/19/25  0554 03/20/25  0504 03/25/25  0441      < > 143   K 3.5   < > 3.8      < > 103   CO2 24   < > 28   BUN 11.6   < > 10.4   CREATININE 0.59   < > 0.51*   CALCIUM 9.1   < > 9.2   MG 1.60  --   --    ALBUMIN 3.4   < > 3.4   ALKPHOS 76   < > 69   ALT 15   < > 10   AST 14   < > 19   BILITOT 1.0   < > 0.6    < > = values in this interval not displayed.          Microbiology Results (last 7 days)       ** No results found for the last 168 hours. **             Scheduled Med:   amLODIPine  10 mg Oral Daily    apixaban  5 mg Oral BID    bisacodyL  10 mg Rectal Daily    gabapentin  300 mg Oral TID    paroxetine  20 mg Oral Daily    senna-docusate 8.6-50 mg  2 tablet Oral BID          Assessment/Plan:    New onset AFib   Tachy-corin syndrome  LVH (+peterson/Romhiltestes criteria)  Elevated X-kejsx-oxvwh out PE/DVT  Thoracic spinal mass along T6-T7 causing severe spinal canal narrowing status post laminectomy, excision of mass-path consistent with meningioma.  Paraplegia likely secondary to above   Multilevel cervical disc herniation/multilevel lumbar spinal canal stenosis with disc herniation  History of colon polyp   Mildly elevated CEA  History of anxiety/depression   Type 2 diabetes mellitus -stable  Essential HTN-stable  Prophylaxis      Heart rate is very labile    I initiated low-dose Toprol-XL today-12.5 mg daily, closely monitor heart rate  Received okay from Neurosurgery to start full-dose anticoagulation for thromboembolic prophylaxis  Patient is now on Eliquis 5 mg b.i.d.  BP stable on amlodipine  No verapamil given bradycardia  Echo is non impressive   D-dimer elevated, PE/DVT ruled out  Pathology consistent with meningioma, no malignancy  Patient had CT chest, abdomen, pelvis with no focus of malignancy   CEA slightly elevated, she has history of colon polyps, can pursue outpatient colonoscopy after recovery   CBG stable   Continue sliding scale  Continue gabapentin, paroxetine  Continue bowel regimen   DVT prophylaxis-Eliquis    I initiated a transfer request to [facility] so that she can be closer to home for rehab      Wen Romo MD   03/25/2025

## 2025-03-25 NOTE — PT/OT/SLP PROGRESS
Physical Therapy Treatment    Patient Name:  Brittany Ordonez   MRN:  [id number]    Recommendations:     Discharge therapy intensity: High Intensity Therapy   Discharge Equipment Recommendations:  (TBD by next level of care)  Barriers to discharge: None    Assessment:     Brittany Ordonez is a 65 y.o. female admitted with a medical diagnosis of spinal mass with cord compression. S/p laminectomy @ T6, T7, and T8.  She presents with the following impairments/functional limitations: weakness, impaired endurance, impaired self care skills, gait instability, impaired functional mobility, impaired balance, decreased safety awareness, pain.    Rehab Prognosis: Good; patient would benefit from acute skilled PT services to address these deficits and reach maximum level of function.    Recent Surgery: Procedure(s) (LRB):  LAMINECTOMY, SPINE, THORACIC (N/A)  LAMINECTOMY, SPINE, THORACIC, POSTERIOR APPROACH, WITH EXCISION OF SPINAL CORD LESION 6 Days Post-Op    Plan:     During this hospitalization, patient would benefit from acute PT services 6 x/week to address the identified rehab impairments via gait training, therapeutic activities, therapeutic exercises, neuromuscular re-education and progress toward the following goals:    Plan of Care Expires:  04/20/25    Subjective     Chief Complaint: pain  Patient/Family Comments/goals: none  Pain/Comfort:  Pain Rating 1: 5/10  Location 1: back  Pain Addressed 1: Distraction, Reposition  Pain Rating Post-Intervention 1: 5/10      Objective:     Communicated with nurse prior to session.  Patient found supine with PureWick upon PT entry to room.     General Precautions: Standard, fall  Orthopedic Precautions: spinal precautions  Braces: N/A  Respiratory Status: Room air  Skin Integrity: Visible skin intact      Functional Mobility:  Bed Mobility:  Supine to Sit: minimum assistance  Transfers:  Sit to Stand:  minimum assistance with rolling walker  Gait: 30 ft with RW & CGA. Cueing for  increased step length. Multiple standing rest breaks. No major LOB  Balance: fair    Therapeutic Exercises:  Patient performed seated Marches, Heel Raises, LAQ, Glute Sets, Resisted Hip ABD/ADD. All performed 2 x 20 reps.    Education Provided:  Role and goals of PT, transfer training, bed mobility, gait training, balance training, safety awareness, assistive device, strengthening exercises, and importance of participating in PT to return to PLOF.    Patient left up in chair with all lines intact, call button in reach, and educated on calling for assistance when ready to return to bed    GOALS:   Multidisciplinary Problems       Physical Therapy Goals          Problem: Physical Therapy    Goal Priority Disciplines Outcome Interventions   Physical Therapy Goal     PT, PT/OT Progressing    Description: Goals to be met by: 25     Patient will increase functional independence with mobility by performin. Supine to sit with [place]  2. Sit to supine with [place]  3. Sit to stand transfer with Modified [place]  4. Gait  x 200 feet with Modified [place] using Rolling Walker.                          Time Tracking:     PT Received On: 25  PT Start Time: 1140     PT Stop Time: 1204  PT Total Time (min): 24 min     Billable Minutes: Therapeutic Activity 12 minutes and Therapeutic Exercise 12 minutes    Treatment Type: Treatment  PT/PTA: PT     Number of PTA visits since last PT visit: 4     2025

## 2025-03-25 NOTE — PT/OT/SLP PROGRESS
Occupational Therapy   Treatment    Name: Brittany Ordonez  MRN: [id number]  Admitting Diagnosis:  <principal problem not specified>  6 Days Post-Op    Recommendations:     Recommended therapy intensity at discharge: High Intensity Therapy   Discharge Equipment Recommendations:  to be determined by next level of care  Barriers to discharge:  Other (Comment) (ongoing medical needs)    Assessment:     Brittany Ordonez is a 65 y.o. female with a medical diagnosis of spinal mass with cord compression. S/p laminectomy @ T6, T7, and T8.  She presents with the following performance deficits affecting function: weakness, impaired functional mobility, impaired endurance, gait instability, decreased safety awareness, decreased coordination, impaired balance, decreased upper extremity function, decreased lower extremity function, impaired self care skills.     Pt able to tolerate this afternoon's session fairly. She was able to t/f to bedside commode with min A via step t/f using RW. Toileting completed on BSC with mod A to reach posterior perineum.  Pt tolerated additional ambulation to bathroom sink with CGA and Rw. Standing rest breaks utilized due to fatigue. Pt returned to bed with mod A for sup to sit. Pt complained of back pain at this time; RN notified. Rec high int post dc to facilitate inc level of function.     Rehab Prognosis:  Good; patient would benefit from acute skilled OT services to address these deficits and reach maximum level of function.       Plan:     Patient to be seen 6 x/week to address the above listed problems via self-care/home management, therapeutic activities, therapeutic exercises  Plan of Care Expires: 04/18/25  Plan of Care Reviewed with: patient    Subjective     Pain/Comfort:  Pain Rating 1: other (see comments) (not rated)  Location 1: back    Objective:     Communicated with: Rn prior to session.  Patient found HOB elevated with PureWick, peripheral IV upon OT entry to room.    General  Precautions: Standard, fall    Orthopedic Precautions:spinal precautions  Braces: N/A  Respiratory Status: Room air       Occupational Performance:     Bed Mobility:    Patient completed Supine to Sit with minimum assistance  Patient completed Sit to Supine with moderate assistance     Functional Mobility/Transfers:  Patient completed Sit <> Stand Transfer with minimum assistance  with  rolling walker   Patient completed Toilet Transfer Step Transfer technique with minimum assistance with  rolling walker  Functional Mobility: Pt able to ambulate to bedside commode ~ 4 ft for step t/f with min A and RW. Also able to reach bathroom sink from bedside with standing rest breaks (CGA with RW).     Activities of Daily Living:  Toileting: max A for clothing management prior to BM on BSC; mod A provided for hygiene (pt able to reach anterior perineum, posterior completed by therapy)       Therapeutic Positioning    OT interventions performed during the course of today's session in an effort to prevent and/or reduce acquired pressure injuries:   Education was provided on benefits of and recommendations for therapeutic positioning    Skin assessment:  visible skin    Findings: no redness or breakdown noted    [facility] 6 Click ADL: 18    Patient Education:  Patient provided with verbal education education regarding OT role/goals/POC, post op precautions, fall prevention, safety awareness, Discharge/DME recommendations, and pressure ulcer prevention.  Understanding was verbalized.      Patient left HOB elevated with all lines intact, call button in reach, and Rn notified.    GOALS:   Multidisciplinary Problems       Occupational Therapy Goals          Problem: Occupational Therapy    Goal Priority Disciplines Outcome Interventions   Occupational Therapy Goal     OT, PT/OT Progressing    Description: Goals to be met by: in 1 month      Patient will increase functional independence with ADLs by performing:    LE Dressing with  Modified [place].  Grooming while standing with Modified [place].  Toileting from bedside commode with Modified [place] for hygiene and clothing management.   Toilet transfer to bedside commode with Modified [place].                         Time Tracking:     OT Date of Treatment: 03/25/25  OT Start Time: 1436  OT Stop Time: 1458  OT Total Time (min): 22 min    Billable Minutes:Self Care/Home Management 1    OT/LINDA: LINDA     Number of LINDA visits since last OT visit: 4    3/25/2025

## 2025-03-25 NOTE — PROGRESS NOTES
[facility]    Cardiology  Progress Note    Patient Name: Brittany Ordonez  MRN: [id number]  Admission Date: 3/19/2025  Hospital Length of Stay: 6 days  Code Status: Full Code   Attending Provider: Wen Romo MD   Consulting Provider: ERNESTO Forman  Primary Care Physician: Nadya Prado NP  Principal Problem:<principal problem not specified>    Patient information was obtained from patient, past medical records, and ER records.     Subjective:     Reason for Consult: afib    HPI: Ms. Ordonez is a 65 year old female who is known to [facility], Dr. Nieves & Dr. Dixon. She presents to the ER with complaints of BLE weakness. She was recently evaluated at an outside facility and was found to have a thoracic spine mass w/ concern for cord compression. She ended up leaving AMA. Due to ongoing/worsening symptoms, she presented back to the ER. Further workup revealed a mass along T6-T7 causing severe spinal canal narrowing, cervical disc herniation/protrusion, disc herniation at T7-T8, lumbar spinal canal narrowing at L4-L5, disc herniation L3-L4, L1-L2. She was taken to the OR with neurosurgery on 3.19.25 and underwent a thoracic spine laminectomy w/ excision of spinal cord lesion. Pathology came back with meningioma. She denies CP, SOB, or palps. During her stay, her home verapamil & BB were resumed, but she became bradycardic. She later became tachycardic, which is the reason [facility] has been consulted.     3.25.25: NAD. SR on tele. Having intermittent episodes of tachycardia overnight. Denies CP, SOB, or palps.     PMH: anxiety, colon polyps, depression, DM 2, HTN, SREEKANTH, VHD  PSH: , appendectomy, cholecystectomy   Family History: Father - Dm 2, hypothyroidism, HHD, CHF, PAF, CKD; Mother - DM 2, HTN, cirrhosis   Social History: Every day tobacco use. Denies alcohol or illicit drug use.     Previous Cardiac Diagnostics:   TTE (3.23.25):  Left Ventricle: The left ventricle is  normal in size. Mildly increased wall thickness. There is normal systolic function with a visually estimated ejection fraction of 55 - 60%.  Right Ventricle: The right ventricle is normal in size. Systolic function is normal. TAPSE is 2.11 cm.  Left Atrium: Moderately dilated  Right Atrium: Right atrium is moderately dilated.  Mitral Valve: There is trace regurgitation.  Tricuspid Valve: There is mild regurgitation.    BLE NIVA (9.7.23):   The study quality is average.   Patent right lower extremity deep veins with no evidence of thrombus.   The right GSV from upper thigh to lower calf is non compressible and shows no flow with color doppler consistent with a successful varithena procedure.     Carotid US (6.19.23):  The study quality is average.   40-59% stenosis in the proximal right internal carotid artery based on Bluth Criteria.   40-59% stenosis in the proximal left internal carotid artery based on Bluth Criteria. Poor visualization of distal left internal carotid artery.  Antegrade right vertebral artery flow.   Antegrade left vertebral artery flow.   Incidental finding: left thyroid nodule    TTE (6.19.23):  The study quality is average.   The left ventricle is normal in size. The left ventricular ejection fraction is 55-60%. Global left ventricular systolic function is normal. Left ventricular diastolic function is normal. Noted left ventricular hypertrophy. It is mild. Cardiac arrhythmia noted.  The left atrial diameter is moderately increased. Left atrial diameter is 4.6 cms.  Mild (1+) mitral regurgitation.  Trace tricuspid regurgitation.   The pulmonary artery systolic pressure is 22 mmHg.       Review of patient's allergies indicates:  No Known Allergies  No current facility-administered medications on file prior to encounter.     Current Outpatient Medications on File Prior to Encounter   Medication Sig    carvediloL (COREG) 6.25 MG tablet Take 6.25 mg by mouth 2 (two) times daily with meals.     gabapentin (NEURONTIN) 300 MG capsule Take 300 mg by mouth 3 (three) times daily.    metformin (GLUCOPHAGE) 1000 MG tablet Take 1,000 mg by mouth 2 (two) times daily with meals.    verapamiL (VERELAN) 120 MG C24P Take 120 mg by mouth 2 (two) times a day.    hydrOXYzine HCL (ATARAX) 25 MG tablet Take 25 mg by mouth 2 (two) times a day.    paroxetine (PAXIL) 20 MG tablet Take 20 mg by mouth every morning.    SITagliptin (JANUVIA) 100 MG Tab Take 100 mg by mouth once daily.       Review of Systems   Respiratory:  Negative for shortness of breath.    Cardiovascular:  Negative for chest pain and palpitations.   Musculoskeletal:  Positive for back pain.   All other systems reviewed and are negative.      Objective:     Vital Signs (Most Recent):  Temp: 98 °F (36.7 °C) (03/25/25 0415)  Pulse: 101 (03/25/25 0415)  Resp: 18 (03/25/25 0415)  BP: (!) 144/77 (03/25/25 0415)  SpO2: (!) 93 % (03/25/25 0415) Vital Signs (24h Range):  Temp:  [97.4 °F (36.3 °C)-98.4 °F (36.9 °C)] 98 °F (36.7 °C)  Pulse:  [] 101  Resp:  [16-19] 18  SpO2:  [91 %-94 %] 93 %  BP: (116-190)/(57-77) 144/77   Weight: 117.9 kg (259 lb 14.8 oz)  Body mass index is 36.25 kg/m².  SpO2: (!) 93 %       Intake/Output Summary (Last 24 hours) at 3/25/2025 0755  Last data filed at 3/25/2025 0541  Gross per 24 hour   Intake --   Output 1350 ml   Net -1350 ml     Lines/Drains/Airways       Peripheral Intravenous Line  Duration                  Peripheral IV - Single Lumen 03/22/25 1522 20 G Anterior;Proximal;Right Forearm 2 days                  Significant Labs:   Chemistries:   Recent Labs   Lab 03/19/25  0554 03/20/25  0504 03/21/25  0453 03/23/25  0931 03/23/25  1404 03/24/25  0428 03/25/25  0441    139 135* 136  --  138 143   K 3.5 3.8 3.8 3.7  --  3.9 3.8    106 102 98  --  98 103   CO2 24 26 25 29  --  29 28   BUN 11.6 13.1 14.0 14.8  --  14.4 10.4   CREATININE 0.59 0.62 0.59 0.55  --  0.61 0.51*   CALCIUM 9.1 8.7 8.5 9.0  --  9.3 9.2    BILITOT 1.0  --  1.0 0.8  --  0.6 0.6   ALKPHOS 76  --  58 70  --  63 69   ALT 15  --  9 10  --  9 10   AST 14  --  14 16  --  14 19   GLUCOSE 135* 123* 127* 180*  --  126* 156*   MG 1.60  --   --   --   --   --   --    TROPONINI  --   --   --   --  <0.010  --   --         CBC/Anemia Labs: Coags:    Recent Labs   Lab 03/23/25  0931 03/24/25  0428 03/25/25  0441   WBC 11.34 8.99 8.85   HGB 14.1 13.5 14.5   HCT 42.1 39.7 42.1    322 354   MCV 92.1 91.5 89.2   RDW 12.4 12.5 12.4    Recent Labs   Lab 03/19/25  0554   INR 1.1   APTT 28.5        Significant Imaging:  Imaging Results              CT Chest Abdomen Pelvis With IV Contrast (XPD) NO Oral Contrast (Final result)  Result time 03/19/25 06:20:28      Final result by Juanjose Farah MD (03/19/25 06:20:28)                   Impression:    Impression:    1. 2cm rim calcified lesion left thyroid lobe likely benign.    2. No acute intrathoracic pathology identified. No acute intraabdominal or pelvic solid organ or bowel pathology identified. No suspicious mass lesion appreciated. Details and other findings as discussed above.    No significant discrepancy with overnight report.      Electronically signed by: Juanjose Farah  Date:    03/19/2025  Time:    06:20               Narrative:      Technique: CT Scan of the chest abdomen and pelvis was performed with intravenous contrast with axial as well as sagittal and, coronal images.    Dosage Information: Automated Exposure Control was utilized.    Comparison: Comparison is with study dated June 29, 2023.    Clinical History: Cancer screening.    Findings:    Soft Tissues: Mild gynecomastia.    Neck: 2cm rim calcified lesion left thyroid lobe likely benign.    Mediastinum: The mediastinal structures are within normal limits.    Heart: Coronary artery calcification is seen.    Aorta: Mild aortic calcification is seen in the arch and    Lungs: There is mild non specific dependent change at the lung bases. There is a  1.9 x 1.1 cm pulmonary cyst in the anterior segment of the left upper lobe.    Pleura: No effusions or pneumothorax are identified.    Bony Structures:    Spine: Mild spondylotic changes are seen in the thoracic spine.    Abdomen:    Abdominal Wall: There is a small umbilical hernia which contains mesenteric fat.    Liver: The liver appears unremarkable.    Biliary System: No intrahepatic or extrahepatic biliary duct dilatation is seen.    Gallbladder: The gallbladder is not identified. Correlate with surgical history.    Pancreas: The pancreas appears unremarkable.    Spleen: The spleen appears unremarkable.    Adrenals: The adrenal glands appear unremarkable.    Kidneys: The kidneys appear unremarkable with no stones cysts masses or hydronephrosis with IV contrast decreasing sensitivity and specificity for stones.    Aorta: There is moderate calcification of the abdominal aorta and its branches.    IVC: Unremarkable.    Bowel:    Esophagus: The visualized distal esophagus appears unremarkable.    Stomach: The stomach appears unremarkable.    Duodenum: Unremarkable appearing duodenum.    Small Bowel: The small bowel appears unremarkable.    Appendix: The appendix is not identified but no inflammatory changes are seen in the right lower quadrant to suggest appendicitis.    Peritoneum: No intraperitoneal free air or ascites is seen.    Pelvis:    Bladder: The bladder appears unremarkable.    Female:    Uterus: The uterus appears unremarkable.    Ovaries: The ovaries appear unremarkable.    Bony structures:    Dorsal Spine: There is mild spondylosis of the visualized dorsal spine.    Bony Pelvis: Mild degenerative change of the bilateral hips.                        Preliminary result by Paco Kramer Jr., MD (03/19/25 01:49:42)                   Impression:    1. 2cm rim calcified lesion left thyroid lobe likely benign.  2. No acute intrathoracic pathology identified. No acute intraabdominal or pelvic solid  organ or bowel pathology identified. No suspicious mass lesion appreciated. Details and other findings as discussed above.               Narrative:    START OF REPORT:  Technique: CT Scan of the chest abdomen and pelvis was performed with intravenous contrast with axial as well as sagittal and, coronal images.    Dosage Information: Automated Exposure Control was utilized.    Comparison: Comparison is with study dated 2023-06-29 12:36:11.    Clinical History: Cancer screening.    Findings:  Soft Tissues: Mild gynecomastia.  Neck: 2cm rim calcified lesion left thyroid lobe likely benign.  Mediastinum: The mediastinal structures are within normal limits.  Heart: The heart size is within normal limits. Moderate coronary artery calcification is seen.  Aorta: Mild aortic calcification is seen in the arch and descending thoracic aorta.  Pulmonary Arteries: Unremarkable.  Lungs: There is mild non specific dependent change at the lung bases. There is a 1.9 x 1.1 cm pulmonary cyst in the anterior segment of the left upper lobe.  Pleura: No effusions or pneumothorax are identified.  Bony Structures:  Spine: Mild spondylotic changes are seen in the thoracic spine.  Abdomen:  Abdominal Wall: There is a small umbilical hernia which contains mesenteric fat.  Liver: The liver appears unremarkable.  Biliary System: No intrahepatic or extrahepatic biliary duct dilatation is seen.  Gallbladder: The gallbladder is not identified. Correlate with surgical history.  Pancreas: The pancreas appears unremarkable.  Spleen: The spleen appears unremarkable.  Adrenals: The adrenal glands appear unremarkable.  Kidneys: The kidneys appear unremarkable with no stones cysts masses or hydronephrosis with IV contrast decreasing sensitivity and specificity for stones.  Aorta: There is moderate calcification of the abdominal aorta and its branches.  IVC: Unremarkable.  Bowel:  Esophagus: The visualized distal esophagus appears unremarkable.  Stomach:  The stomach appears unremarkable.  Duodenum: Unremarkable appearing duodenum.  Small Bowel: The small bowel appears unremarkable.  Appendix: The appendix is not identified but no inflammatory changes are seen in the right lower quadrant to suggest appendicitis.  Peritoneum: No intraperitoneal free air or ascites is seen.    Pelvis:  Bladder: The bladder appears unremarkable.  Female:  Uterus: The uterus appears unremarkable.  Ovaries: The ovaries appear unremarkable.    Bony structures:  Dorsal Spine: There is mild spondylosis of the visualized dorsal spine.  Bony Pelvis: There is mild degenerative change of the bilateral hips.                                        Telemetry:  SR    Physical Exam  HENT:      Head: Normocephalic.      Nose: Nose normal.      Mouth/Throat:      Mouth: Mucous membranes are dry.   Eyes:      Extraocular Movements: Extraocular movements intact.   Cardiovascular:      Rate and Rhythm: Normal rate and regular rhythm.      Pulses: Normal pulses.      Heart sounds: Normal heart sounds.   Pulmonary:      Effort: Pulmonary effort is normal.   Abdominal:      Palpations: Abdomen is soft.   Skin:     General: Skin is warm.   Neurological:      Mental Status: She is alert and oriented to person, place, and time.   Psychiatric:         Behavior: Behavior normal.       Home Medications:   Medications Ordered Prior to Encounter[1]  Current Schedule Inpatient Medications:   amLODIPine  10 mg Oral Daily    apixaban  5 mg Oral BID    bisacodyL  10 mg Rectal Daily    gabapentin  300 mg Oral TID    paroxetine  20 mg Oral Daily    senna-docusate 8.6-50 mg  2 tablet Oral BID     Continuous Infusions:    Assessment:   PAF - now SB w/ PAC's (New Dx)    - HR Labile    - CHADsVASc - 3 Points - 3.2% Stroke Risk per Year   Thoracic Spinal Mass along T6-T7    - s/p Excision w/ Meningioma pathology   HTN  DM 2  SREEKANTH  VHD    - Mild TR (3.23.25)  Varicose Veins   Anxiety/Depression   Tobacco Use    Plan:   Recommend  starting anticoagulation when okay with neurosurgery for CVA prophylaxis in the setting of PAF.   Agree with starting ToproL XL 12.5 mg daily. Continue to monitor on tele. Hold verapamil.   Keep K > 4 & Mg > 2.   Will continue to follow.       Lashanda Smith, SHITAL  Cardiology  [facility]          [1]   No current facility-administered medications on file prior to encounter.     Current Outpatient Medications on File Prior to Encounter   Medication Sig Dispense Refill    carvediloL (COREG) 6.25 MG tablet Take 6.25 mg by mouth 2 (two) times daily with meals.      gabapentin (NEURONTIN) 300 MG capsule Take 300 mg by mouth 3 (three) times daily.      metformin (GLUCOPHAGE) 1000 MG tablet Take 1,000 mg by mouth 2 (two) times daily with meals.      verapamiL (VERELAN) 120 MG C24P Take 120 mg by mouth 2 (two) times a day.      hydrOXYzine HCL (ATARAX) 25 MG tablet Take 25 mg by mouth 2 (two) times a day.      paroxetine (PAXIL) 20 MG tablet Take 20 mg by mouth every morning.      SITagliptin (JANUVIA) 100 MG Tab Take 100 mg by mouth once daily.

## 2025-03-26 VITALS
TEMPERATURE: 98 F | RESPIRATION RATE: 18 BRPM | HEART RATE: 88 BPM | OXYGEN SATURATION: 92 % | DIASTOLIC BLOOD PRESSURE: 64 MMHG | SYSTOLIC BLOOD PRESSURE: 154 MMHG | BODY MASS INDEX: 36.39 KG/M2 | WEIGHT: 259.94 LBS | HEIGHT: 71 IN

## 2025-03-26 PROBLEM — G95.89 CERVICAL SPINAL MASS: Status: ACTIVE | Noted: 2025-03-26

## 2025-03-26 LAB
ANION GAP SERPL CALC-SCNC: 10 MEQ/L
BUN SERPL-MCNC: 13.5 MG/DL (ref 9.8–20.1)
CALCIUM SERPL-MCNC: 8.9 MG/DL (ref 8.4–10.2)
CHLORIDE SERPL-SCNC: 104 MMOL/L (ref 98–107)
CO2 SERPL-SCNC: 27 MMOL/L (ref 23–31)
CREAT SERPL-MCNC: 0.58 MG/DL (ref 0.55–1.02)
CREAT/UREA NIT SERPL: 23
GFR SERPLBLD CREATININE-BSD FMLA CKD-EPI: >60 ML/MIN/1.73/M2
GLUCOSE SERPL-MCNC: 123 MG/DL (ref 82–115)
MAGNESIUM SERPL-MCNC: 1.6 MG/DL (ref 1.6–2.6)
POCT GLUCOSE: 169 MG/DL (ref 70–110)
POTASSIUM SERPL-SCNC: 3.9 MMOL/L (ref 3.5–5.1)
SODIUM SERPL-SCNC: 141 MMOL/L (ref 136–145)

## 2025-03-26 PROCEDURE — 27000221 HC OXYGEN, UP TO 24 HOURS

## 2025-03-26 PROCEDURE — 63600175 PHARM REV CODE 636 W HCPCS: Performed by: INTERNAL MEDICINE

## 2025-03-26 PROCEDURE — 25000003 PHARM REV CODE 250

## 2025-03-26 PROCEDURE — 36415 COLL VENOUS BLD VENIPUNCTURE: CPT | Performed by: INTERNAL MEDICINE

## 2025-03-26 PROCEDURE — 63600175 PHARM REV CODE 636 W HCPCS: Performed by: NURSE PRACTITIONER

## 2025-03-26 PROCEDURE — 97530 THERAPEUTIC ACTIVITIES: CPT | Mod: CQ

## 2025-03-26 PROCEDURE — 25000003 PHARM REV CODE 250: Performed by: INTERNAL MEDICINE

## 2025-03-26 PROCEDURE — 99900035 HC TECH TIME PER 15 MIN (STAT)

## 2025-03-26 PROCEDURE — 97116 GAIT TRAINING THERAPY: CPT | Mod: CQ

## 2025-03-26 PROCEDURE — 80048 BASIC METABOLIC PNL TOTAL CA: CPT | Performed by: INTERNAL MEDICINE

## 2025-03-26 PROCEDURE — 25000003 PHARM REV CODE 250: Performed by: HOSPITALIST

## 2025-03-26 PROCEDURE — 94760 N-INVAS EAR/PLS OXIMETRY 1: CPT

## 2025-03-26 PROCEDURE — 83735 ASSAY OF MAGNESIUM: CPT | Performed by: INTERNAL MEDICINE

## 2025-03-26 RX ORDER — HYDROCODONE BITARTRATE AND ACETAMINOPHEN 10; 325 MG/1; MG/1
1 TABLET ORAL EVERY 8 HOURS PRN
Qty: 21 TABLET | Refills: 0 | Status: SHIPPED | OUTPATIENT
Start: 2025-03-26 | End: 2025-04-02

## 2025-03-26 RX ORDER — HYDROXYZINE HYDROCHLORIDE 25 MG/1
25 TABLET, FILM COATED ORAL 2 TIMES DAILY PRN
Start: 2025-03-26

## 2025-03-26 RX ORDER — AMLODIPINE BESYLATE 10 MG/1
10 TABLET ORAL DAILY
Qty: 90 TABLET | Refills: 0 | Status: SHIPPED | OUTPATIENT
Start: 2025-03-27 | End: 2025-06-25

## 2025-03-26 RX ORDER — METHOCARBAMOL 750 MG/1
750 TABLET, FILM COATED ORAL 3 TIMES DAILY PRN
Qty: 30 TABLET | Refills: 0 | Status: SHIPPED | OUTPATIENT
Start: 2025-03-26 | End: 2025-04-05

## 2025-03-26 RX ORDER — METOPROLOL SUCCINATE 25 MG/1
12.5 TABLET, EXTENDED RELEASE ORAL DAILY
Qty: 45 TABLET | Refills: 0 | Status: SHIPPED | OUTPATIENT
Start: 2025-03-27 | End: 2025-06-25

## 2025-03-26 RX ORDER — MAGNESIUM SULFATE HEPTAHYDRATE 40 MG/ML
2 INJECTION, SOLUTION INTRAVENOUS ONCE
Status: COMPLETED | OUTPATIENT
Start: 2025-03-26 | End: 2025-03-26

## 2025-03-26 RX ADMIN — INSULIN ASPART 2 UNITS: 100 INJECTION, SOLUTION INTRAVENOUS; SUBCUTANEOUS at 11:03

## 2025-03-26 RX ADMIN — AMLODIPINE BESYLATE 10 MG: 5 TABLET ORAL at 08:03

## 2025-03-26 RX ADMIN — SENNOSIDES AND DOCUSATE SODIUM 2 TABLET: 50; 8.6 TABLET ORAL at 08:03

## 2025-03-26 RX ADMIN — PAROXETINE HYDROCHLORIDE 20 MG: 20 TABLET, FILM COATED ORAL at 08:03

## 2025-03-26 RX ADMIN — APIXABAN 5 MG: 5 TABLET, FILM COATED ORAL at 08:03

## 2025-03-26 RX ADMIN — METOPROLOL SUCCINATE 12.5 MG: 25 TABLET, EXTENDED RELEASE ORAL at 08:03

## 2025-03-26 RX ADMIN — MAGNESIUM SULFATE HEPTAHYDRATE 2 G: 40 INJECTION, SOLUTION INTRAVENOUS at 08:03

## 2025-03-26 RX ADMIN — GABAPENTIN 300 MG: 300 CAPSULE ORAL at 08:03

## 2025-03-26 RX ADMIN — HYDROCODONE BITARTRATE AND ACETAMINOPHEN 1 TABLET: 5; 325 TABLET ORAL at 08:03

## 2025-03-26 NOTE — NURSING
Pt and  received discharge teaching, rx, rx info and follow up info. They verbalized understanding. Pt stable and ready for discharge to private vehicle.

## 2025-03-26 NOTE — DISCHARGE SUMMARY
[facility] Hospital Medicine Discharge Summary    Admit Date: 3/19/2025  Discharge Date and Time: 3/26/64754:06 PM  Admitting Physician:  Team  Discharging Physician: Wen Romo MD.  Primary Care Physician: Nadya Prado NP  Consults: {consultation: 03920}    Discharge Diagnoses:  ***    Hospital Course:   ***  Pt was seen and examined on the day of discharge  Vitals:  VITAL SIGNS: 24 HRS MIN & MAX LAST   Temp  Min: 98 °F (36.7 °C)  Max: 98.4 °F (36.9 °C) 98 °F (36.7 °C)   BP  Min: 113/62  Max: 160/78 (!) 154/64   Pulse  Min: 54  Max: 88  88   Resp  Min: 17  Max: 19 18   SpO2  Min: 92 %  Max: 96 % (!) 92 %       Physical Exam:  ***    Procedures Performed: No admission procedures for hospital encounter.     Significant Diagnostic Studies: See Full reports for all details    Recent Labs   Lab 03/23/25  0931 03/24/25  0428 03/25/25  0441   WBC 11.34 8.99 8.85   RBC 4.57 4.34 4.72   HGB 14.1 13.5 14.5   HCT 42.1 39.7 42.1   MCV 92.1 91.5 89.2   MCH 30.9 31.1* 30.7   MCHC 33.5 34.0 34.4   RDW 12.4 12.5 12.4    322 354   MPV 9.2 9.4 9.0       Recent Labs   Lab 03/23/25  0931 03/24/25  0428 03/25/25  0441 03/26/25  0358    138 143 141   K 3.7 3.9 3.8 3.9   CL 98 98 103 104   CO2 29 29 28 27   BUN 14.8 14.4 10.4 13.5   CREATININE 0.55 0.61 0.51* 0.58   CALCIUM 9.0 9.3 9.2 8.9   MG  --   --   --  1.60   ALBUMIN 3.3* 3.2* 3.4  --    ALKPHOS 70 63 69  --    ALT 10 9 10  --    AST 16 14 19  --    BILITOT 0.8 0.6 0.6  --         Microbiology Results (last 7 days)       ** No results found for the last 168 hours. **             CV Ultrasound doppler venous legs bilat  Negative for deep and superficial vein thrombosis in bilateral lower   extremities.   CTA Chest Non-Coronary (PE Studies)  Narrative: EXAMINATION:  CTA CHEST NON CORONARY (PE STUDIES)    CLINICAL HISTORY:  Pulmonary embolism (PE) suspected, positive D-dimer;    TECHNIQUE:  Low dose axial images, sagittal and  coronal reformations were obtained from the thoracic inlet to the lung bases following the IV administration of 100 mL of Omnipaque 350.  Contrast timing was optimized to evaluate the pulmonary arteries.  MIP images were performed.    Automatic exposure control (AEC) was utilized for dose reduction.    Dose: 231 mGycm    COMPARISON:  03/19/2025    FINDINGS:  The left thyroid partially calcified lesion is again seen.  Mediastinum reveals no significant adenopathy.  Thoracic aorta appears intact.  There is mild bibasilar atelectasis.  There is ground-glass density in the upper lobes bilaterally.  There are no filling defects seen within the pulmonary arteries to indicate embolus.  Impression: No pulmonary embolus is demonstrated.    Electronically signed by: Vishnu Laguerre MD  Date:    03/23/2025  Time:    20:46  Echo    Left Ventricle: The left ventricle is normal in size. Mildly increased   wall thickness. There is normal systolic function with a visually   estimated ejection fraction of 55 - 60%.    Right Ventricle: The right ventricle is normal in size. Systolic   function is normal. TAPSE is 2.11 cm.    Left Atrium: Moderately dilated    Right Atrium: Right atrium is moderately dilated.    Mitral Valve: There is trace regurgitation.    Tricuspid Valve: There is mild regurgitation.         Medication List        START taking these medications      amLODIPine 10 MG tablet  Commonly known as: NORVASC  Take 1 tablet (10 mg total) by mouth once daily. Please hold if BP less than 100/60  Start taking on: March 27, 2025     apixaban 5 mg Tab  Commonly known as: ELIQUIS  Take 1 tablet (5 mg total) by mouth 2 (two) times daily.     HYDROcodone-acetaminophen  mg per tablet  Commonly known as: NORCO  Take 1 tablet by mouth every 8 (eight) hours as needed for Pain.     methocarbamoL 750 MG Tab  Commonly known as: ROBAXIN  Take 1 tablet (750 mg total) by mouth 3 (three) times daily as needed (Muscle spasm).      metoprolol succinate 25 MG 24 hr tablet  Commonly known as: TOPROL-XL  Take 0.5 tablets (12.5 mg total) by mouth once daily. Hold if heart rate less than 60 and or BP less than 100/60  Start taking on: March 27, 2025            CHANGE how you take these medications      hydrOXYzine HCL 25 MG tablet  Commonly known as: ATARAX  Take 1 tablet (25 mg total) by mouth 2 (two) times daily as needed for Anxiety.  What changed:   when to take this  reasons to take this            CONTINUE taking these medications      gabapentin 300 MG capsule  Commonly known as: NEURONTIN     metFORMIN 1000 MG tablet  Commonly known as: GLUCOPHAGE     paroxetine 20 MG tablet  Commonly known as: PAXIL     SITagliptin phosphate 100 MG Tab  Commonly known as: JANUVIA            STOP taking these medications      COREG 6.25 MG tablet  Generic drug: carvediloL     verapamiL 120 MG C24p  Commonly known as: VERELAN               Where to Get Your Medications        These medications were sent to [facility] - [address]  [address], [facility] [address]      Phone: [phone]   amLODIPine 10 MG tablet  apixaban 5 mg Tab  HYDROcodone-acetaminophen  mg per tablet  methocarbamoL 750 MG Tab  metoprolol succinate 25 MG 24 hr tablet       Information about where to get these medications is not yet available    Ask your nurse or doctor about these medications  hydrOXYzine HCL 25 MG tablet          Explained in detail to the patient about the discharge plan, medications, and follow-up visits. Pt understands and agrees with the treatment plan  Discharge Disposition: Left Against Medical Advice   Discharged Condition: stable  Diet-   Dietary Orders (From admission, onward)       Start     Ordered    03/22/25 2225  Diet Consistent Carbohydrate 2000 Calories (up to 75 gm per meal)  Diet effective now        Question:  Total calories / carbs:  Answer:  2000 Calories  (up to 75 gm per meal)    03/22/25 2224                   Medications Per DC med rec  Activities as tolerated   Follow-up Information       Serge Atkins MD Follow up on 5/6/2025.    Specialty: Neurosurgery  Why: at 11am  Contact information:  [address] [place] [id number] [phone]               [facility] Cardiology in 1 week. Go on 4/22/2025.    Why: at 3:30pm             Radha, Shana M., NP. Go on 3/31/2025.    Specialty: Cardiology  Why: Appointment at 3pm. Inquire with the NP for a referral to Cadiology at [facility].  Contact information:  [address] [place] [address]  [phone]                           For further questions contact hospitalist office    Discharge time 33 minutes    For worsening symptoms, chest pain, shortness of breath, increased abdominal pain, high grade fever, stroke or stroke like symptoms, immediately go to the nearest Emergency Room or call 911 as soon as possible.      Wen Helms M.D, on 3/26/2025. at 1:06 PM.

## 2025-03-26 NOTE — PROGRESS NOTES
"  [facility]    Cardiology  Progress Note    Patient Name: Brittany Ordonez  MRN: [id number]  Admission Date: 3/19/2025  Hospital Length of Stay: 7 days  Code Status: Full Code   Attending Provider: Wen Romo MD   Consulting Provider: ERNESTO Varela  Primary Care Physician: Nadya Prado NP  Principal Problem:Cervical spinal mass    Patient information was obtained from patient, past medical records, and ER records.     Subjective:     Reason for Consult: afib    HPI: Ms. Ordonez is a 65 year old female who is known to [facility], Dr. Nieves & Dr. Dixon. She presents to the ER with complaints of BLE weakness. She was recently evaluated at an outside facility and was found to have a thoracic spine mass w/ concern for cord compression. She ended up leaving AMA. Due to ongoing/worsening symptoms, she presented back to the ER. Further workup revealed a mass along T6-T7 causing severe spinal canal narrowing, cervical disc herniation/protrusion, disc herniation at T7-T8, lumbar spinal canal narrowing at L4-L5, disc herniation L3-L4, L1-L2. She was taken to the OR with neurosurgery on 3.19.25 and underwent a thoracic spine laminectomy w/ excision of spinal cord lesion. Pathology came back with meningioma. She denies CP, SOB, or palps. During her stay, her home verapamil & BB were resumed, but she became bradycardic. She later became tachycardic, which is the reason [facility] has been consulted.     3.25.25: NAD. SR on tele. Having intermittent episodes of tachycardia overnight. Denies CP, SOB, or palps.   3.26.25: NAD. VSS. No complaints of CP/SOB/Palps. "I feel okay" SR with PACS on telemetry. Mag 1.6    PMH: anxiety, colon polyps, depression, DM 2, HTN, SREEKANTH, VHD  PSH: , appendectomy, cholecystectomy   Family History: Father - Dm 2, hypothyroidism, HHD, CHF, PAF, CKD; Mother - DM 2, HTN, cirrhosis   Social History: Every day tobacco use. Denies alcohol or illicit drug use. "     Previous Cardiac Diagnostics:   TTE (3.23.25):  Left Ventricle: The left ventricle is normal in size. Mildly increased wall thickness. There is normal systolic function with a visually estimated ejection fraction of 55 - 60%.  Right Ventricle: The right ventricle is normal in size. Systolic function is normal. TAPSE is 2.11 cm.  Left Atrium: Moderately dilated  Right Atrium: Right atrium is moderately dilated.  Mitral Valve: There is trace regurgitation.  Tricuspid Valve: There is mild regurgitation.    BLE NIVA (9.7.23):   The study quality is average.   Patent right lower extremity deep veins with no evidence of thrombus.   The right GSV from upper thigh to lower calf is non compressible and shows no flow with color doppler consistent with a successful varithena procedure.     Carotid US (6.19.23):  The study quality is average.   40-59% stenosis in the proximal right internal carotid artery based on Bluth Criteria.   40-59% stenosis in the proximal left internal carotid artery based on Bluth Criteria. Poor visualization of distal left internal carotid artery.  Antegrade right vertebral artery flow.   Antegrade left vertebral artery flow.   Incidental finding: left thyroid nodule    TTE (6.19.23):  The study quality is average.   The left ventricle is normal in size. The left ventricular ejection fraction is 55-60%. Global left ventricular systolic function is normal. Left ventricular diastolic function is normal. Noted left ventricular hypertrophy. It is mild. Cardiac arrhythmia noted.  The left atrial diameter is moderately increased. Left atrial diameter is 4.6 cms.  Mild (1+) mitral regurgitation.  Trace tricuspid regurgitation.   The pulmonary artery systolic pressure is 22 mmHg.       Review of patient's allergies indicates:  No Known Allergies  No current facility-administered medications on file prior to encounter.     Current Outpatient Medications on File Prior to Encounter   Medication Sig     carvediloL (COREG) 6.25 MG tablet Take 6.25 mg by mouth 2 (two) times daily with meals.    gabapentin (NEURONTIN) 300 MG capsule Take 300 mg by mouth 3 (three) times daily.    metformin (GLUCOPHAGE) 1000 MG tablet Take 1,000 mg by mouth 2 (two) times daily with meals.    verapamiL (VERELAN) 120 MG C24P Take 120 mg by mouth 2 (two) times a day.    hydrOXYzine HCL (ATARAX) 25 MG tablet Take 25 mg by mouth 2 (two) times a day.    paroxetine (PAXIL) 20 MG tablet Take 20 mg by mouth every morning.    SITagliptin (JANUVIA) 100 MG Tab Take 100 mg by mouth once daily.       Review of Systems   Respiratory:  Negative for shortness of breath.    Cardiovascular:  Negative for chest pain and palpitations.   Musculoskeletal:  Positive for back pain.   All other systems reviewed and are negative.      Objective:     Vital Signs (Most Recent):  Temp: 98.3 °F (36.8 °C) (03/26/25 0739)  Pulse: 62 (03/26/25 0832)  Resp: 18 (03/26/25 0832)  BP: (!) 146/64 (03/26/25 0832)  SpO2: (!) 94 % (03/26/25 0800) Vital Signs (24h Range):  Temp:  [97.7 °F (36.5 °C)-98.4 °F (36.9 °C)] 98.3 °F (36.8 °C)  Pulse:  [54-63] 62  Resp:  [17-19] 18  SpO2:  [92 %-96 %] 94 %  BP: (113-160)/(62-79) 146/64   Weight: 117.9 kg (259 lb 14.8 oz)  Body mass index is 36.25 kg/m².  SpO2: (!) 94 %       Intake/Output Summary (Last 24 hours) at 3/26/2025 0902  Last data filed at 3/26/2025 0300  Gross per 24 hour   Intake 240 ml   Output 925 ml   Net -685 ml     Lines/Drains/Airways       Peripheral Intravenous Line  Duration                  Peripheral IV - Single Lumen 03/22/25 1522 20 G Anterior;Proximal;Right Forearm 3 days                  Significant Labs:   Chemistries:   Recent Labs   Lab 03/21/25  0453 03/23/25  0931 03/23/25  1404 03/24/25  0428 03/25/25  0441 03/26/25  0358   * 136  --  138 143 141   K 3.8 3.7  --  3.9 3.8 3.9    98  --  98 103 104   CO2 25 29  --  29 28 27   BUN 14.0 14.8  --  14.4 10.4 13.5   CREATININE 0.59 0.55  --  0.61  "0.51* 0.58   CALCIUM 8.5 9.0  --  9.3 9.2 8.9   BILITOT 1.0 0.8  --  0.6 0.6  --    ALKPHOS 58 70  --  63 69  --    ALT 9 10  --  9 10  --    AST 14 16  --  14 19  --    GLUCOSE 127* 180*  --  126* 156* 123*   MG  --   --   --   --   --  1.60   TROPONINI  --   --  <0.010  --   --   --         CBC/Anemia Labs: Coags:    Recent Labs   Lab 03/23/25  0931 03/24/25  0428 03/25/25  0441   WBC 11.34 8.99 8.85   HGB 14.1 13.5 14.5   HCT 42.1 39.7 42.1    322 354   MCV 92.1 91.5 89.2   RDW 12.4 12.5 12.4    No results for input(s): "PT", "INR", "APTT" in the last 168 hours.       Significant Imaging:  Imaging Results              CT Chest Abdomen Pelvis With IV Contrast (XPD) NO Oral Contrast (Final result)  Result time 03/19/25 06:20:28      Final result by Juanjose Farah MD (03/19/25 06:20:28)                   Impression:    Impression:    1. 2cm rim calcified lesion left thyroid lobe likely benign.    2. No acute intrathoracic pathology identified. No acute intraabdominal or pelvic solid organ or bowel pathology identified. No suspicious mass lesion appreciated. Details and other findings as discussed above.    No significant discrepancy with overnight report.      Electronically signed by: Juanjose Farah  Date:    03/19/2025  Time:    06:20               Narrative:      Technique: CT Scan of the chest abdomen and pelvis was performed with intravenous contrast with axial as well as sagittal and, coronal images.    Dosage Information: Automated Exposure Control was utilized.    Comparison: Comparison is with study dated June 29, 2023.    Clinical History: Cancer screening.    Findings:    Soft Tissues: Mild gynecomastia.    Neck: 2cm rim calcified lesion left thyroid lobe likely benign.    Mediastinum: The mediastinal structures are within normal limits.    Heart: Coronary artery calcification is seen.    Aorta: Mild aortic calcification is seen in the arch and    Lungs: There is mild non specific dependent change " at the lung bases. There is a 1.9 x 1.1 cm pulmonary cyst in the anterior segment of the left upper lobe.    Pleura: No effusions or pneumothorax are identified.    Bony Structures:    Spine: Mild spondylotic changes are seen in the thoracic spine.    Abdomen:    Abdominal Wall: There is a small umbilical hernia which contains mesenteric fat.    Liver: The liver appears unremarkable.    Biliary System: No intrahepatic or extrahepatic biliary duct dilatation is seen.    Gallbladder: The gallbladder is not identified. Correlate with surgical history.    Pancreas: The pancreas appears unremarkable.    Spleen: The spleen appears unremarkable.    Adrenals: The adrenal glands appear unremarkable.    Kidneys: The kidneys appear unremarkable with no stones cysts masses or hydronephrosis with IV contrast decreasing sensitivity and specificity for stones.    Aorta: There is moderate calcification of the abdominal aorta and its branches.    IVC: Unremarkable.    Bowel:    Esophagus: The visualized distal esophagus appears unremarkable.    Stomach: The stomach appears unremarkable.    Duodenum: Unremarkable appearing duodenum.    Small Bowel: The small bowel appears unremarkable.    Appendix: The appendix is not identified but no inflammatory changes are seen in the right lower quadrant to suggest appendicitis.    Peritoneum: No intraperitoneal free air or ascites is seen.    Pelvis:    Bladder: The bladder appears unremarkable.    Female:    Uterus: The uterus appears unremarkable.    Ovaries: The ovaries appear unremarkable.    Bony structures:    Dorsal Spine: There is mild spondylosis of the visualized dorsal spine.    Bony Pelvis: Mild degenerative change of the bilateral hips.                        Preliminary result by Paco Kramer Jr., MD (03/19/25 01:49:42)                   Impression:    1. 2cm rim calcified lesion left thyroid lobe likely benign.  2. No acute intrathoracic pathology identified. No acute  intraabdominal or pelvic solid organ or bowel pathology identified. No suspicious mass lesion appreciated. Details and other findings as discussed above.               Narrative:    START OF REPORT:  Technique: CT Scan of the chest abdomen and pelvis was performed with intravenous contrast with axial as well as sagittal and, coronal images.    Dosage Information: Automated Exposure Control was utilized.    Comparison: Comparison is with study dated 2023-06-29 12:36:11.    Clinical History: Cancer screening.    Findings:  Soft Tissues: Mild gynecomastia.  Neck: 2cm rim calcified lesion left thyroid lobe likely benign.  Mediastinum: The mediastinal structures are within normal limits.  Heart: The heart size is within normal limits. Moderate coronary artery calcification is seen.  Aorta: Mild aortic calcification is seen in the arch and descending thoracic aorta.  Pulmonary Arteries: Unremarkable.  Lungs: There is mild non specific dependent change at the lung bases. There is a 1.9 x 1.1 cm pulmonary cyst in the anterior segment of the left upper lobe.  Pleura: No effusions or pneumothorax are identified.  Bony Structures:  Spine: Mild spondylotic changes are seen in the thoracic spine.  Abdomen:  Abdominal Wall: There is a small umbilical hernia which contains mesenteric fat.  Liver: The liver appears unremarkable.  Biliary System: No intrahepatic or extrahepatic biliary duct dilatation is seen.  Gallbladder: The gallbladder is not identified. Correlate with surgical history.  Pancreas: The pancreas appears unremarkable.  Spleen: The spleen appears unremarkable.  Adrenals: The adrenal glands appear unremarkable.  Kidneys: The kidneys appear unremarkable with no stones cysts masses or hydronephrosis with IV contrast decreasing sensitivity and specificity for stones.  Aorta: There is moderate calcification of the abdominal aorta and its branches.  IVC: Unremarkable.  Bowel:  Esophagus: The visualized distal esophagus  appears unremarkable.  Stomach: The stomach appears unremarkable.  Duodenum: Unremarkable appearing duodenum.  Small Bowel: The small bowel appears unremarkable.  Appendix: The appendix is not identified but no inflammatory changes are seen in the right lower quadrant to suggest appendicitis.  Peritoneum: No intraperitoneal free air or ascites is seen.    Pelvis:  Bladder: The bladder appears unremarkable.  Female:  Uterus: The uterus appears unremarkable.  Ovaries: The ovaries appear unremarkable.    Bony structures:  Dorsal Spine: There is mild spondylosis of the visualized dorsal spine.  Bony Pelvis: There is mild degenerative change of the bilateral hips.                                        Telemetry:      Physical Exam  HENT:      Head: Normocephalic.      Nose: Nose normal.      Mouth/Throat:      Mouth: Mucous membranes are dry.   Eyes:      Extraocular Movements: Extraocular movements intact.   Cardiovascular:      Rate and Rhythm: Normal rate and regular rhythm.      Pulses: Normal pulses.      Heart sounds: Normal heart sounds.   Pulmonary:      Effort: Pulmonary effort is normal.   Abdominal:      Palpations: Abdomen is soft.   Skin:     General: Skin is warm.   Neurological:      Mental Status: She is alert and oriented to person, place, and time.   Psychiatric:         Behavior: Behavior normal.       Home Medications:   Medications Ordered Prior to Encounter[1]  Current Schedule Inpatient Medications:   amLODIPine  10 mg Oral Daily    apixaban  5 mg Oral BID    bisacodyL  10 mg Rectal Daily    gabapentin  300 mg Oral TID    magnesium sulfate 2 g IVPB  2 g Intravenous Once    metoprolol succinate  12.5 mg Oral Daily    paroxetine  20 mg Oral Daily    senna-docusate 8.6-50 mg  2 tablet Oral BID     Continuous Infusions:    Assessment:   PAF - now SB w/ PAC's (New Dx)    - HR Labile    - CHADsVASc - 3 Points - 3.2% Stroke Risk per Year   Thoracic Spinal Mass along T6-T7    - s/p Excision w/  Meningioma pathology   HTN  DM 2  SREEKANTH  VHD    - Mild TR (3.23.25)  Varicose Veins   Anxiety/Depression   Tobacco Use    Plan:   Continue Metoprolol 12.5 mg XL oral Daily   Continue Eliquis for Stroke Risk Reduction started on 3.25.25 by primary team   Keep K > 4 & Mg > 2.   Follow-up with Dr. Nieves in 1-2 weeks     We will sign off at this time.      ERNESTO Varela  Cardiology  [facility]          [1]   No current facility-administered medications on file prior to encounter.     Current Outpatient Medications on File Prior to Encounter   Medication Sig Dispense Refill    carvediloL (COREG) 6.25 MG tablet Take 6.25 mg by mouth 2 (two) times daily with meals.      gabapentin (NEURONTIN) 300 MG capsule Take 300 mg by mouth 3 (three) times daily.      metformin (GLUCOPHAGE) 1000 MG tablet Take 1,000 mg by mouth 2 (two) times daily with meals.      verapamiL (VERELAN) 120 MG C24P Take 120 mg by mouth 2 (two) times a day.      hydrOXYzine HCL (ATARAX) 25 MG tablet Take 25 mg by mouth 2 (two) times a day.      paroxetine (PAXIL) 20 MG tablet Take 20 mg by mouth every morning.      SITagliptin (JANUVIA) 100 MG Tab Take 100 mg by mouth once daily.

## 2025-03-26 NOTE — PLAN OF CARE
03/26/25 1026   Final Note   Assessment Type Final Discharge Note   Anticipated Discharge Disposition Home   Post-Acute Status   Discharge Delays None known at this time     Pt to d/c home with SO. Eliquis coupon given to pt at bedside. Pt's PCP info obtained. [place] contacted PCP and notified them they will need to make referral for OP cardiology appt. Pt requested they make the referral. No further CM needs known at this time.     Vi Lacy LCSW

## 2025-03-26 NOTE — PLAN OF CARE
Problem: Adult Inpatient Plan of Care  Goal: Plan of Care Review  3/26/2025 0118 by Shana Redd RN  Outcome: Progressing  Flowsheets (Taken 3/26/2025 0118)  Plan of Care Reviewed With: patient  3/26/2025 0108 by Shana Redd RN  Outcome: Progressing  Flowsheets (Taken 3/26/2025 0108)  Plan of Care Reviewed With: patient  Goal: Optimal Comfort and Wellbeing  3/26/2025 0118 by Shana Redd RN  Outcome: Progressing  3/26/2025 0108 by Shana Redd RN  Outcome: Progressing  Intervention: Monitor Pain and Promote Comfort  3/26/2025 0118 by Shana Redd RN  Flowsheets (Taken 3/26/2025 0118)  Pain Management Interventions:   around-the-clock dosing utilized   medication offered   pain management plan reviewed with patient/caregiver   relaxation techniques promoted   quiet environment facilitated  3/26/2025 0108 by Shana Redd RN  Flowsheets (Taken 3/26/2025 0108)  Pain Management Interventions:   around-the-clock dosing utilized   medication offered   diversional activity provided   pain management plan reviewed with patient/caregiver   quiet environment facilitated   relaxation techniques promoted  Intervention: Provide Person-Centered Care  Flowsheets (Taken 3/26/2025 0118)  Trust Relationship/Rapport:   thoughts/feelings acknowledged   questions encouraged   emotional support provided   questions answered     Problem: Skin Injury Risk Increased  Goal: Skin Health and Integrity  Outcome: Progressing  Intervention: Optimize Skin Protection  3/26/2025 0118 by Sahna Redd RN  Flowsheets (Taken 3/26/2025 0118)  Pressure Reduction Techniques:   frequent weight shift encouraged   weight shift assistance provided   heels elevated off bed  Pressure Reduction Devices:   foam padding utilized   heel offloading device utilized  Skin Protection: incontinence pads utilized  Activity Management:   Rolling - L1   Up to bedside commode - L3   Walk with assistive devise and /or staff member -  L3  Head of Bed (HOB) Positioning: HOB at 30-45 degrees  3/26/2025 0108 by Shana Redd RN  Flowsheets (Taken 3/26/2025 0108)  Pressure Reduction Techniques:   frequent weight shift encouraged   weight shift assistance provided  Pressure Reduction Devices: positioning supports utilized  Skin Protection: incontinence pads utilized  Activity Management:   Rolling - L1   Walk with assistive devise and /or staff member - L3  Head of Bed (HOB) Positioning: HOB at 30-45 degrees     Problem: Infection  Goal: Absence of Infection Signs and Symptoms  Outcome: Progressing  Intervention: Prevent or Manage Infection  Flowsheets (Taken 3/26/2025 0118)  Fever Reduction/Comfort Measures: fluid intake increased  Infection Management: aseptic technique maintained  Isolation Precautions: precautions maintained     Problem: Wound  Goal: Optimal Coping  Outcome: Progressing  Intervention: Support Patient and Family Response  Flowsheets (Taken 3/26/2025 0118)  Supportive Measures:   active listening utilized   relaxation techniques promoted  Family/Support System Care:   support provided   involvement promoted  Goal: Optimal Functional Ability  Outcome: Progressing  Intervention: Optimize Functional Ability  Flowsheets (Taken 3/26/2025 0118)  Activity Management:   Rolling - L1   Up to bedside commode - L3   Walk with assistive devise and /or staff member - L3  Goal: Absence of Infection Signs and Symptoms  3/26/2025 0118 by Shana Redd RN  Outcome: Progressing  3/26/2025 0108 by Shana Redd RN  Outcome: Progressing  Intervention: Prevent or Manage Infection  3/26/2025 0118 by Shana Redd RN  Flowsheets (Taken 3/26/2025 0118)  Fever Reduction/Comfort Measures: fluid intake increased  Infection Management: aseptic technique maintained  Isolation Precautions: precautions maintained  3/26/2025 0108 by Shana Redd RN  Flowsheets (Taken 3/26/2025 0108)  Infection Management: aseptic technique maintained  Goal:  Optimal Pain Control and Function  Outcome: Progressing  Intervention: Prevent or Manage Pain  3/26/2025 0118 by Shana Redd RN  Flowsheets (Taken 3/26/2025 0118)  Sleep/Rest Enhancement:   noise level reduced   awakenings minimized   relaxation techniques promoted  Pain Management Interventions:   around-the-clock dosing utilized   medication offered   pain management plan reviewed with patient/caregiver   relaxation techniques promoted   quiet environment facilitated  3/26/2025 0108 by Shana Redd RN Flowsheets (Taken 3/26/2025 0108)  Sleep/Rest Enhancement:   relaxation techniques promoted   noise level reduced  Pain Management Interventions:   around-the-clock dosing utilized   medication offered   diversional activity provided   pain management plan reviewed with patient/caregiver   quiet environment facilitated   relaxation techniques promoted  Goal: Skin Health and Integrity  Outcome: Progressing  Intervention: Optimize Skin Protection  3/26/2025 0118 by Shana Redd RN Flowsheets (Taken 3/26/2025 0118)  Pressure Reduction Techniques:   frequent weight shift encouraged   weight shift assistance provided   heels elevated off bed  Pressure Reduction Devices:   foam padding utilized   heel offloading device utilized  Skin Protection: incontinence pads utilized  Activity Management:   Rolling - L1   Up to bedside commode - L3   Walk with assistive devise and /or staff member - L3  Head of Bed (HOB) Positioning: HOB at 30-45 degrees  3/26/2025 0108 by Shana Redd RN Flowsheets (Taken 3/26/2025 0108)  Pressure Reduction Techniques:   frequent weight shift encouraged   weight shift assistance provided  Pressure Reduction Devices: positioning supports utilized  Skin Protection: incontinence pads utilized  Activity Management:   Rolling - L1   Walk with assistive devise and /or staff member - L3  Head of Bed (HOB) Positioning: HOB at 30-45 degrees  Goal: Optimal Wound Healing  3/26/2025 0118  by Shana Redd RN  Outcome: Progressing  3/26/2025 0108 by Shana Redd RN  Outcome: Progressing  Intervention: Promote Wound Healing  3/26/2025 0118 by Shana Redd RN  Flowsheets (Taken 3/26/2025 0118)  Sleep/Rest Enhancement:   noise level reduced   awakenings minimized   relaxation techniques promoted  3/26/2025 0108 by Shana Redd RN  Flowsheets (Taken 3/26/2025 0108)  Sleep/Rest Enhancement:   relaxation techniques promoted   noise level reduced

## 2025-03-26 NOTE — PT/OT/SLP PROGRESS
Physical Therapy Treatment    Patient Name:  Brittany Ordonez   MRN:  [id number]    Recommendations:     Discharge therapy intensity: Low Intensity Therapy   Discharge Equipment Recommendations:  (TBD by next level of care)  Barriers to discharge: None    Assessment:     Brittany Ordonez is a 65 y.o. female admitted with a medical diagnosis of spinal mass with cord compression. S/p laminectomy @ T6, T7, and T8.  She presents with the following impairments/functional limitations: weakness, impaired endurance, impaired self care skills, gait instability, impaired functional mobility, impaired balance, decreased safety awareness, pain.   Rehab Prognosis: Good; patient would benefit from acute skilled PT services to address these deficits and reach maximum level of function.    Recent Surgery: Procedure(s) (LRB):  LAMINECTOMY, SPINE, THORACIC (N/A)  LAMINECTOMY, SPINE, THORACIC, POSTERIOR APPROACH, WITH EXCISION OF SPINAL CORD LESION 7 Days Post-Op    Plan:     During this hospitalization, patient would benefit from acute PT services 6 x/week to address the identified rehab impairments via gait training, therapeutic activities, therapeutic exercises, neuromuscular re-education and progress toward the following goals:    Plan of Care Expires:  04/20/25    Subjective     Chief Complaint: I need the bathroom    Objective:     Communicated with nurse prior to session.  Patient found supine with Pure[employer]ick upon PT entry to room.     General Precautions: Standard, fall  Orthopedic Precautions: spinal precautions  Braces: N/A  Respiratory Status: Room air  Blood Pressure:   Skin Integrity:  coverlet dressing to t-spine      Functional Mobility:  Mod I to get EOB and min assist STS  Gait 45 ft RW SBA  Pt ambulated to bathroom to void and independent with hygiene. Mod assist to stand from commode and gait to bedside chair. She asked for BR again and put on BSC. Call bell in hand.   Pt reports that her  can assist her @ home  therefore rec's changed to low intensity.     Education Provided:  Role and goals of PT, transfer training, bed mobility, gait training, balance training, safety awareness, assistive device, strengthening exercises, and importance of participating in PT to return to PLOF.     Patient left  on BSC  with call button in reach    GOALS:   Multidisciplinary Problems       Physical Therapy Goals          Problem: Physical Therapy    Goal Priority Disciplines Outcome Interventions   Physical Therapy Goal     PT, PT/OT Progressing    Description: Goals to be met by: 25     Patient will increase functional independence with mobility by performin. Supine to sit with [place]  2. Sit to supine with [place]  3. Sit to stand transfer with Modified [place]  4. Gait  x 200 feet with Modified [place] using Rolling Walker.                          Time Tracking:       Billable Minutes: Gait Training 15 and Therapeutic Activity 15    Treatment Type: Treatment  PT/PTA: PTA     Number of PTA visits since last PT visit: 2025

## 2025-03-26 NOTE — PROGRESS NOTES
[facility]  Neurosurgery  Progress Note    Subjective:     Interval History: NAEs overnight. Reports continuing improvement of leg motors and of post op soreness. Has been ambulating with a walker.     Post-Op Info:  Procedure(s) (LRB):  LAMINECTOMY, SPINE, THORACIC (N/A)  LAMINECTOMY, SPINE, THORACIC, POSTERIOR APPROACH, WITH EXCISION OF SPINAL CORD LESION   7 Days Post-Op      Medications:  Continuous Infusions:  Scheduled Meds:   amLODIPine  10 mg Oral Daily    apixaban  5 mg Oral BID    bisacodyL  10 mg Rectal Daily    gabapentin  300 mg Oral TID    metoprolol succinate  12.5 mg Oral Daily    paroxetine  20 mg Oral Daily    senna-docusate 8.6-50 mg  2 tablet Oral BID     PRN Meds:  Current Facility-Administered Medications:     acetaminophen, 650 mg, Oral, Q6H PRN    acetaminophen, 650 mg, Oral, Q4H PRN    aluminum-magnesium hydroxide-simethicone, 30 mL, Oral, Q4H PRN    bisacodyL, 10 mg, Rectal, Daily PRN    calcium carbonate, 500 mg, Oral, Daily PRN    dextrose 50%, 12.5 g, Intravenous, PRN    glucagon (human recombinant), 1 mg, Intramuscular, PRN    hydrALAZINE, 10 mg, Intravenous, Q4H PRN    HYDROcodone-acetaminophen, 1 tablet, Oral, Q4H PRN    HYDROcodone-acetaminophen, 1 tablet, Oral, Q4H PRN    insulin aspart U-100, 0-10 Units, Subcutaneous, Q6H PRN    melatonin, 6 mg, Oral, Nightly PRN    methocarbamoL, 750 mg, Oral, QID PRN    metoprolol, 5 mg, Intravenous, Q6H PRN    metoprolol, 5 mg, Intravenous, Q4H PRN    morphine, 1 mg, Intravenous, Q1H PRN    morphine, 2 mg, Intravenous, Q1H PRN    morphine, 4 mg, Intravenous, Q3H PRN    naloxone, 0.02 mg, Intravenous, PRN    ondansetron, 4 mg, Oral, Q4H PRN    oxyCODONE-acetaminophen, 1 tablet, Oral, Q4H PRN    prochlorperazine, 5 mg, Intravenous, Q6H PRN    senna-docusate 8.6-50 mg, 1 tablet, Oral, BID PRN    sodium chloride 0.9%, 10 mL, Intravenous, PRN     Review of Systems  Objective:     Weight: 117.9 kg (259 lb 14.8 oz)  Body  "mass index is 36.25 kg/m².  Vital Signs (Most Recent):  Temp: 98.2 °F (36.8 °C) (03/26/25 0428)  Pulse: (!) 54 (03/26/25 0428)  Resp: 18 (03/26/25 0428)  BP: (!) 155/79 (03/26/25 0428)  SpO2: (!) 92 % (03/26/25 0428) Vital Signs (24h Range):  Temp:  [97.7 °F (36.5 °C)-98.4 °F (36.9 °C)] 98.2 °F (36.8 °C)  Pulse:  [54-60] 54  Resp:  [17-19] 18  SpO2:  [92 %-96 %] 92 %  BP: (113-157)/(62-79) 155/79                              Neurosurgery Physical Exam  Awake, alert, oriented.   NAD. Resting in bed.   Moves upper extremities well.   4+-5/5 bilateral lower extremity motors. Sensation intact.   Ambulated.     Significant Labs:  Recent Labs   Lab 03/25/25 0441 03/26/25 0358    141   K 3.8 3.9    104   CO2 28 27   BUN 10.4 13.5   CREATININE 0.51* 0.58   CALCIUM 9.2 8.9   MG  --  1.60     Recent Labs   Lab 03/25/25 0441   WBC 8.85   HGB 14.5   HCT 42.1        No results for input(s): "LABPT", "INR", "APTT" in the last 48 hours.  Microbiology Results (last 7 days)       ** No results found for the last 168 hours. **              Assessment/Plan:     -PTOT  -Pain control  -Dressing changes as needed. Ok to leave open to air. Ok to shower  -Placement    SOPHIE Snyder  Neurosurgery  [facility]    "

## 2025-03-26 NOTE — PLAN OF CARE
Pt's transfer to [facility] denied. Per PT, they worked with her this AM and they are clearing her to go home. They made her aware that she will not have HH services 2/2 no payor. SW updated MD.     Vi Lacy LCSW

## 2025-05-12 PROBLEM — I48.20 CHRONIC ATRIAL FIBRILLATION: Status: ACTIVE | Noted: 2025-05-12

## 2025-05-12 PROBLEM — E11.9 CONTROLLED TYPE 2 DIABETES MELLITUS, WITHOUT LONG-TERM CURRENT USE OF INSULIN: Status: ACTIVE | Noted: 2025-05-12

## 2025-08-06 ENCOUNTER — LAB VISIT (OUTPATIENT)
Dept: LAB | Facility: HOSPITAL | Age: 65
End: 2025-08-06
Attending: NURSE PRACTITIONER
Payer: MEDICAID

## 2025-08-06 DIAGNOSIS — E11.8 CONTROLLED TYPE 2 DIABETES MELLITUS WITH COMPLICATION, WITHOUT LONG-TERM CURRENT USE OF INSULIN: ICD-10-CM

## 2025-08-06 LAB
ALBUMIN SERPL BCP-MCNC: 3.8 G/DL (ref 3.5–5.2)
ALBUMIN/CREAT UR: 138.2 UG/MG
ALP SERPL-CCNC: 83 UNIT/L (ref 40–150)
ALT SERPL W/O P-5'-P-CCNC: 13 UNIT/L (ref 10–44)
ANION GAP (OHS): 10 MMOL/L (ref 8–16)
AST SERPL-CCNC: 29 UNIT/L (ref 11–45)
BILIRUB SERPL-MCNC: 0.7 MG/DL (ref 0.1–1)
BUN SERPL-MCNC: 12 MG/DL (ref 8–23)
CALCIUM SERPL-MCNC: 9.6 MG/DL (ref 8.7–10.5)
CHLORIDE SERPL-SCNC: 106 MMOL/L (ref 95–110)
CHOLEST SERPL-MCNC: 180 MG/DL (ref 120–199)
CHOLEST/HDLC SERPL: 4 {RATIO} (ref 2–5)
CO2 SERPL-SCNC: 27 MMOL/L (ref 23–29)
CREAT SERPL-MCNC: 0.6 MG/DL (ref 0.5–1.4)
CREAT UR-MCNC: 104.2 MG/DL (ref 15–325)
EAG (OHS): 160 MG/DL (ref 68–131)
GFR SERPLBLD CREATININE-BSD FMLA CKD-EPI: >60 ML/MIN/1.73/M2
GLUCOSE SERPL-MCNC: 166 MG/DL (ref 70–110)
HBA1C MFR BLD: 7.2 % (ref 4–5.6)
HDLC SERPL-MCNC: 45 MG/DL (ref 40–75)
HDLC SERPL: 25 % (ref 20–50)
LDLC SERPL CALC-MCNC: 104 MG/DL (ref 63–159)
MICROALBUMIN UR-MCNC: 144 UG/ML (ref ?–5000)
NONHDLC SERPL-MCNC: 135 MG/DL
POTASSIUM SERPL-SCNC: 4.5 MMOL/L (ref 3.5–5.1)
PROT SERPL-MCNC: 7.2 GM/DL (ref 6–8.4)
SODIUM SERPL-SCNC: 143 MMOL/L (ref 136–145)
TRIGL SERPL-MCNC: 155 MG/DL (ref 30–150)

## 2025-08-06 PROCEDURE — 80061 LIPID PANEL: CPT

## 2025-08-06 PROCEDURE — 83036 HEMOGLOBIN GLYCOSYLATED A1C: CPT

## 2025-08-06 PROCEDURE — 82043 UR ALBUMIN QUANTITATIVE: CPT

## 2025-08-06 PROCEDURE — 82435 ASSAY OF BLOOD CHLORIDE: CPT

## 2025-08-06 PROCEDURE — 36415 COLL VENOUS BLD VENIPUNCTURE: CPT

## (undated) DEVICE — DRAPE SURG W/TWL 17 5/8X23

## (undated) DEVICE — TUBING SILICON CLR 3/16IN 10FT

## (undated) DEVICE — GOWN SURGICAL BRTHBL XL

## (undated) DEVICE — FORCEP SPTZLR-MALIS 1.5MM 8IN

## (undated) DEVICE — TRAP ETRAP POLYP 50 TRAY

## (undated) DEVICE — APPLICATOR CHLORAPREP ORN 26ML

## (undated) DEVICE — DRAPE OPMI STERILE

## (undated) DEVICE — DRAIN ROUND SUCTION 10FR

## (undated) DEVICE — ELECTRODE BLD EXT 6.50 ST DISP

## (undated) DEVICE — STRIP MEDI WND CLSR 1/2X4IN

## (undated) DEVICE — PATCH SEALANT TACHOSIL 4.8X4.8

## (undated) DEVICE — ADHESIVE MASTISOL VIAL 48/BX

## (undated) DEVICE — NDL ANES SPINAL 18X3.5ST 18G

## (undated) DEVICE — PENCIL SMOKE EVAC TELSCP 15FT

## (undated) DEVICE — LINER SUCTION CANNISTER REGUGA

## (undated) DEVICE — SUT 4/0 18IN NUROLON BLK B

## (undated) DEVICE — SEALANT ADHERUS DURAL AUTOSPRY

## (undated) DEVICE — KIT VIA CUSTOM PROCEDURE

## (undated) DEVICE — DRESSING TELFA N ADH 3X8

## (undated) DEVICE — SHEET AVIENE MICFIB 1.4X1.4IN

## (undated) DEVICE — SUT VICRYL PLUS 0 CT-1 18IN

## (undated) DEVICE — TUBE SUCTION FRAZIER VENT 10FR

## (undated) DEVICE — NDL HYPO 22GX1 1/2 SYR 10ML LL

## (undated) DEVICE — DRAPE C-ARMOR EQUIPMENT COVER

## (undated) DEVICE — TUBE TORRENT IRRIGATION

## (undated) DEVICE — SOL IRRI STRL WATER 1000ML

## (undated) DEVICE — CONNECTOR TORRENT SCP OLYMPUS

## (undated) DEVICE — ELECTRODE FOAM 535 TEARDROP

## (undated) DEVICE — BUR BONE CUT MICRO TPS 3X3.8MM

## (undated) DEVICE — TUBING OXYGEN CONNECT BUBBLE

## (undated) DEVICE — SYS AQUASHIELD WATTER BOTTLE

## (undated) DEVICE — GLOVE PROTEXIS BLUE LATEX 8

## (undated) DEVICE — GLOVE PROTEXIS LTX MICRO  7.5

## (undated) DEVICE — UNDERPAD DISPOSABLE 30X30IN

## (undated) DEVICE — SUT VICRYL 4-0 18 P-3

## (undated) DEVICE — SOL NACL IRR 1000ML BTL

## (undated) DEVICE — SUT PROLENE 6-0 C-1 30IN BL

## (undated) DEVICE — ELECTRODE BLADE E-Z CLEAN 4IN

## (undated) DEVICE — PAD DERMAPROX XL 22X14X.5IN

## (undated) DEVICE — SUT VICRYL PLUS 3-0 X-1 18IN

## (undated) DEVICE — SYR SLIP TIP 60 CC DISP

## (undated) DEVICE — KIT SURGIFLO HEMOSTATIC MATRIX

## (undated) DEVICE — COVER C-ARM STRAP BAND 44X80IN

## (undated) DEVICE — TUBING SUC UNIV W/CONN 12FT

## (undated) DEVICE — DRAPE TOP 53X102IN

## (undated) DEVICE — TIP YANKAUERS BULB NO VENT

## (undated) DEVICE — KIT SURGICAL TURNOVER

## (undated) DEVICE — ELECTRODE REM PLYHSV RETURN 9

## (undated) DEVICE — PILLOW HEAD REST

## (undated) DEVICE — SNARE SNAREMASTER OVAL 25MM

## (undated) DEVICE — KIT POS JACKSON TABLE NO HDRST

## (undated) DEVICE — RESERVOIR JACKSON-PRATT 100CC

## (undated) DEVICE — Device

## (undated) DEVICE — DRAPE ORTH SPLIT 77X108IN

## (undated) DEVICE — KIT BIOGUARD AIR WTR SUC VALVE

## (undated) DEVICE — SUT PROLENE 6-0 BV-1 30IN

## (undated) DEVICE — TUBE SUC UNIVERSAL .25XIN 6FT

## (undated) DEVICE — SPONGE DRY VIA GREEN

## (undated) DEVICE — SEE MEDLINE ITEM 152546

## (undated) DEVICE — CONNECTOR TUBING 2X4